# Patient Record
Sex: MALE | Race: BLACK OR AFRICAN AMERICAN | NOT HISPANIC OR LATINO | URBAN - METROPOLITAN AREA
[De-identification: names, ages, dates, MRNs, and addresses within clinical notes are randomized per-mention and may not be internally consistent; named-entity substitution may affect disease eponyms.]

---

## 2017-04-25 ENCOUNTER — EMERGENCY (EMERGENCY)
Facility: HOSPITAL | Age: 27
LOS: 0 days | Discharge: HOME | End: 2017-04-25

## 2017-06-28 DIAGNOSIS — R56.9 UNSPECIFIED CONVULSIONS: ICD-10-CM
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
## 2017-06-28 DIAGNOSIS — M25.511 PAIN IN RIGHT SHOULDER: ICD-10-CM

## 2017-06-28 DIAGNOSIS — S01.552A OPEN BITE OF ORAL CAVITY, INITIAL ENCOUNTER: ICD-10-CM

## 2017-06-28 DIAGNOSIS — Z79.899 OTHER LONG TERM (CURRENT) DRUG THERAPY: ICD-10-CM

## 2017-06-28 DIAGNOSIS — G40.909 EPILEPSY, UNSPECIFIED, NOT INTRACTABLE, WITHOUT STATUS EPILEPTICUS: ICD-10-CM
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
## 2017-08-09 ENCOUNTER — EMERGENCY (EMERGENCY)
Facility: HOSPITAL | Age: 27
LOS: 0 days | Discharge: HOME | End: 2017-08-09

## 2017-08-09 DIAGNOSIS — R56.9 UNSPECIFIED CONVULSIONS: ICD-10-CM

## 2017-08-09 DIAGNOSIS — G40.909 EPILEPSY, UNSPECIFIED, NOT INTRACTABLE, WITHOUT STATUS EPILEPTICUS: ICD-10-CM

## 2017-08-09 DIAGNOSIS — Z79.899 OTHER LONG TERM (CURRENT) DRUG THERAPY: ICD-10-CM
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
## 2017-08-16 ENCOUNTER — INPATIENT (INPATIENT)
Facility: HOSPITAL | Age: 27
LOS: 0 days | Discharge: LEFT BEFORE DISCHARGE | End: 2017-08-16
Attending: SURGERY
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
## 2017-08-16 DIAGNOSIS — R56.9 UNSPECIFIED CONVULSIONS: ICD-10-CM

## 2017-08-18 DIAGNOSIS — F12.10 CANNABIS ABUSE, UNCOMPLICATED: ICD-10-CM

## 2017-08-18 DIAGNOSIS — G83.84 TODD'S PARALYSIS (POSTEPILEPTIC): ICD-10-CM
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
## 2017-08-18 DIAGNOSIS — G40.419 OTHER GENERALIZED EPILEPSY AND EPILEPTIC SYNDROMES, INTRACTABLE, WITHOUT STATUS EPILEPTICUS: ICD-10-CM

## 2017-08-18 DIAGNOSIS — R56.9 UNSPECIFIED CONVULSIONS: ICD-10-CM

## 2017-08-18 DIAGNOSIS — Z82.49 FAMILY HISTORY OF ISCHEMIC HEART DISEASE AND OTHER DISEASES OF THE CIRCULATORY SYSTEM: ICD-10-CM

## 2017-08-18 DIAGNOSIS — Z83.3 FAMILY HISTORY OF DIABETES MELLITUS: ICD-10-CM
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
## 2017-08-18 DIAGNOSIS — S12.400A UNSPECIFIED DISPLACED FRACTURE OF FIFTH CERVICAL VERTEBRA, INITIAL ENCOUNTER FOR CLOSED FRACTURE: ICD-10-CM
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
## 2017-09-15 PROBLEM — Z00.00 ENCOUNTER FOR PREVENTIVE HEALTH EXAMINATION: Status: ACTIVE | Noted: 2017-09-15

## 2018-05-12 ENCOUNTER — INPATIENT (INPATIENT)
Facility: HOSPITAL | Age: 28
LOS: 0 days | Discharge: LEFT BEFORE DISCHARGE | End: 2018-05-13
Attending: INTERNAL MEDICINE | Admitting: INTERNAL MEDICINE

## 2018-05-12 ENCOUNTER — EMERGENCY (EMERGENCY)
Facility: HOSPITAL | Age: 28
LOS: 0 days | Discharge: LEFT BEFORE DISCHARGE | End: 2018-05-12
Attending: EMERGENCY MEDICINE

## 2018-05-12 VITALS
HEART RATE: 72 BPM | HEIGHT: 70 IN | SYSTOLIC BLOOD PRESSURE: 132 MMHG | WEIGHT: 188.05 LBS | TEMPERATURE: 98 F | RESPIRATION RATE: 17 BRPM | DIASTOLIC BLOOD PRESSURE: 81 MMHG | OXYGEN SATURATION: 98 %

## 2018-05-12 VITALS
SYSTOLIC BLOOD PRESSURE: 115 MMHG | TEMPERATURE: 97 F | RESPIRATION RATE: 18 BRPM | WEIGHT: 199.96 LBS | DIASTOLIC BLOOD PRESSURE: 56 MMHG | HEART RATE: 81 BPM | OXYGEN SATURATION: 99 % | HEIGHT: 69 IN

## 2018-05-12 VITALS
RESPIRATION RATE: 18 BRPM | SYSTOLIC BLOOD PRESSURE: 112 MMHG | HEART RATE: 79 BPM | DIASTOLIC BLOOD PRESSURE: 60 MMHG | OXYGEN SATURATION: 99 %

## 2018-05-12 DIAGNOSIS — R56.9 UNSPECIFIED CONVULSIONS: ICD-10-CM

## 2018-05-12 LAB
ALBUMIN SERPL ELPH-MCNC: 3.8 G/DL — SIGNIFICANT CHANGE UP (ref 3.5–5.2)
ALP SERPL-CCNC: 79 U/L — SIGNIFICANT CHANGE UP (ref 30–115)
ALT FLD-CCNC: 15 U/L — SIGNIFICANT CHANGE UP (ref 0–41)
ANION GAP SERPL CALC-SCNC: 11 MMOL/L — SIGNIFICANT CHANGE UP (ref 7–14)
AST SERPL-CCNC: 9 U/L — SIGNIFICANT CHANGE UP (ref 0–41)
BILIRUB SERPL-MCNC: 0.4 MG/DL — SIGNIFICANT CHANGE UP (ref 0.2–1.2)
BUN SERPL-MCNC: 13 MG/DL — SIGNIFICANT CHANGE UP (ref 10–20)
CALCIUM SERPL-MCNC: 8.6 MG/DL — SIGNIFICANT CHANGE UP (ref 8.5–10.1)
CHLORIDE SERPL-SCNC: 99 MMOL/L — SIGNIFICANT CHANGE UP (ref 98–110)
CO2 SERPL-SCNC: 25 MMOL/L — SIGNIFICANT CHANGE UP (ref 17–32)
CREAT SERPL-MCNC: 0.8 MG/DL — SIGNIFICANT CHANGE UP (ref 0.7–1.5)
GAS PNL BLDV: SIGNIFICANT CHANGE UP
GLUCOSE SERPL-MCNC: 90 MG/DL — SIGNIFICANT CHANGE UP (ref 70–99)
HCT VFR BLD CALC: 37.4 % — LOW (ref 42–52)
HGB BLD-MCNC: 12.6 G/DL — LOW (ref 14–18)
MCHC RBC-ENTMCNC: 29.1 PG — SIGNIFICANT CHANGE UP (ref 27–31)
MCHC RBC-ENTMCNC: 33.7 G/DL — SIGNIFICANT CHANGE UP (ref 32–37)
MCV RBC AUTO: 86.4 FL — SIGNIFICANT CHANGE UP (ref 80–94)
NRBC # BLD: 0 /100 WBCS — SIGNIFICANT CHANGE UP (ref 0–0)
PLATELET # BLD AUTO: 182 K/UL — SIGNIFICANT CHANGE UP (ref 130–400)
POTASSIUM SERPL-MCNC: 4.2 MMOL/L — SIGNIFICANT CHANGE UP (ref 3.5–5)
POTASSIUM SERPL-SCNC: 4.2 MMOL/L — SIGNIFICANT CHANGE UP (ref 3.5–5)
PROT SERPL-MCNC: 6.2 G/DL — SIGNIFICANT CHANGE UP (ref 6–8)
RBC # BLD: 4.33 M/UL — LOW (ref 4.7–6.1)
RBC # FLD: 14.2 % — SIGNIFICANT CHANGE UP (ref 11.5–14.5)
SODIUM SERPL-SCNC: 135 MMOL/L — SIGNIFICANT CHANGE UP (ref 135–146)
WBC # BLD: 9.63 K/UL — SIGNIFICANT CHANGE UP (ref 4.8–10.8)
WBC # FLD AUTO: 9.63 K/UL — SIGNIFICANT CHANGE UP (ref 4.8–10.8)

## 2018-05-12 RX ORDER — LEVETIRACETAM 250 MG/1
500 TABLET, FILM COATED ORAL
Qty: 0 | Refills: 0 | Status: DISCONTINUED | OUTPATIENT
Start: 2018-05-12 | End: 2018-05-12

## 2018-05-12 RX ORDER — HEPARIN SODIUM 5000 [USP'U]/ML
5000 INJECTION INTRAVENOUS; SUBCUTANEOUS EVERY 12 HOURS
Qty: 0 | Refills: 0 | Status: DISCONTINUED | OUTPATIENT
Start: 2018-05-12 | End: 2018-05-12

## 2018-05-12 RX ORDER — HEPARIN SODIUM 5000 [USP'U]/ML
5000 INJECTION INTRAVENOUS; SUBCUTANEOUS EVERY 12 HOURS
Qty: 0 | Refills: 0 | Status: DISCONTINUED | OUTPATIENT
Start: 2018-05-12 | End: 2018-05-13

## 2018-05-12 RX ORDER — LEVETIRACETAM 250 MG/1
2000 TABLET, FILM COATED ORAL ONCE
Qty: 0 | Refills: 0 | Status: COMPLETED | OUTPATIENT
Start: 2018-05-12 | End: 2018-05-12

## 2018-05-12 RX ORDER — LEVETIRACETAM 250 MG/1
500 TABLET, FILM COATED ORAL ONCE
Qty: 0 | Refills: 0 | Status: COMPLETED | OUTPATIENT
Start: 2018-05-12 | End: 2018-05-12

## 2018-05-12 RX ORDER — PANTOPRAZOLE SODIUM 20 MG/1
40 TABLET, DELAYED RELEASE ORAL
Qty: 0 | Refills: 0 | Status: DISCONTINUED | OUTPATIENT
Start: 2018-05-12 | End: 2018-05-13

## 2018-05-12 RX ORDER — LEVETIRACETAM 250 MG/1
1500 TABLET, FILM COATED ORAL
Qty: 0 | Refills: 0 | Status: DISCONTINUED | OUTPATIENT
Start: 2018-05-13 | End: 2018-05-13

## 2018-05-12 RX ADMIN — Medication 2 MILLIGRAM(S): at 05:22

## 2018-05-12 RX ADMIN — LEVETIRACETAM 400 MILLIGRAM(S): 250 TABLET, FILM COATED ORAL at 06:04

## 2018-05-12 RX ADMIN — LEVETIRACETAM 420 MILLIGRAM(S): 250 TABLET, FILM COATED ORAL at 22:11

## 2018-05-12 RX ADMIN — Medication 100 MILLIGRAM(S): at 06:57

## 2018-05-12 RX ADMIN — LEVETIRACETAM 500 MILLIGRAM(S): 250 TABLET, FILM COATED ORAL at 17:41

## 2018-05-12 NOTE — H&P ADULT - NSHPLABSRESULTS_GEN_ALL_CORE
12.6   9.63  )-----------( 182      ( 12 May 2018 04:29 )             37.4     05-12    135  |  99  |  13  ----------------------------<  90  4.2   |  25  |  0.8    Ca    8.6      12 May 2018 04:29    TPro  6.2  /  Alb  3.8  /  TBili  0.4  /  DBili  x   /  AST  9   /  ALT  15  /  AlkPhos  79  05-12              Lactate Trend        CAPILLARY BLOOD GLUCOSE

## 2018-05-12 NOTE — CONSULT NOTE ADULT - SUBJECTIVE AND OBJECTIVE BOX
Neurology Consult    REYES CHARLINE 27y Male    Patient is a 27y old  Male who presents with a chief complaint of pt had   un witnessed sseizures at home as p[er er.  pt c/o only general weakness (12 May 2018 06:36)  He reports starting to have seizures from 2014. the frequency of the events are about 1/month. He says occasionally has a warning in the form of an intese feeling like anxiety before the seizure. No Tod's after the seizure.  He reports being compliant. Not aware of blood levels done ouside.  He denies any risk factors for seizure denies taking recreational drugs      HPI:  no h/o  fever, head trauma prior to aarrival (12 May 2018 06:36)      PAST MEDICAL & SURGICAL HISTORY:  Seizures  No significant past surgical history      FAMILY HISTORY:  No pertinent family history in first degree relatives      Social History: (-) x 3    Allergies    No Known Allergies    Intolerances        Home Medications:  Keppra 500 mg oral tablet:  orally once a day (12 May 2018 01:06)      MEDICATIONS  (STANDING):  heparin  Injectable 5000 Unit(s) SubCutaneous every 12 hours  levETIRAcetam 500 milliGRAM(s) Oral two times a day  pantoprazole    Tablet 40 milliGRAM(s) Oral before breakfast    MEDICATIONS  (PRN):  LORazepam   Injectable 2 milliGRAM(s) IV Push every 4 hours PRN seizures      Review of systems:    Constitutional: No fever, weight loss or fatigue    Eyes: No eye pain or discharge  ENMT:  No difficulty hearing; No sinus or throat pain  Neck: No pain or stiffness  Respiratory: No cough, wheezing, chills or hemoptysis  Cardiovascular: No chest pain, palpitations, shortness of breath, dyspnea on exertion  Gastrointestinal: No abdominal pain, nausea, vomiting or hematemesis; No diarrhea or constipation.   Genitourinary: No dysuria, frequency, hematuria or incontinence  Neurological: As per HPI  Skin: No rashes or lesions   Endocrine: No heat or cold intolerance; No hair loss  Musculoskeletal: No joint pain or swelling  Psychiatric: No depression, anxiety, mood swings  Heme/Lymph: No easy bruising or bleeding gums    Vital Signs Last 24 Hrs  T(C): 37.1 (12 May 2018 07:48), Max: 37.1 (12 May 2018 07:48)  T(F): 98.7 (12 May 2018 07:48), Max: 98.7 (12 May 2018 07:48)  HR: 114 (12 May 2018 04:45) (72 - 114)  BP: 115/58 (12 May 2018 07:48) (107/51 - 132/81)  BP(mean): --  RR: 88 (12 May 2018 07:48) (17 - 88)  SpO2: 98% (12 May 2018 03:24) (98% - 99%)    Neurologic Examination:  General:  Appearance is consistent with chronologic age.  No abnormal facies.   General: The patient is oriented to person, place, time and date.  Recent and remote memory intact.  Fund of knowledge is intact and normal.  Language with normal repetition, comprehension and naming.  Nondysarthric.    Cranial nerves: intact VA, VFF.  EOMI w/o nystagmus, skew or reported double vision.  PERRL.  No ptosis/weakness of eyelid closure.  Facial sensation is normal with normal bite.  No facial asymmetry.  Hearing grossly intact b/l.  Palate elevates midline.  Tongue midline.  Motor examination:   Normal tone, bulk and range of motion.  No tenderness, twitching, tremors or involuntary movements.  Formal Muscle Strength Testing: (MRC grade R/L) 5/5 UE; 5/5 LE.  No observable drift.  Reflexes:   2+ b/l pectoralis, biceps, triceps, brachioradialis, patella and Achilles.  Plantar response downgoing b/l.  Jaw jerk, Basim, clonus absent.  Sensory examination:   Intact to light touch and pinprick, pain, temperature and proprioception and vibration in all extremities.  Cerebellum:   FTN/HKS intact with normal ELDON in all limbs.  No dysmetria or dysdiadokinesia.  Gait narrow based and normal.    Labs:                         12.6   9.63  )-----------( 182      ( 12 May 2018 04:29 )             37.4     05-12    135  |  99  |  13  ----------------------------<  90  4.2   |  25  |  0.8    Ca    8.6      12 May 2018 04:29    TPro  6.2  /  Alb  3.8  /  TBili  0.4  /  DBili  x   /  AST  9   /  ALT  15  /  AlkPhos  79  05-12    LIVER FUNCTIONS - ( 12 May 2018 04:29 )  Alb: 3.8 g/dL / Pro: 6.2 g/dL / ALK PHOS: 79 U/L / ALT: 15 U/L / AST: 9 U/L / GGT: x                   Neuroimaging:  NCHCT: CT Head No Cont:   EXAM:  CT BRAIN            PROCEDURE DATE:  05/12/2018            INTERPRETATION:  Clinical History / Reason for exam: Status epilepticus.    Technique:  Multiple contiguous axial CT images of the brain were   obtained without the administration ofintravenous contrast.  Coronal and   sagittal reformatted images were also obtained.     Comparison:   CT head 8/16/2017     Findings:  The cortical sulci and ventricular system are normal in size and   appearance.      Evaluation of the brain parenchyma demonstrates normal gray-white   differentiation throughout.  There is no evidence for mass effect,   midline shift or intracranial hemorrhage.      There is no evidence of depressed skull fracture.      Mucosal thickening is present in the left maxillary sinus. The mastoid   air cells are well-aerated.       Impression:        No acute intracranial hemorrhage, mass effect or midline shift.                    DARA PINEDA M.D., RESIDENT RADIOLOGIST  This document has been electronically signed.  TIARA RIZZO M.D., ATTENDING RADIOLOGIST  This document has been electronically signed. May 12 2018  6:22AM             (05-12-18 @ 05:55)    CT Angiography/Perfusion:  MRI Brain NC:  MRA Head/Neck:  EEG:    Assessment:  This is a 27y Male with h/o     Plan:   -   05-12-18 @ 11:19

## 2018-05-12 NOTE — ED PROVIDER NOTE - NS ED ROS FT
Constitutional: No fevers/chills.    Head: No injury, headache.    Eyes:  No visual changes, eye pain or discharge. No injury.    ENMT:  No hearing changes, pain, discharge or infections. No neck pain or stiffness. No loss ROM.    Cardiac:  No chest pain, SOB or edema. No chest pain with exertion.    Respiratory:  No cough or respiratory distress.     GI:  No nausea, vomiting, diarrhea or abdominal pain. No anorexia. No change in PO intake.    :  No dysuria, frequency, urgency or burning. No change in urine output.    MS:  No myalgia, muscle weakness, joint pain or back pain. No loss ROM.    Neuro:  (+) fatigue. No dizziness or weakness.  No LOC.    Skin:  No skin rash.     Endocrine: No history of thyroid disease or diabetes.

## 2018-05-12 NOTE — H&P ADULT - NSHPREVIEWOFSYSTEMS_GEN_ALL_CORE
REVIEW OF SYSTEMS:      CONSTITUTIONAL:      h/o seizures ---  on keppra  EYES/ENT:       NECK:       RESPIRATORY:       CARDIOVASCULAR:       GASTROINTESTINAL:       GENITOURINARY:        NEUROLOGICAL:        h/o seizures ++  SKIN:

## 2018-05-12 NOTE — ED PROVIDER NOTE - PROGRESS NOTE DETAILS
Patient had grand mal seizure in ED. Ativan 2mg given will load with keppra. spoke with Dr. Smith Patient approved for epilepsy unit dr. ni aware of admission

## 2018-05-12 NOTE — ED ADULT TRIAGE NOTE - CHIEF COMPLAINT QUOTE
Pt BIBA. As per EMT "Pt's mother will not allow him back into the house until he has blood work done for his seizures"   Pt has no complaint at this time.

## 2018-05-12 NOTE — ED PROVIDER NOTE - ATTENDING CONTRIBUTION TO CARE
26 y/o M PMH Epilepsy on Keppra who presents s/p 2 seizures today.  Patient was BIBEMS for a seizure and eloped form the ED.  EMS later brought him back again for a seizure. Patient denies any falls or recent trauma. No fever or recent illness.  Patient had another seizure here in the ED and meets criteria for status epilepticus.  Patient given Ativan.  Will load with Keppra.  Will get appropriate work up done and consult Neuro for admission for Status Epilepticus.

## 2018-05-12 NOTE — H&P ADULT - REASON FOR ADMISSION
pt had witnessed seizures pt had   un witnessed sseizures at home as p[er er.  pt c/o only general weakness

## 2018-05-12 NOTE — ED PROVIDER NOTE - OBJECTIVE STATEMENT
26 yo male with PMHx seizures on Keppra presents for seizure at home. Patient states he had an unwitnessed seizure at home and he knows he had a seizure because he has a headache after seizures. Patient was in ED earlier but eloped before he got bloodwork. Patient went home and mother told  him he cant come back in the house until he goes to hospital for further work up. Patient here for evaluation. Patient only complaint is fatigue. Patient/family denies fevers, chest pain, SOB, abdominal pain, nausea, vomiting, diarrhea, constipation, dizziness, recent travel, changes in PO intake, changes in urine output, changes in mental status.

## 2018-05-12 NOTE — CHART NOTE - NSCHARTNOTEFT_GEN_A_CORE
nocturnist event note    Called for patient with seizure like spell., described as shaking of both upper ext. patient stated h had an aura before hand. entire episdoe lasted about 1 minute and there was no postictal state.       Came to the floor right away, patient is awake and alert and has a large dinne rtray in front of him that he is eating. tells me he has been having seizures since 2014     VSS, awake and alert, eating  AAOx3, S1 S2 mildy tachy 10-4 regular rythm. CTA BL lungs  abd soft  cn 2-12 intact  motor 5/5 all ext  sensory symmetric      Chart review reveals a 26 yo male with seizure d/o, brought in for eval of such, and s.p grand mal seizure in the ER last night. He was loaded with 2g keppra in the ER and is currently maintained on keppra 500mg po q12. OF note is he tells me he takes 1500mg po 12, though seemed unsure about his dose    impression:  possible seizure based on reprot  no postictal state  self resolved, no need for ativan  call placed out to neuro to discuss keppra dosing  will f/u neuro recs and endorse to dayteam for followup as well

## 2018-05-12 NOTE — ED PROVIDER NOTE - PHYSICAL EXAMINATION
GEN: Well appearing, in no apparent distress.    HEAD:  Normocephalic, atraumatic.    EYES:  Clear conjunctivae without injection, drainage or discharge.    ENMT:  Nasal mucosa moist; mouth moist without ulcerations or lesions; throat moist without erythema, exudate, ulcerations or lesions.    NECK:  Supple, no masses. Normal ROM.    CARDIAC:  RRR, normal S1 and S2, no murmurs, rubs or gallops.    RESP:  Respiratory rate and effort appear normal; lungs are clear to auscultation bilaterally; no rhonchi, rales or wheezes.    ABDOMEN:  Soft, non-tender, non-distended, no masses. Normal BS throughout.    MUSCULOSKELETAL/NEURO:  AAO x 3. Motor 5/5. Sensory intact. CN II – XII intact. Patient able to move all 4 extremities without difficulty.    SKIN:  Normal skin color for age and race, well-perfused; warm and dry.

## 2018-05-12 NOTE — H&P ADULT - ASSESSMENT
Patient is a 27y old  Male who presents with a chief complaint of pt had witnessed seizures (12 May 2018 06:36)                                                                                                                                                                                                                                                                                       HEALTH ISSUES - PROBLEM Dx:pt with h\o seizures prese  nts with c/0 unitnessed seizures

## 2018-05-12 NOTE — CONSULT NOTE ADULT - ATTENDING COMMENTS
28 Y/O right handed   is here for having two seizures ,He carries the Dx of epilepsy perhaps partial/the description.  needs better characterization of the syndrome  discussed the V-EEG   He is not agreeable to that. However will think about it.    If decided not to stay suggest to give extra 250 of Keppra  before DC  F/U with his neurolgist

## 2018-05-13 VITALS
TEMPERATURE: 97 F | HEART RATE: 86 BPM | RESPIRATION RATE: 16 BRPM | SYSTOLIC BLOOD PRESSURE: 132 MMHG | DIASTOLIC BLOOD PRESSURE: 63 MMHG

## 2018-05-13 DIAGNOSIS — G47.30 SLEEP APNEA, UNSPECIFIED: ICD-10-CM

## 2018-05-13 LAB
ALBUMIN SERPL ELPH-MCNC: 3.8 G/DL — SIGNIFICANT CHANGE UP (ref 3.5–5.2)
ALP SERPL-CCNC: 86 U/L — SIGNIFICANT CHANGE UP (ref 30–115)
ALT FLD-CCNC: 21 U/L — SIGNIFICANT CHANGE UP (ref 0–41)
ANION GAP SERPL CALC-SCNC: 12 MMOL/L — SIGNIFICANT CHANGE UP (ref 7–14)
AST SERPL-CCNC: 25 U/L — SIGNIFICANT CHANGE UP (ref 0–41)
BASOPHILS # BLD AUTO: 0.05 K/UL — SIGNIFICANT CHANGE UP (ref 0–0.2)
BASOPHILS NFR BLD AUTO: 0.6 % — SIGNIFICANT CHANGE UP (ref 0–1)
BILIRUB DIRECT SERPL-MCNC: <0.2 MG/DL — SIGNIFICANT CHANGE UP (ref 0–0.2)
BILIRUB INDIRECT FLD-MCNC: >0.7 MG/DL — SIGNIFICANT CHANGE UP (ref 0.2–1.2)
BILIRUB SERPL-MCNC: 0.9 MG/DL — SIGNIFICANT CHANGE UP (ref 0.2–1.2)
BUN SERPL-MCNC: 12 MG/DL — SIGNIFICANT CHANGE UP (ref 10–20)
CALCIUM SERPL-MCNC: 8.2 MG/DL — LOW (ref 8.5–10.1)
CHLORIDE SERPL-SCNC: 103 MMOL/L — SIGNIFICANT CHANGE UP (ref 98–110)
CO2 SERPL-SCNC: 26 MMOL/L — SIGNIFICANT CHANGE UP (ref 17–32)
CREAT SERPL-MCNC: 0.9 MG/DL — SIGNIFICANT CHANGE UP (ref 0.7–1.5)
EOSINOPHIL # BLD AUTO: 0.14 K/UL — SIGNIFICANT CHANGE UP (ref 0–0.7)
EOSINOPHIL NFR BLD AUTO: 1.8 % — SIGNIFICANT CHANGE UP (ref 0–8)
GLUCOSE SERPL-MCNC: 91 MG/DL — SIGNIFICANT CHANGE UP (ref 70–99)
HCT VFR BLD CALC: 39.2 % — LOW (ref 42–52)
HGB BLD-MCNC: 12.9 G/DL — LOW (ref 14–18)
IMM GRANULOCYTES NFR BLD AUTO: 0.1 % — SIGNIFICANT CHANGE UP (ref 0.1–0.3)
LYMPHOCYTES # BLD AUTO: 1.9 K/UL — SIGNIFICANT CHANGE UP (ref 1.2–3.4)
LYMPHOCYTES # BLD AUTO: 24 % — SIGNIFICANT CHANGE UP (ref 20.5–51.1)
MAGNESIUM SERPL-MCNC: 2.2 MG/DL — SIGNIFICANT CHANGE UP (ref 1.8–2.4)
MCHC RBC-ENTMCNC: 28.5 PG — SIGNIFICANT CHANGE UP (ref 27–31)
MCHC RBC-ENTMCNC: 32.9 G/DL — SIGNIFICANT CHANGE UP (ref 32–37)
MCV RBC AUTO: 86.7 FL — SIGNIFICANT CHANGE UP (ref 80–94)
MONOCYTES # BLD AUTO: 1.18 K/UL — HIGH (ref 0.1–0.6)
MONOCYTES NFR BLD AUTO: 14.9 % — HIGH (ref 1.7–9.3)
NEUTROPHILS # BLD AUTO: 4.63 K/UL — SIGNIFICANT CHANGE UP (ref 1.4–6.5)
NEUTROPHILS NFR BLD AUTO: 58.6 % — SIGNIFICANT CHANGE UP (ref 42.2–75.2)
NRBC # BLD: 0 /100 WBCS — SIGNIFICANT CHANGE UP (ref 0–0)
PHOSPHATE SERPL-MCNC: 3.3 MG/DL — SIGNIFICANT CHANGE UP (ref 2.1–4.9)
PLATELET # BLD AUTO: 180 K/UL — SIGNIFICANT CHANGE UP (ref 130–400)
POTASSIUM SERPL-MCNC: 4.3 MMOL/L — SIGNIFICANT CHANGE UP (ref 3.5–5)
POTASSIUM SERPL-SCNC: 4.3 MMOL/L — SIGNIFICANT CHANGE UP (ref 3.5–5)
PROT SERPL-MCNC: 6 G/DL — SIGNIFICANT CHANGE UP (ref 6–8)
RBC # BLD: 4.52 M/UL — LOW (ref 4.7–6.1)
RBC # FLD: 13.9 % — SIGNIFICANT CHANGE UP (ref 11.5–14.5)
SODIUM SERPL-SCNC: 141 MMOL/L — SIGNIFICANT CHANGE UP (ref 135–146)
WBC # BLD: 7.91 K/UL — SIGNIFICANT CHANGE UP (ref 4.8–10.8)
WBC # FLD AUTO: 7.91 K/UL — SIGNIFICANT CHANGE UP (ref 4.8–10.8)

## 2018-05-13 RX ADMIN — LEVETIRACETAM 1500 MILLIGRAM(S): 250 TABLET, FILM COATED ORAL at 05:58

## 2018-05-13 RX ADMIN — PANTOPRAZOLE SODIUM 40 MILLIGRAM(S): 20 TABLET, DELAYED RELEASE ORAL at 05:59

## 2018-05-13 NOTE — PROGRESS NOTE ADULT - SUBJECTIVE AND OBJECTIVE BOX
CHARLINE CHAMBERS  27y  Male    Patient is a 27y old  Male who presents with a chief complaint of unwitnessed seizures at home and generalised weakness (12 May 2018 06:36)      INTERVAL HPI/OVERNIGHT EVENTS:  Several minutes of generalized tonic clonic seizures which went unrecognized by PCAs. Thus no IV ativan given. Keppra dose was however increased overnight. Given several seizures episodes    REVIEW OF SYSTEMS:  CONSTITUTIONAL: No fever, weight loss, or fatigue  EYES: No eye pain, visual disturbances, or discharge  ENMT:  No difficulty hearing, tinnitus, vertigo; No sinus or throat pain  NECK: No pain or stiffness  BREASTS: No pain, masses, or nipple discharge  RESPIRATORY: No cough, wheezing, chills or hemoptysis; No shortness of breath  CARDIOVASCULAR: No chest pain, palpitations, dizziness, or leg swelling  GASTROINTESTINAL: No abdominal or epigastric pain. No nausea, vomiting, or hematemesis; No diarrhea or constipation. No melena or hematochezia.  GENITOURINARY: No dysuria, frequency, hematuria, or incontinence  NEUROLOGICAL: No headaches, memory loss, loss of strength, numbness, or tremors  SKIN: No itching, burning, rashes, or lesions   LYMPH NODES: No enlarged glands  ENDOCRINE: No heat or cold intolerance; No hair loss  MUSCULOSKELETAL: No joint pain or swelling; No muscle, back, or extremity pain  PSYCHIATRIC: No depression, anxiety, mood swings, or difficulty sleeping  HEME/LYMPH: No easy bruising, or bleeding gums  ALLERY AND IMMUNOLOGIC: No hives or eczema    VITALS:  T(F): 97.1 (05-13-18 @ 05:30), Max: 99.2 (05-12-18 @ 14:14)  HR: 78 (05-13-18 @ 05:30) (73 - 109)  BP: 108/54 (05-13-18 @ 05:30) (107/57 - 138/72)  RR: 16 (05-13-18 @ 05:30) (16 - 16)  SpO2: 95% (05-12-18 @ 21:12) (95% - 95%)    PHYSICAL EXAM:  GENERAL: NAD, well-groomed, well-developed  HEAD:  Atraumatic, Normocephalic  EYES: EOMI, PERRLA, conjunctiva and sclera clear  ENMT: No tonsillar erythema, exudates, or enlargement; Moist mucous membranes, Good dentition, No lesions  NECK: Supple, No JVD, Normal thyroid  NERVOUS SYSTEM:  Alert & Oriented X3, Good concentration; Motor Strength 5/5 B/L upper and lower extremities; DTRs 2+ intact and symmetric  CHEST/LUNG: Clear to percussion bilaterally; No rales, rhonchi, wheezing, or rubs  HEART: Regular rate and rhythm; No murmurs, rubs, or gallops  ABDOMEN: Soft, Nontender, Nondistended; Bowel sounds present  EXTREMITIES:  2+ Peripheral Pulses, No clubbing, cyanosis, or edema  LYMPH: No lymphadenopathy noted  SKIN: No rashes or lesions    Consultant(s) Notes Reviewed:  [x ] YES  [ ] NO  Care Discussed with Consultants/Other Providers [ x] YES  [ ] NO    LABS:  MICROBIOLOGY:      RADIOLOGY & ADDITIONAL TESTS:    Imaging Personally Reviewed:  [ ] YES  [ ] NO  MEDICATION:  heparin  Injectable 5000 Unit(s) SubCutaneous every 12 hours  levETIRAcetam 1500 milliGRAM(s) Oral two times a day  LORazepam   Injectable 2 milliGRAM(s) IV Push every 4 hours PRN  pantoprazole    Tablet 40 milliGRAM(s) Oral before breakfast      HEALTH ISSUES:  HEALTH ISSUES - PROBLEM Dx:  Seizures: Seizures CHARLINE CHAMBERS  27y  Male    Patient is a 27y old  Male who presents with a chief complaint of unwitnessed seizures at home and generalised weakness (12 May 2018 06:36)      INTERVAL HPI/OVERNIGHT EVENTS:  Several minutes of generalized tonic clonic seizures which went unrecognized by PCAs. Thus no IV ativan given. Keppra dose was however increased overnight. Given several seizures episodes.    REVIEW OF SYSTEMS:  CONSTITUTIONAL: No fever, weight loss, or fatigue  EYES: No eye pain, visual disturbances, or discharge  ENMT:  No difficulty hearing, tinnitus, vertigo; No sinus or throat pain  NECK: No pain or stiffness  BREASTS: No pain, masses, or nipple discharge  RESPIRATORY: No cough, wheezing, chills or hemoptysis; No shortness of breath  CARDIOVASCULAR: No chest pain, palpitations, dizziness, or leg swelling  GASTROINTESTINAL: No abdominal or epigastric pain. No nausea, vomiting, or hematemesis; No diarrhea or constipation. No melena or hematochezia.  GENITOURINARY: No dysuria, frequency, hematuria, or incontinence  NEUROLOGICAL: No headaches, memory loss, loss of strength, numbness, or tremors  SKIN: No itching, burning, rashes, or lesions   LYMPH NODES: No enlarged glands  ENDOCRINE: No heat or cold intolerance; No hair loss  MUSCULOSKELETAL: No joint pain or swelling; No muscle, back, or extremity pain  PSYCHIATRIC: No depression, anxiety, mood swings, or difficulty sleeping  HEME/LYMPH: No easy bruising, or bleeding gums  ALLERY AND IMMUNOLOGIC: No hives or eczema    VITALS:  T(F): 97.1 (05-13-18 @ 05:30), Max: 99.2 (05-12-18 @ 14:14)  HR: 78 (05-13-18 @ 05:30) (73 - 109)  BP: 108/54 (05-13-18 @ 05:30) (107/57 - 138/72)  RR: 16 (05-13-18 @ 05:30) (16 - 16)  SpO2: 95% (05-12-18 @ 21:12) (95% - 95%)    PHYSICAL EXAM:  GENERAL: NAD, well-groomed, well-developed, lethargic  HEAD:  Atraumatic, Normocephalic  EYES: EOMI, PERRLA, conjunctiva and sclera clear  ENMT: Moist mucous membranes  NECK: Supple, Normal thyroid  NERVOUS SYSTEM:  Alert & Oriented X3, Motor Strength 5/5 B/L upper and lower extremities, Flat affect  CHEST/LUNG: Clear to percussion bilaterally; No rales, rhonchi, wheezing, or rubs  HEART: Regular rate and rhythm; No murmurs, rubs, or gallops  ABDOMEN: Soft, Nontender, Nondistended; Bowel sounds present  EXTREMITIES:  2+ Peripheral Pulses, No clubbing, cyanosis, or edema  LYMPH: No lymphadenopathy noted  SKIN: No rashes or lesions    Consultant(s) Notes Reviewed:  [x ] YES  [ ] NO  Care Discussed with Consultants/Other Providers [ x] YES  [ ] NO    LABS:                        12.9   7.91  )-----------( 180      ( 13 May 2018 08:00 )             39.2     05-13    141  |  103  |  12  ----------------------------<  91  4.3   |  26  |  0.9    Ca    8.2<L>      13 May 2018 08:00  Phos  3.3     05-13  Mg     2.2     05-13    TPro  6.0  /  Alb  3.8  /  TBili  0.9  /  DBili  <0.2  /  AST  25  /  ALT  21  /  AlkPhos  86  05-13    MICROBIOLOGY: None      RADIOLOGY & ADDITIONAL TESTS:  CT Head No Cont (05.12.18 @ 05:55)     No acute intracranial hemorrhage, mass effect or midline shift.    Imaging Personally Reviewed:  [x] YES  [ ] NO    VEEG in the last 24 hours:    Background--------8-9 hz    Focal and generalized slowing----mild right hemispheric focal slowing    Interictal activity------large number of right hemispheric (temporal )spikes    Events----see below    Seizures----two partial seizures from the right mid-posterior temporal regions clinically characterized by oral and hand automatism and behavioral arrest at the onset      MEDICATIONS  (STANDING):  heparin  Injectable 5000 Unit(s) SubCutaneous every 12 hours  levETIRAcetam 1500 milliGRAM(s) Oral two times a day  pantoprazole    Tablet 40 milliGRAM(s) Oral before breakfast    MEDICATIONS  (PRN):  LORazepam   Injectable 2 milliGRAM(s) IV Push every 4 hours PRN seizures      HEALTH ISSUES - PROBLEM Dx:  Seizures: Seizures

## 2018-05-13 NOTE — PROGRESS NOTE ADULT - PROBLEM SELECTOR PLAN 1
Neurology following: Impression: right hemispheric partial epilepsy.   Plan discussed with patient extensively  including the findings and videos.  He was encouraged to stay.   MRI brain recommended by neurology.   Continue Keppra at 1500 mg twice a day.   Prn Ativan for seizures. Seizure precautions.   Get Keppra Trough level.

## 2018-05-13 NOTE — PROGRESS NOTE ADULT - SUBJECTIVE AND OBJECTIVE BOX
Epilepsy Attending Note:     REYES CHARLINE    27y Male  MRN [id number]    Vital Signs Last 24 Hrs  T(C): 36.2 (13 May 2018 05:30), Max: 37.3 (12 May 2018 14:14)  T(F): 97.1 (13 May 2018 05:30), Max: 99.2 (12 May 2018 14:14)  HR: 78 (13 May 2018 05:30) (73 - 109)  BP: 108/54 (13 May 2018 05:30) (107/57 - 138/72)  BP(mean): --  RR: 16 (13 May 2018 05:30) (16 - 16)  SpO2: 95% (12 May 2018 21:12) (95% - 95%)                          12.9   7.91  )-----------( 180      ( 13 May 2018 08:00 )             39.2       05-13    141  |  103  |  12  ----------------------------<  91  4.3   |  26  |  0.9    Ca    8.2<L>      13 May 2018 08:00  Phos  3.3     05-13  Mg     2.2     05-13    TPro  6.0  /  Alb  3.8  /  TBili  0.9  /  DBili  <0.2  /  AST  25  /  ALT  21  /  AlkPhos  86  05-13      MEDICATIONS  (STANDING):  heparin  Injectable 5000 Unit(s) SubCutaneous every 12 hours  levETIRAcetam 1500 milliGRAM(s) Oral two times a day  pantoprazole    Tablet 40 milliGRAM(s) Oral before breakfast    MEDICATIONS  (PRN):  LORazepam   Injectable 2 milliGRAM(s) IV Push every 4 hours PRN seizures            VEEG in the last 24 hours:    Background--------8-9 hz    Focal and generalized slowing----mild right hemispheric focal slowing    Interictal activity------large number of right hemispheric (temporal )spikes    Events----see below    Seizures----two partial seizures from the right mid-posterior temporal regions clinically characterized by oral and hand automatism and behavioral arrest at the onset    Impression:    right hemispheric partial epilepsy.     Plan - Discussed with him extensively the findings, showed him the video.          encouraged him to stay . he needs MRI, agree with the current dose of Keppra. he needs to have  a trough level. The history is also suggestive of co-existing sleep apnea

## 2018-05-13 NOTE — PROGRESS NOTE ADULT - SUBJECTIVE AND OBJECTIVE BOX
Epilepsy Attending Note:     CHARLINE CHAMBERS    27y Male  MRN [id number]    Vital Signs Last 24 Hrs  T(C): 36.2 (13 May 2018 05:30), Max: 37.3 (12 May 2018 14:14)  T(F): 97.1 (13 May 2018 05:30), Max: 99.2 (12 May 2018 14:14)  HR: 78 (13 May 2018 05:30) (73 - 109)  BP: 108/54 (13 May 2018 05:30) (107/57 - 138/72)  BP(mean): --  RR: 16 (13 May 2018 05:30) (16 - 16)  SpO2: 95% (12 May 2018 21:12) (95% - 95%)                          12.9   7.91  )-----------( 180      ( 13 May 2018 08:00 )             39.2       05-13    141  |  103  |  12  ----------------------------<  91  4.3   |  26  |  0.9    Ca    8.2<L>      13 May 2018 08:00  Phos  3.3     05-13  Mg     2.2     05-13    TPro  6.0  /  Alb  3.8  /  TBili  0.9  /  DBili  <0.2  /  AST  25  /  ALT  21  /  AlkPhos  86  05-13      MEDICATIONS  (STANDING):  heparin  Injectable 5000 Unit(s) SubCutaneous every 12 hours  levETIRAcetam 1500 milliGRAM(s) Oral two times a day  pantoprazole    Tablet 40 milliGRAM(s) Oral before breakfast    MEDICATIONS  (PRN):  LORazepam   Injectable 2 milliGRAM(s) IV Push every 4 hours PRN seizures            ADDENDUM:    the results were also discussed with Mom   I had also made the awre that he should not be driving until he is cleared bt neurology

## 2018-05-14 DIAGNOSIS — Z02.9 ENCOUNTER FOR ADMINISTRATIVE EXAMINATIONS, UNSPECIFIED: ICD-10-CM

## 2018-05-22 DIAGNOSIS — G40.409 OTHER GENERALIZED EPILEPSY AND EPILEPTIC SYNDROMES, NOT INTRACTABLE, WITHOUT STATUS EPILEPTICUS: ICD-10-CM

## 2018-05-22 DIAGNOSIS — G47.30 SLEEP APNEA, UNSPECIFIED: ICD-10-CM

## 2018-06-17 ENCOUNTER — EMERGENCY (EMERGENCY)
Facility: HOSPITAL | Age: 28
LOS: 0 days | Discharge: HOME | End: 2018-06-17
Attending: EMERGENCY MEDICINE | Admitting: EMERGENCY MEDICINE

## 2018-06-17 VITALS
DIASTOLIC BLOOD PRESSURE: 60 MMHG | HEIGHT: 67 IN | RESPIRATION RATE: 19 BRPM | WEIGHT: 190.04 LBS | OXYGEN SATURATION: 97 % | HEART RATE: 95 BPM | SYSTOLIC BLOOD PRESSURE: 133 MMHG | TEMPERATURE: 98 F

## 2018-06-17 DIAGNOSIS — N44.00 TORSION OF TESTIS, UNSPECIFIED: Chronic | ICD-10-CM

## 2018-06-17 DIAGNOSIS — Z79.899 OTHER LONG TERM (CURRENT) DRUG THERAPY: ICD-10-CM

## 2018-06-17 DIAGNOSIS — R56.9 UNSPECIFIED CONVULSIONS: ICD-10-CM

## 2018-06-17 LAB
ALBUMIN SERPL ELPH-MCNC: 4.3 G/DL — SIGNIFICANT CHANGE UP (ref 3.5–5.2)
ALP SERPL-CCNC: 88 U/L — SIGNIFICANT CHANGE UP (ref 30–115)
ALT FLD-CCNC: 14 U/L — SIGNIFICANT CHANGE UP (ref 0–41)
ANION GAP SERPL CALC-SCNC: 22 MMOL/L — HIGH (ref 7–14)
AST SERPL-CCNC: 11 U/L — SIGNIFICANT CHANGE UP (ref 0–41)
BASOPHILS # BLD AUTO: 0.06 K/UL — SIGNIFICANT CHANGE UP (ref 0–0.2)
BASOPHILS NFR BLD AUTO: 1 % — SIGNIFICANT CHANGE UP (ref 0–1)
BILIRUB DIRECT SERPL-MCNC: <0.2 MG/DL — SIGNIFICANT CHANGE UP (ref 0–0.2)
BILIRUB INDIRECT FLD-MCNC: >0.2 MG/DL — SIGNIFICANT CHANGE UP (ref 0.2–1.2)
BILIRUB SERPL-MCNC: 0.4 MG/DL — SIGNIFICANT CHANGE UP (ref 0.2–1.2)
BUN SERPL-MCNC: 16 MG/DL — SIGNIFICANT CHANGE UP (ref 10–20)
CALCIUM SERPL-MCNC: 9.4 MG/DL — SIGNIFICANT CHANGE UP (ref 8.5–10.1)
CHLORIDE SERPL-SCNC: 97 MMOL/L — LOW (ref 98–110)
CO2 SERPL-SCNC: 19 MMOL/L — SIGNIFICANT CHANGE UP (ref 17–32)
CREAT SERPL-MCNC: 1.1 MG/DL — SIGNIFICANT CHANGE UP (ref 0.7–1.5)
EOSINOPHIL # BLD AUTO: 0.31 K/UL — SIGNIFICANT CHANGE UP (ref 0–0.7)
EOSINOPHIL NFR BLD AUTO: 5.3 % — SIGNIFICANT CHANGE UP (ref 0–8)
GLUCOSE SERPL-MCNC: 69 MG/DL — LOW (ref 70–99)
HCT VFR BLD CALC: 44.6 % — SIGNIFICANT CHANGE UP (ref 42–52)
HGB BLD-MCNC: 14.8 G/DL — SIGNIFICANT CHANGE UP (ref 14–18)
IMM GRANULOCYTES NFR BLD AUTO: 0.5 % — HIGH (ref 0.1–0.3)
LYMPHOCYTES # BLD AUTO: 1.42 K/UL — SIGNIFICANT CHANGE UP (ref 1.2–3.4)
LYMPHOCYTES # BLD AUTO: 24.4 % — SIGNIFICANT CHANGE UP (ref 20.5–51.1)
MCHC RBC-ENTMCNC: 29.4 PG — SIGNIFICANT CHANGE UP (ref 27–31)
MCHC RBC-ENTMCNC: 33.2 G/DL — SIGNIFICANT CHANGE UP (ref 32–37)
MCV RBC AUTO: 88.5 FL — SIGNIFICANT CHANGE UP (ref 80–94)
MONOCYTES # BLD AUTO: 0.82 K/UL — HIGH (ref 0.1–0.6)
MONOCYTES NFR BLD AUTO: 14.1 % — HIGH (ref 1.7–9.3)
NEUTROPHILS # BLD AUTO: 3.18 K/UL — SIGNIFICANT CHANGE UP (ref 1.4–6.5)
NEUTROPHILS NFR BLD AUTO: 54.7 % — SIGNIFICANT CHANGE UP (ref 42.2–75.2)
NRBC # BLD: 0 /100 WBCS — SIGNIFICANT CHANGE UP (ref 0–0)
PLATELET # BLD AUTO: 194 K/UL — SIGNIFICANT CHANGE UP (ref 130–400)
POTASSIUM SERPL-MCNC: 4.5 MMOL/L — SIGNIFICANT CHANGE UP (ref 3.5–5)
POTASSIUM SERPL-SCNC: 4.5 MMOL/L — SIGNIFICANT CHANGE UP (ref 3.5–5)
PROT SERPL-MCNC: 7.3 G/DL — SIGNIFICANT CHANGE UP (ref 6–8)
RBC # BLD: 5.04 M/UL — SIGNIFICANT CHANGE UP (ref 4.7–6.1)
RBC # FLD: 13.9 % — SIGNIFICANT CHANGE UP (ref 11.5–14.5)
SODIUM SERPL-SCNC: 138 MMOL/L — SIGNIFICANT CHANGE UP (ref 135–146)
WBC # BLD: 5.82 K/UL — SIGNIFICANT CHANGE UP (ref 4.8–10.8)
WBC # FLD AUTO: 5.82 K/UL — SIGNIFICANT CHANGE UP (ref 4.8–10.8)

## 2018-06-17 RX ORDER — SODIUM CHLORIDE 9 MG/ML
3 INJECTION INTRAMUSCULAR; INTRAVENOUS; SUBCUTANEOUS ONCE
Qty: 0 | Refills: 0 | Status: COMPLETED | OUTPATIENT
Start: 2018-06-17 | End: 2018-06-17

## 2018-06-17 RX ORDER — SODIUM CHLORIDE 9 MG/ML
1000 INJECTION INTRAMUSCULAR; INTRAVENOUS; SUBCUTANEOUS
Qty: 0 | Refills: 0 | Status: DISCONTINUED | OUTPATIENT
Start: 2018-06-17 | End: 2018-06-17

## 2018-06-17 RX ADMIN — SODIUM CHLORIDE 3 MILLILITER(S): 9 INJECTION INTRAMUSCULAR; INTRAVENOUS; SUBCUTANEOUS at 13:15

## 2018-06-17 NOTE — ED PROVIDER NOTE - ATTENDING CONTRIBUTION TO CARE
Pt is a 28yo male who comes in for seizure today witnessed by mother.  No fall or head trauma.  Typical 4min tonic-clonic activity with post-ictal state.  Now asymptomatic.  Reports compliance with Keppra 1500mg BID.  No other complaints.    Exam: normal neuro exam, cap refill <2s, soft nontender abdomen  Imp:S eizure  Plan: labs, neuro f/u

## 2018-06-17 NOTE — ED PROVIDER NOTE - OBJECTIVE STATEMENT
27 year old male past medical history of seizures on Keppra and marijuana prescribed in [place]. Pt states that he is compliant with his meds his mother gives to him. patient states that today he generalized tonic clonic seizure. no head injury mother caught patient. patient states has no injury. denies headache, weakness, numbness. no urinary/fecal incontinence.

## 2018-06-17 NOTE — ED ADULT TRIAGE NOTE - CHIEF COMPLAINT QUOTE
As per patient "I had a seizure, around 1050". Witnessed seizure by mother, denies trauma, states "He was shaking and I held him, it lasted about three, four minutes".

## 2018-06-17 NOTE — ED PROVIDER NOTE - PHYSICAL EXAMINATION
Physical Exam    Vital Signs: I have reviewed the initial vital signs.  Constitutional: well-nourished, appears stated age, no acute distress  Eyes: Conjunctiva pink, Sclera clear, PERRLA, EOMI.  Mouth - tongue abrasion noted to right side  Cardiovascular: S1 and S2, regular rate, regular rhythm, well-perfused extremities, radial pulses equal and 2+  Respiratory: unlabored respiratory effort, clear to auscultation bilaterally no wheezing, rales and rhonchi  Gastrointestinal: soft, non-tender abdomen, no pulsatile mass, normal bowl sounds  Musculoskeletal: supple neck, no lower extremity edema, no midline tenderness  Integumentary: warm, dry, no rash  Neurologic: awake, alert, cranial nerves II-XII grossly intact, extremities’ motor and sensory functions grossly intact  Psychiatric: appropriate mood, appropriate affect

## 2018-09-23 ENCOUNTER — EMERGENCY (EMERGENCY)
Facility: HOSPITAL | Age: 28
LOS: 0 days | Discharge: HOME | End: 2018-09-23
Attending: EMERGENCY MEDICINE | Admitting: EMERGENCY MEDICINE

## 2018-09-23 VITALS
SYSTOLIC BLOOD PRESSURE: 118 MMHG | TEMPERATURE: 97 F | HEART RATE: 60 BPM | OXYGEN SATURATION: 99 % | DIASTOLIC BLOOD PRESSURE: 58 MMHG | RESPIRATION RATE: 18 BRPM

## 2018-09-23 VITALS
TEMPERATURE: 98 F | DIASTOLIC BLOOD PRESSURE: 58 MMHG | HEART RATE: 65 BPM | RESPIRATION RATE: 18 BRPM | SYSTOLIC BLOOD PRESSURE: 105 MMHG | OXYGEN SATURATION: 99 %

## 2018-09-23 DIAGNOSIS — Z79.899 OTHER LONG TERM (CURRENT) DRUG THERAPY: ICD-10-CM

## 2018-09-23 DIAGNOSIS — F17.200 NICOTINE DEPENDENCE, UNSPECIFIED, UNCOMPLICATED: ICD-10-CM

## 2018-09-23 DIAGNOSIS — M54.2 CERVICALGIA: ICD-10-CM

## 2018-09-23 DIAGNOSIS — M62.838 OTHER MUSCLE SPASM: ICD-10-CM

## 2018-09-23 DIAGNOSIS — N44.00 TORSION OF TESTIS, UNSPECIFIED: Chronic | ICD-10-CM

## 2018-09-23 LAB
ANION GAP SERPL CALC-SCNC: 13 MMOL/L — SIGNIFICANT CHANGE UP (ref 7–14)
BUN SERPL-MCNC: 11 MG/DL — SIGNIFICANT CHANGE UP (ref 10–20)
CALCIUM SERPL-MCNC: 9.5 MG/DL — SIGNIFICANT CHANGE UP (ref 8.5–10.1)
CHLORIDE SERPL-SCNC: 102 MMOL/L — SIGNIFICANT CHANGE UP (ref 98–110)
CO2 SERPL-SCNC: 28 MMOL/L — SIGNIFICANT CHANGE UP (ref 17–32)
CREAT SERPL-MCNC: 1.1 MG/DL — SIGNIFICANT CHANGE UP (ref 0.7–1.5)
GLUCOSE SERPL-MCNC: 83 MG/DL — SIGNIFICANT CHANGE UP (ref 70–99)
POTASSIUM SERPL-MCNC: 4.4 MMOL/L — SIGNIFICANT CHANGE UP (ref 3.5–5)
POTASSIUM SERPL-SCNC: 4.4 MMOL/L — SIGNIFICANT CHANGE UP (ref 3.5–5)
SODIUM SERPL-SCNC: 143 MMOL/L — SIGNIFICANT CHANGE UP (ref 135–146)

## 2018-09-23 RX ORDER — KETOROLAC TROMETHAMINE 30 MG/ML
15 SYRINGE (ML) INJECTION ONCE
Qty: 0 | Refills: 0 | Status: DISCONTINUED | OUTPATIENT
Start: 2018-09-23 | End: 2018-09-23

## 2018-09-23 RX ORDER — KETOROLAC TROMETHAMINE 30 MG/ML
1 SYRINGE (ML) INJECTION
Qty: 8 | Refills: 0 | OUTPATIENT
Start: 2018-09-23 | End: 2018-09-24

## 2018-09-23 RX ORDER — METHOCARBAMOL 500 MG/1
750 TABLET, FILM COATED ORAL ONCE
Qty: 0 | Refills: 0 | Status: COMPLETED | OUTPATIENT
Start: 2018-09-23 | End: 2018-09-23

## 2018-09-23 RX ORDER — SODIUM CHLORIDE 9 MG/ML
1000 INJECTION, SOLUTION INTRAVENOUS ONCE
Qty: 0 | Refills: 0 | Status: COMPLETED | OUTPATIENT
Start: 2018-09-23 | End: 2018-09-23

## 2018-09-23 RX ADMIN — METHOCARBAMOL 750 MILLIGRAM(S): 500 TABLET, FILM COATED ORAL at 20:44

## 2018-09-23 RX ADMIN — SODIUM CHLORIDE 1000 MILLILITER(S): 9 INJECTION, SOLUTION INTRAVENOUS at 20:44

## 2018-09-23 RX ADMIN — Medication 15 MILLIGRAM(S): at 20:30

## 2018-09-23 NOTE — ED PROVIDER NOTE - NS ED ROS FT
Constitutional: see HPI  Eyes:  No visual changes, eye pain or discharge.  ENMT:  No hearing changes, pain, discharge or infections. No neck pain or stiffness.  Cardiac:  No chest pain, SOB or edema. No chest pain with exertion.  Respiratory:  No cough or respiratory distress. No hemoptysis. No history of asthma or RAD.  GI:  No nausea, vomiting, diarrhea or abdominal pain.  :  No dysuria, frequency or burning.  MS:  see HPI  Neuro:  No headache or weakness.  No LOC.  Skin:  No skin rash.   Endocrine: No history of thyroid disease or diabetes.

## 2018-09-23 NOTE — ED PROVIDER NOTE - MEDICAL DECISION MAKING DETAILS
Pt reassessed. Labs reviewed with pt.  Advised close follow up with PMD  in 1-2 days for reevaluation and pt agrees.  Return precautions advised and pt verbalized understanding.

## 2018-09-23 NOTE — ED PROVIDER NOTE - PROGRESS NOTE DETAILS
Pt reports feeling much better at this time. Advised close follow up outpt and agrees. Toradol prescribed PRN.  Return precautions advised.

## 2018-09-23 NOTE — ED PROVIDER NOTE - PHYSICAL EXAMINATION
VITAL SIGNS: noted  CONSTITUTIONAL: Well-developed; well-nourished; in no acute distress  HEAD: Normocephalic; atraumatic  EYES: PERRL, EOM intact; conjunctiva and sclera clear  ENT: No nasal discharge; airway clear. MMM  NECK: Supple; + tenderness over right SCM, no erythema or swelling, worse with turning head, no midline tenderness. No cervical lymphadenopathy noted  CARD: S1, S2 normal; no murmurs, gallops, or rubs. Regular rate and rhythm  RESP: CTAB/L, no wheezes, rales or rhonchi  ABD: Normal bowel sounds; soft; non-distended; non-tender; no hepatosplenomegaly. No CVA tenderness  EXT: Normal ROM. No calf tenderness or edema. Distal pulses intact  NEURO: Alert, oriented. Grossly unremarkable. normal speech, no facial asymmetry, negative pronator drift, no ataxia, no dysdiadokinesia, no nystagmus, sensory equal and intact bilateral, MS 5/5 UE and LE.   MS: No midline spinal tenderness

## 2018-09-23 NOTE — ED ADULT NURSE NOTE - OBJECTIVE STATEMENT
pt is a 26 yo male presents s/p Sz on saturday evening, states was with mom but does not know duration of Sz or if trauma occurred. states right side of neck hurts, spine non tender, no deformities noted, denies fever, ha/n/v/d/diz/blurred viz /cp.

## 2018-09-23 NOTE — ED ADULT NURSE NOTE - NSIMPLEMENTINTERV_GEN_ALL_ED
Implemented All Universal Safety Interventions:  [place] to call system. Call bell, personal items and telephone within reach. Instruct patient to call for assistance. Room bathroom lighting operational. Non-slip footwear when patient is off stretcher. Physically safe environment: no spills, clutter or unnecessary equipment. Stretcher in lowest position, wheels locked, appropriate side rails in place.

## 2018-09-23 NOTE — ED PROVIDER NOTE - OBJECTIVE STATEMENT
28 yo male with PMH seizure d/o on Keppra presents c/o right sided neck pain that started yesterday after seizure. Pt states he rolled to wooden floor. Denies any HA, dizziness, visual complaints, N/V.  Denies any paresthesias or weakness.  States pain constant, worse with palpation and turning head. Did not take any OTC meds. States he is taking his Keppra as usual; no recent dose changes.  Sleeping well; caffeine usually trigger for him.

## 2018-09-24 LAB — HIV 1 & 2 AB SERPL IA.RAPID: SIGNIFICANT CHANGE UP

## 2019-03-21 ENCOUNTER — INPATIENT (INPATIENT)
Facility: HOSPITAL | Age: 29
LOS: 1 days | Discharge: HOME | End: 2019-03-23
Attending: INTERNAL MEDICINE | Admitting: INTERNAL MEDICINE

## 2019-03-21 VITALS — HEIGHT: 68 IN | WEIGHT: 205.03 LBS

## 2019-03-21 DIAGNOSIS — J18.9 PNEUMONIA, UNSPECIFIED ORGANISM: ICD-10-CM

## 2019-03-21 DIAGNOSIS — N44.00 TORSION OF TESTIS, UNSPECIFIED: Chronic | ICD-10-CM

## 2019-03-21 DIAGNOSIS — R56.9 UNSPECIFIED CONVULSIONS: ICD-10-CM

## 2019-03-21 LAB
ALBUMIN SERPL ELPH-MCNC: 4.6 G/DL — SIGNIFICANT CHANGE UP (ref 3.5–5.2)
ALP SERPL-CCNC: 94 U/L — SIGNIFICANT CHANGE UP (ref 30–115)
ALT FLD-CCNC: 13 U/L — SIGNIFICANT CHANGE UP (ref 0–41)
ANION GAP SERPL CALC-SCNC: 13 MMOL/L — SIGNIFICANT CHANGE UP (ref 7–14)
APPEARANCE UR: CLEAR — SIGNIFICANT CHANGE UP
AST SERPL-CCNC: 14 U/L — SIGNIFICANT CHANGE UP (ref 0–41)
BASOPHILS # BLD AUTO: 0.05 K/UL — SIGNIFICANT CHANGE UP (ref 0–0.2)
BASOPHILS NFR BLD AUTO: 0.3 % — SIGNIFICANT CHANGE UP (ref 0–1)
BILIRUB SERPL-MCNC: 0.9 MG/DL — SIGNIFICANT CHANGE UP (ref 0.2–1.2)
BILIRUB UR-MCNC: NEGATIVE — SIGNIFICANT CHANGE UP
BUN SERPL-MCNC: 14 MG/DL — SIGNIFICANT CHANGE UP (ref 10–20)
CALCIUM SERPL-MCNC: 9.2 MG/DL — SIGNIFICANT CHANGE UP (ref 8.5–10.1)
CHLORIDE SERPL-SCNC: 100 MMOL/L — SIGNIFICANT CHANGE UP (ref 98–110)
CO2 SERPL-SCNC: 26 MMOL/L — SIGNIFICANT CHANGE UP (ref 17–32)
COLOR SPEC: YELLOW — SIGNIFICANT CHANGE UP
CREAT SERPL-MCNC: 1.1 MG/DL — SIGNIFICANT CHANGE UP (ref 0.7–1.5)
DIFF PNL FLD: NEGATIVE — SIGNIFICANT CHANGE UP
EOSINOPHIL # BLD AUTO: 0 K/UL — SIGNIFICANT CHANGE UP (ref 0–0.7)
EOSINOPHIL NFR BLD AUTO: 0 % — SIGNIFICANT CHANGE UP (ref 0–8)
GLUCOSE BLDC GLUCOMTR-MCNC: 108 MG/DL — HIGH (ref 70–99)
GLUCOSE SERPL-MCNC: 75 MG/DL — SIGNIFICANT CHANGE UP (ref 70–99)
GLUCOSE UR QL: 500 MG/DL
HCT VFR BLD CALC: 43.3 % — SIGNIFICANT CHANGE UP (ref 42–52)
HGB BLD-MCNC: 14.4 G/DL — SIGNIFICANT CHANGE UP (ref 14–18)
IMM GRANULOCYTES NFR BLD AUTO: 0.4 % — HIGH (ref 0.1–0.3)
KETONES UR-MCNC: NEGATIVE — SIGNIFICANT CHANGE UP
LEUKOCYTE ESTERASE UR-ACNC: NEGATIVE — SIGNIFICANT CHANGE UP
LYMPHOCYTES # BLD AUTO: 0.46 K/UL — LOW (ref 1.2–3.4)
LYMPHOCYTES # BLD AUTO: 2.5 % — LOW (ref 20.5–51.1)
MAGNESIUM SERPL-MCNC: 2 MG/DL — SIGNIFICANT CHANGE UP (ref 1.8–2.4)
MCHC RBC-ENTMCNC: 29.1 PG — SIGNIFICANT CHANGE UP (ref 27–31)
MCHC RBC-ENTMCNC: 33.3 G/DL — SIGNIFICANT CHANGE UP (ref 32–37)
MCV RBC AUTO: 87.7 FL — SIGNIFICANT CHANGE UP (ref 80–94)
MONOCYTES # BLD AUTO: 1.22 K/UL — HIGH (ref 0.1–0.6)
MONOCYTES NFR BLD AUTO: 6.6 % — SIGNIFICANT CHANGE UP (ref 1.7–9.3)
NEUTROPHILS # BLD AUTO: 16.69 K/UL — HIGH (ref 1.4–6.5)
NEUTROPHILS NFR BLD AUTO: 90.2 % — HIGH (ref 42.2–75.2)
NITRITE UR-MCNC: NEGATIVE — SIGNIFICANT CHANGE UP
NRBC # BLD: 0 /100 WBCS — SIGNIFICANT CHANGE UP (ref 0–0)
PH UR: 6 — SIGNIFICANT CHANGE UP (ref 5–8)
PHOSPHATE SERPL-MCNC: 2.2 MG/DL — SIGNIFICANT CHANGE UP (ref 2.1–4.9)
PLATELET # BLD AUTO: 196 K/UL — SIGNIFICANT CHANGE UP (ref 130–400)
POTASSIUM SERPL-MCNC: 4.1 MMOL/L — SIGNIFICANT CHANGE UP (ref 3.5–5)
POTASSIUM SERPL-SCNC: 4.1 MMOL/L — SIGNIFICANT CHANGE UP (ref 3.5–5)
PROT SERPL-MCNC: 7.7 G/DL — SIGNIFICANT CHANGE UP (ref 6–8)
PROT UR-MCNC: NEGATIVE MG/DL — SIGNIFICANT CHANGE UP
RBC # BLD: 4.94 M/UL — SIGNIFICANT CHANGE UP (ref 4.7–6.1)
RBC # FLD: 13.7 % — SIGNIFICANT CHANGE UP (ref 11.5–14.5)
SODIUM SERPL-SCNC: 139 MMOL/L — SIGNIFICANT CHANGE UP (ref 135–146)
SP GR SPEC: 1.01 — SIGNIFICANT CHANGE UP (ref 1.01–1.03)
UROBILINOGEN FLD QL: 0.2 MG/DL — SIGNIFICANT CHANGE UP (ref 0.2–0.2)
WBC # BLD: 18.5 K/UL — HIGH (ref 4.8–10.8)
WBC # FLD AUTO: 18.5 K/UL — HIGH (ref 4.8–10.8)

## 2019-03-21 RX ORDER — LEVETIRACETAM 250 MG/1
500 TABLET, FILM COATED ORAL ONCE
Qty: 0 | Refills: 0 | Status: COMPLETED | OUTPATIENT
Start: 2019-03-21 | End: 2019-03-21

## 2019-03-21 RX ORDER — LEVETIRACETAM 250 MG/1
1000 TABLET, FILM COATED ORAL
Qty: 0 | Refills: 0 | Status: DISCONTINUED | OUTPATIENT
Start: 2019-03-21 | End: 2019-03-21

## 2019-03-21 RX ORDER — SODIUM CHLORIDE 9 MG/ML
1000 INJECTION, SOLUTION INTRAVENOUS ONCE
Qty: 0 | Refills: 0 | Status: COMPLETED | OUTPATIENT
Start: 2019-03-21 | End: 2019-03-21

## 2019-03-21 RX ORDER — LEVETIRACETAM 250 MG/1
1000 TABLET, FILM COATED ORAL
Qty: 0 | Refills: 0 | Status: DISCONTINUED | OUTPATIENT
Start: 2019-03-21 | End: 2019-03-22

## 2019-03-21 RX ADMIN — LEVETIRACETAM 500 MILLIGRAM(S): 250 TABLET, FILM COATED ORAL at 19:14

## 2019-03-21 RX ADMIN — SODIUM CHLORIDE 1000 MILLILITER(S): 9 INJECTION, SOLUTION INTRAVENOUS at 18:55

## 2019-03-21 RX ADMIN — Medication 2 MILLIGRAM(S): at 14:30

## 2019-03-21 RX ADMIN — SODIUM CHLORIDE 1000 MILLILITER(S): 9 INJECTION, SOLUTION INTRAVENOUS at 14:43

## 2019-03-21 RX ADMIN — SODIUM CHLORIDE 1000 MILLILITER(S): 9 INJECTION, SOLUTION INTRAVENOUS at 19:14

## 2019-03-21 RX ADMIN — LEVETIRACETAM 400 MILLIGRAM(S): 250 TABLET, FILM COATED ORAL at 18:13

## 2019-03-21 NOTE — ED ADULT TRIAGE NOTE - PAIN RATING/NUMBER SCALE (0-10): ACTIVITY
Rajesh is calling to report that he had his liver, pancreas and gallbladder ultrasound this morning.  This was done at [place], [place].    Dr. Tracy wanted to be informed of this.  
Report received and pt called with results.  Please see other telephone encounter.   
Requested fax results   
0

## 2019-03-21 NOTE — H&P ADULT - NSHPLABSRESULTS_GEN_ALL_CORE
14.4   18.50 )-----------( 196      ( 21 Mar 2019 11:20 )             43.3     03-21    139  |  100  |  14  ----------------------------<  75  4.1   |  26  |  1.1    Ca    9.2      21 Mar 2019 11:38  Phos  2.2     -  Mg     2.0         TPro  7.7  /  Alb  4.6  /  TBili  0.9  /  DBili  x   /  AST  14  /  ALT  13  /  AlkPhos  94  -          Urinalysis Basic - ( 21 Mar 2019 15:50 )    Color: Yellow / Appearance: Clear / S.010 / pH: x  Gluc: x / Ketone: Negative  / Bili: Negative / Urobili: 0.2 mg/dL   Blood: x / Protein: Negative mg/dL / Nitrite: Negative   Leuk Esterase: Negative / RBC: x / WBC x   Sq Epi: x / Non Sq Epi: x / Bacteria: x        Lactate Trend        CAPILLARY BLOOD GLUCOSE  102 (21 Mar 2019 18:22)      POCT Blood Glucose.: 102 mg/dL (21 Mar 2019 18:21)

## 2019-03-21 NOTE — H&P ADULT - HISTORY OF PRESENT ILLNESS
· Chief Complaint: The patient is a 28y Male complaining of seizures.	  · HPI Objective Statement: 28y M with PMH of seizures on keppra presents with 3 seizures, Pt had his dose increased of keppra from 1500 to 4000, the first seizure 3 am lasting 3 minutes long with a post ictal period of 15 minutes. Pt was post ictal for longer this time which is why they brought her in and he never had more than 2 seizures before. He denies head trauma or falling on the floor the seizures are similar to his last seizures. he is oriented to person and place.

## 2019-03-21 NOTE — ED ADULT TRIAGE NOTE - CHIEF COMPLAINT QUOTE
BIBA via [facility] from home, ems states patient had 3 seizures witnessed by family, each last 1 minutes, found post ectal by EMS.

## 2019-03-21 NOTE — H&P ADULT - NEUROLOGICAL DETAILS
alert and oriented x 3/deep reflexes intact/responds to verbal commands/no spontaneous movement/superficial reflexes intact/normal strength/sensation intact/cranial nerves intact

## 2019-03-21 NOTE — ED PROVIDER NOTE - NS ED ROS FT
General: no fever, no chills  Eyes:  No visual changes, eye pain or discharge.  ENMT:  No hearing changes, pain, no sore throat or runny nose, no difficulty swallowing  Cardiac:  No chest pain, SOB or edema. No chest pain with exertion.  Respiratory:  No cough or respiratory distress. No hemoptysis. No history of asthma or RAD.  GI:  No nausea, vomiting, diarrhea or abdominal pain.  :  No dysuria, frequency or burning.  MS:  No myalgia, muscle weakness, joint pain or back pain.  Neuro:  +Seizures, No headache or weakness.  No LOC.  Skin:  No skin rash.   Endocrine: No history of thyroid disease or diabetes.

## 2019-03-21 NOTE — ED PROVIDER NOTE - CLINICAL SUMMARY MEDICAL DECISION MAKING FREE TEXT BOX
Patient signed out to me by Dr. Yee. Patient here w several episodes of seizures and LLL PNA. Abx and anti-epileptics given. Patient agreed w admission. Patient admitted to the medicine service in stable condition. Neurology consulted

## 2019-03-21 NOTE — ED ADULT NURSE NOTE - NSIMPLEMENTINTERV_GEN_ALL_ED
Implemented All Fall with Harm Risk Interventions:  [place] to call system. Call bell, personal items and telephone within reach. Instruct patient to call for assistance. Room bathroom lighting operational. Non-slip footwear when patient is off stretcher. Physically safe environment: no spills, clutter or unnecessary equipment. Stretcher in lowest position, wheels locked, appropriate side rails in place. Provide visual cue, wrist band, yellow gown, etc. Monitor gait and stability. Monitor for mental status changes and reorient to person, place, and time. Review medications for side effects contributing to fall risk. Reinforce activity limits and safety measures with patient and family. Provide visual clues: red socks.

## 2019-03-21 NOTE — ED PROVIDER NOTE - PROGRESS NOTE DETAILS
Pt had another generalized seizure lasting 2 minutes, resolved spontaneously Pt had another episode of clonic tonicity of upper extremities and foaming at the mouth, lasting 2 minutes, resolved spontaneously Had another generalized tonic clonic seizure, pulled out line, given 2 mg of ativan. Another page for Neuro, will admit Had another generalized tonic clonic seizure, pulled out line, given 2 mg of ativan. Another page for Neuro, will admit, spoken to family Had another generalized tonic clonic seizure, pulled out line, given 2 mg of ativan. page for Neuro, will admit, spoken to family, family agrees with plan Case discussed with Neurology team, will continue to monitor, To be admitted to epilepsy unit discussed at length with patient, pt wished to go home, Pt admits to smoking marijuana for relief, will discuss with mother Pt spoken to again, Xray reviewed, still wishes to leave, will consult psychiatry to determine decision capacity, will start on Levoquin Pt spoken to again, Xray reviewed, still wishes to leave, will consult psychiatry to determine decision capacity, will start on Levaquin patient sleeping but arousable, aao x 3, CXR with opacity, concern for aspiration, patient repeatedly refusing admission but cannot provide clear reason for not wanting to stay, is however not attempting to leave at this time, will give abx, additional keppra, capacity eval endorsed to Dr colvin Pt feels better, AAOx3, after a lengthy conversation, Pt is willing to stay.

## 2019-03-21 NOTE — ED ADULT NURSE NOTE - CHIEF COMPLAINT QUOTE
BIBA via [facility] from home, ems states patient had 3 seizures witnessed by family, each last 1 minutes, found post ectal by EMS.

## 2019-03-21 NOTE — H&P ADULT - NSHPREVIEWOFSYSTEMS_GEN_ALL_CORE
· Review of Systems: General: no fever, no chills  	Eyes:  No visual changes, eye pain or discharge.  	ENMT:  No hearing changes, pain, no sore throat or runny nose, no difficulty swallowing  	Cardiac:  No chest pain, SOB or edema. No chest pain with exertion.  	Respiratory:  No cough or respiratory distress. No hemoptysis. No history of asthma or RAD.  	GI:  No nausea, vomiting, diarrhea or abdominal pain.  	:  No dysuria, frequency or burning.  	MS:  No myalgia, muscle weakness, joint pain or back pain.  	Neuro:  +Seizures, No headache or weakness.  No LOC.  	Skin:  No skin rash.   Endocrine: No history of thyroid disease or diabetes.

## 2019-03-21 NOTE — ED PROVIDER NOTE - PHYSICAL EXAMINATION
CONSTITUTIONAL: Well-developed; well-nourished; laying in bed, no distress  SKIN: warm, dry  HEAD: Normocephalic; atraumatic.  EYES: PERRL, EOMI, no conjunctival erythema, no nystagmus  NECK: Supple; non tender.  CARD: S1, S2 normal; no murmurs, gallops, or rubs. Regular rate and rhythm.   RESP: No wheezes, rales or rhonchi.  ABD: soft ntnd  EXT: Normal ROM.  No clubbing, cyanosis or edema.   LYMPH: No acute cervical adenopathy.  NEURO: Alert, oriented, grossly unremarkable, facial nerves grossly intact, no cerebellar signs.  PSYCH: Cooperative, appropriate.

## 2019-03-21 NOTE — H&P ADULT - ASSESSMENT
Patient is a 28y old  Male who presents with a chief complaint of    seizures                                                                                                                                                                                                                                                                                    HEALTH ISSUES - PROBLEM Dx:  seizure  disorder/ Pneumonia

## 2019-03-21 NOTE — ED PROVIDER NOTE - OBJECTIVE STATEMENT
28y M with PMH of seizures on keppra presents with 3 seizures, Pt had his dose increased of keppra from 1500 to 2500, the first seizure 3 am lasting 3 minutes long with a post ictal period of 15 minutes. Pt was post ictal for longer this time which is why they brought her in. He denies feeling different from his normal seizures. He denies head truama or falling on the floor 28y M with PMH of seizures on keppra presents with 3 seizures, Pt had his dose increased of keppra from 1500 to 2500, the first seizure 3 am lasting 3 minutes long with a post ictal period of 15 minutes. Pt was post ictal for longer this time which is why they brought her in and he never had more than 2 seizures before. He denies head trauma or falling on the floor the seizures are similar to his last seizures. he is oriented to person and place. 28y M with PMH of seizures on keppra presents with 3 seizures, Pt had his dose increased of keppra from 1500 to 4000, the first seizure 3 am lasting 3 minutes long with a post ictal period of 15 minutes. Pt was post ictal for longer this time which is why they brought her in and he never had more than 2 seizures before. He denies head trauma or falling on the floor the seizures are similar to his last seizures. he is oriented to person and place.

## 2019-03-21 NOTE — ED PROVIDER NOTE - ATTENDING CONTRIBUTION TO CARE
Pt with seizure d/o x 4 years on keppra 4000mg bid bib mom for 3 seizures today, pt reports compliance, denies etoh or benzo/drug use other than marijuana from usual supplier, no recent illness, no trauma, on arrival pt AAO x 3 and at baseline c/o mild h/a which usually follows his seizures, head nc/at, perrla, eomi, tongue abraded, neck supple no meningismus cor rrr lungs with scattered rhonchi on left otherwise clear abd snt neuro intact, will cehck labs, ekg, CTH given change in pattern

## 2019-03-22 LAB
GLUCOSE BLDC GLUCOMTR-MCNC: 104 MG/DL — HIGH (ref 70–99)
HCT VFR BLD CALC: 39 % — LOW (ref 42–52)
HGB BLD-MCNC: 12.9 G/DL — LOW (ref 14–18)
LEVETIRACETAM SERPL-MCNC: 51.5 MCG/ML — HIGH (ref 12–46)
MCHC RBC-ENTMCNC: 29 PG — SIGNIFICANT CHANGE UP (ref 27–31)
MCHC RBC-ENTMCNC: 33.1 G/DL — SIGNIFICANT CHANGE UP (ref 32–37)
MCV RBC AUTO: 87.6 FL — SIGNIFICANT CHANGE UP (ref 80–94)
NRBC # BLD: 0 /100 WBCS — SIGNIFICANT CHANGE UP (ref 0–0)
PLATELET # BLD AUTO: 172 K/UL — SIGNIFICANT CHANGE UP (ref 130–400)
RBC # BLD: 4.45 M/UL — LOW (ref 4.7–6.1)
RBC # FLD: 13.2 % — SIGNIFICANT CHANGE UP (ref 11.5–14.5)
WBC # BLD: 11.95 K/UL — HIGH (ref 4.8–10.8)
WBC # FLD AUTO: 11.95 K/UL — HIGH (ref 4.8–10.8)

## 2019-03-22 RX ORDER — LEVETIRACETAM 250 MG/1
2000 TABLET, FILM COATED ORAL
Qty: 0 | Refills: 0 | Status: DISCONTINUED | OUTPATIENT
Start: 2019-03-22 | End: 2019-03-23

## 2019-03-22 RX ORDER — HEPARIN SODIUM 5000 [USP'U]/ML
5000 INJECTION INTRAVENOUS; SUBCUTANEOUS EVERY 8 HOURS
Qty: 0 | Refills: 0 | Status: DISCONTINUED | OUTPATIENT
Start: 2019-03-22 | End: 2019-03-23

## 2019-03-22 RX ORDER — LEVETIRACETAM 250 MG/1
1000 TABLET, FILM COATED ORAL ONCE
Qty: 0 | Refills: 0 | Status: DISCONTINUED | OUTPATIENT
Start: 2019-03-22 | End: 2019-03-22

## 2019-03-22 RX ORDER — LEVETIRACETAM 250 MG/1
2000 TABLET, FILM COATED ORAL
Qty: 0 | Refills: 0 | Status: DISCONTINUED | OUTPATIENT
Start: 2019-03-22 | End: 2019-03-22

## 2019-03-22 RX ADMIN — LEVETIRACETAM 2000 MILLIGRAM(S): 250 TABLET, FILM COATED ORAL at 05:52

## 2019-03-22 RX ADMIN — LEVETIRACETAM 2000 MILLIGRAM(S): 250 TABLET, FILM COATED ORAL at 17:01

## 2019-03-22 NOTE — CONSULT NOTE PEDS - ASSESSMENT
Assessment: Pt is a 28y M with PMH of seizure disorder on Keppra presented to the ER yesterday for breakthrough seizure.      Plan:   - VEEG for further characterization and treatment plan  - Seizure precautions  - Continue Keppra 2000 mg po q12hrs  - Ativan 2mg IV PRN for generalized tonic-clonic seizure lasting longer than 2 minutes, or two consecutive seizures without return to baseline in-between (ordered)  - CBC, CMP, Mg, AED levels trough (ordered)  - Keep Mg above 2    Discussed with Dr. Duong  Plan discussed with patient and mother in details, all questions answered

## 2019-03-22 NOTE — CONSULT NOTE ADULT - SUBJECTIVE AND OBJECTIVE BOX
Reviewed and referred to chart notes, evaluated patient with Dr. Frey.    pt reports that he had seizures and is aware of his condition and treatment needed. he initially wanted to leave but changed his mind when explained about the need for treatment for his pneumonia and evaluation of multiple seizures through VEEG. He is consenting ot treatment.  denies any psych hx.    alert ox3 mood is stable, no evidence of psychosis no threa to self or others. i/j fair.

## 2019-03-22 NOTE — CONSULT NOTE ADULT - ASSESSMENT
seizure disorder  pt is capable of making informed decision regarding his treatment and disposition.

## 2019-03-22 NOTE — PROVIDER CONTACT NOTE (OTHER) - REASON
pt having  tonic clonic movements lasting less than two minutes alert and responsive during and after  no post ictal state noted

## 2019-03-22 NOTE — CONSULT NOTE PEDS - SUBJECTIVE AND OBJECTIVE BOX
Neurology/Epilepsy Consult:      Patient is a 28y old right-handed male who presents to the ER    HPI: History obtained from patient and EMR   Pt is a 28y M with PMH of seizure disorder on Keppra presented to the ER yesterday for breakthrough seizure. Pt had his first seizure at about 3 am which he does not recall as he was sleeping his mother is the one that found him having GTC. The seizure lasted for about 3 minuted and he was post ictal for about 15- 20 minutes.  In the ER pt had another episode of tonic clonic movement of extremities with foaming at the mouth which lasted about 2 min and resolved spontaneously About 2 hours later pt had his 3rd seizure in the ER and was given 2 mg of Ativan. Pt states he was diagnosed with seizure disorder in  and is being monitored by Dr Martell in the city. As per the pt takes Keppra 2000 mg po q12hrs. Which was increased few months ago due to breakthrough seizure in january. Pt is a poor historian. No Keppra levels available. States that sometimes he gets an aura and inhaled CBP vape that helps him with seizure control.       PAST MEDICAL & SURGICAL HISTORY:  Seizures  Left testicular torsion    Risk Factors:  Denies Head trauma  Denies febrile seizures or CNS infections  Born full term    FAMILY HISTORY:  No seizures      Social History:   denies smoking  drink socially  smokes weed      Allergy:  No Known Allergies        MEDICATIONS  (STANDING):  heparin  Injectable 5000 Unit(s) SubCutaneous every 8 hours  levETIRAcetam 2000 milliGRAM(s) Oral two times a day  levoFLOXacin IVPB        MEDICATIONS  (PRN):  LORazepam   Injectable 2 milliGRAM(s) IV Push three times a day PRN generalized tonic-clonic seizure lasting longer than 2 minutes        Vital Signs Last 24 Hrs  T(C): 36.9 (22 Mar 2019 14:30), Max: 37.1 (22 Mar 2019 02:14)  T(F): 98.5 (22 Mar 2019 14:30), Max: 98.8 (22 Mar 2019 02:14)  HR: 80 (22 Mar 2019 14:30) (80 - 107)  BP: 120/59 (22 Mar 2019 14:30) (110/56 - 133/69)  BP(mean): --  RR: 16 (22 Mar 2019 04:57) (16 - 18)  SpO2: 96% (22 Mar 2019 08:09) (94% - 96%)    Neurologic Examination:  General:  Appearance is consistent with chronologic age.    General: The patient is oriented to person, place, time and date.  Follows 3-step directions.  Language with normal repetition, comprehension and naming.  Nondysarthric.    Cranial nerves: EOMI w/o nystagmus, skew or reported double vision.  PERRL.  No ptosis/weakness of eyelid closure.  Facial sensation is normal with normal bite.  No facial asymmetry.  Hearing grossly intact b/l.  Palate elevates midline.  Tongue midline.  Motor examination:   Normal tone, bulk and range of motion.  No tenderness, twitching, tremors or involuntary movements.  Formal Muscle Strength Testin/5 UE; 5/5 LE.  Slight left drift.  Reflexes:   2+ b/l   Sensory examination:   Intact to light touch and pinprick, pain  Cerebellum:   FTN/HKS intact with normal ELDON in all limbs.  No dysmetria or dysdiadokinesia.  Gait narrow based and normal.      VEEG 2018 - abnormal. large number of right hemispheric spikes, mild right hemispheric focal slowing  right hemispheric partial epilepsy    CTscan  2019        INTERPRETATION:  Clinical History / Reason for exam: Head trauma. Seizure.    TECHNIQUE: Multiple axial CT images of the head were obtained from the   base of skull to the vertex without the administration of IV contrast.      COMPARISON: CT head 3/21/2019 at 1:02 PM.      FINDINGS:    The ventricular system, basal cisterns, and sulcal pattern are   appropriate for patients age.    There is no acute extra-axial collection.  No mass effect, midline shift,   or hemorrhage is seen.      Gray-white differentiation appears grossly preserved.    There is no depressed skull fracture. Partially empty sella is noted.    There is mild mucosal thickening of the left ethmoid air cells.      IMPRESSION:      No evidence of acute intracranial pathology. Unchanged exam.

## 2019-03-22 NOTE — PROGRESS NOTE ADULT - PROBLEM SELECTOR PLAN 2
possibly aspiration pneumonia / pneumonitis secondary to seizure  elevated wbs  Levaquin   check repeat cbc and cxr

## 2019-03-23 ENCOUNTER — TRANSCRIPTION ENCOUNTER (OUTPATIENT)
Age: 29
End: 2019-03-23

## 2019-03-23 VITALS
HEART RATE: 83 BPM | RESPIRATION RATE: 16 BRPM | SYSTOLIC BLOOD PRESSURE: 124 MMHG | TEMPERATURE: 98 F | DIASTOLIC BLOOD PRESSURE: 67 MMHG

## 2019-03-23 LAB
ALBUMIN SERPL ELPH-MCNC: 4 G/DL — SIGNIFICANT CHANGE UP (ref 3.5–5.2)
ALP SERPL-CCNC: 70 U/L — SIGNIFICANT CHANGE UP (ref 30–115)
ALT FLD-CCNC: 13 U/L — SIGNIFICANT CHANGE UP (ref 0–41)
ANION GAP SERPL CALC-SCNC: 13 MMOL/L — SIGNIFICANT CHANGE UP (ref 7–14)
AST SERPL-CCNC: 17 U/L — SIGNIFICANT CHANGE UP (ref 0–41)
BILIRUB SERPL-MCNC: 1.2 MG/DL — SIGNIFICANT CHANGE UP (ref 0.2–1.2)
BUN SERPL-MCNC: 12 MG/DL — SIGNIFICANT CHANGE UP (ref 10–20)
CALCIUM SERPL-MCNC: 8.4 MG/DL — LOW (ref 8.5–10.1)
CHLORIDE SERPL-SCNC: 100 MMOL/L — SIGNIFICANT CHANGE UP (ref 98–110)
CO2 SERPL-SCNC: 27 MMOL/L — SIGNIFICANT CHANGE UP (ref 17–32)
CREAT SERPL-MCNC: 1.1 MG/DL — SIGNIFICANT CHANGE UP (ref 0.7–1.5)
GLUCOSE SERPL-MCNC: 95 MG/DL — SIGNIFICANT CHANGE UP (ref 70–99)
HCT VFR BLD CALC: 38.8 % — LOW (ref 42–52)
HGB BLD-MCNC: 12.9 G/DL — LOW (ref 14–18)
MCHC RBC-ENTMCNC: 29.2 PG — SIGNIFICANT CHANGE UP (ref 27–31)
MCHC RBC-ENTMCNC: 33.2 G/DL — SIGNIFICANT CHANGE UP (ref 32–37)
MCV RBC AUTO: 87.8 FL — SIGNIFICANT CHANGE UP (ref 80–94)
NRBC # BLD: 0 /100 WBCS — SIGNIFICANT CHANGE UP (ref 0–0)
PLATELET # BLD AUTO: 173 K/UL — SIGNIFICANT CHANGE UP (ref 130–400)
POTASSIUM SERPL-MCNC: 3.8 MMOL/L — SIGNIFICANT CHANGE UP (ref 3.5–5)
POTASSIUM SERPL-SCNC: 3.8 MMOL/L — SIGNIFICANT CHANGE UP (ref 3.5–5)
PROT SERPL-MCNC: 6.1 G/DL — SIGNIFICANT CHANGE UP (ref 6–8)
RBC # BLD: 4.42 M/UL — LOW (ref 4.7–6.1)
RBC # FLD: 13.2 % — SIGNIFICANT CHANGE UP (ref 11.5–14.5)
SODIUM SERPL-SCNC: 140 MMOL/L — SIGNIFICANT CHANGE UP (ref 135–146)
WBC # BLD: 9.15 K/UL — SIGNIFICANT CHANGE UP (ref 4.8–10.8)
WBC # FLD AUTO: 9.15 K/UL — SIGNIFICANT CHANGE UP (ref 4.8–10.8)

## 2019-03-23 RX ORDER — LEVETIRACETAM 250 MG/1
4 TABLET, FILM COATED ORAL
Qty: 240 | Refills: 0
Start: 2019-03-23

## 2019-03-23 RX ORDER — CIPROFLOXACIN LACTATE 400MG/40ML
1 VIAL (ML) INTRAVENOUS
Qty: 5 | Refills: 0
Start: 2019-03-23

## 2019-03-23 RX ADMIN — LEVETIRACETAM 2000 MILLIGRAM(S): 250 TABLET, FILM COATED ORAL at 05:52

## 2019-03-23 NOTE — PROGRESS NOTE ADULT - SUBJECTIVE AND OBJECTIVE BOX
28 year old man with epilepsy and breakthrough seizures    No events reported overnight. Patient is eating and sleeping well. No pain reported.   All other systems reviwed and reported negative.     MEDICATIONS:  heparin  Injectable 5000 Unit(s) SubCutaneous every 8 hours  levETIRAcetam 2000 milliGRAM(s) Oral two times a day  levoFLOXacin IVPB      levoFLOXacin IVPB 750 milliGRAM(s) IV Intermittent every 24 hours  LORazepam   Injectable 2 milliGRAM(s) IV Push three times a day PRN      VITALS:  T(C): 36.6 (03-23-19 @ 04:57), Max: 37.2 (03-22-19 @ 22:06)  HR: 71 (03-23-19 @ 04:57) (71 - 90)  BP: 102/62 (03-23-19 @ 04:57) (102/62 - 125/58)  RR: 16 (03-23-19 @ 04:57) (16 - 16)  SpO2: 96% (03-22-19 @ 08:09) (96% - 96%)    PHYSICAL EXAM  Lungs: CTA b/l; CVS: s1, s2 RRR; Abdomen: soft, NT ND BS+  Neurological exam:   Awake, alert and interactive; speech is fluent and language and memory are intact  PERRL, VFF, EOMI, No facial motor or sensory asymmetry, hearing is intact, tongue and palate are midline, shoulder shrug is equal  Tone is normal, Full strength in all extremities, Sensory exam is intact  No abnormal movements, No dysmetria or ataxia  DTRs 2+, Gait normal    Video EEG overnight shows a regular and reactive background with normal sleep patterns. There are no epileptiform  abnormalities in the study. There is mild right temporal slowing noted in during the recording. There were no subclinical or clinical ictal seizures noted. None of the patient's typical events were captured precluding direct electro-clinical correlation.
Chief Complaint:  Patient is a 28y old  Male who presents with a chief complaint of seizures (23 Mar 2019 08:00)      Interval Events:     Allergies:  No Known Allergies      Home Medications:    Hospital Medications:  heparin  Injectable 5000 Unit(s) SubCutaneous every 8 hours  levETIRAcetam 2000 milliGRAM(s) Oral two times a day  levoFLOXacin IVPB      levoFLOXacin IVPB 750 milliGRAM(s) IV Intermittent every 24 hours  LORazepam   Injectable 2 milliGRAM(s) IV Push three times a day PRN      PMHX/PSHX:  Seizures  Left testicular torsion  No significant past surgical history      Family history:  No pertinent family history in first degree relatives  No pertinent family history in first degree relatives      ROS:   As mentioned below      PHYSICAL EXAM:   Vital Signs:  Vital Signs Last 24 Hrs  T(C): 36.6 (23 Mar 2019 04:57), Max: 37.2 (22 Mar 2019 22:06)  T(F): 97.9 (23 Mar 2019 04:57), Max: 99 (22 Mar 2019 22:06)  HR: 71 (23 Mar 2019 04:57) (71 - 90)  BP: 102/62 (23 Mar 2019 04:57) (102/62 - 125/58)  BP(mean): --  RR: 16 (23 Mar 2019 04:57) (16 - 16)  SpO2: --  Daily Height in cm: 175.26 (23 Mar 2019 08:00)    Daily     GENERAL:  NAD  HEENT:  NC/AT,  No Thyromegaly  CHEST:  CTA B/L  HEART:  S1, S2- No M, R, G  ABDOMEN:  Soft, NT, ND  EXTEREMITIES:  no cyanosis,clubbing or edema  SKIN:  NAD  NEURO:  Grossly Nr.    LABS:                        12.9   9.15  )-----------( 173      ( 23 Mar 2019 06:57 )             38.8     03-23    140  |  100  |  12  ----------------------------<  95  3.8   |  27  |  1.1    Ca    8.4<L>      23 Mar 2019 06:57    TPro  6.1  /  Alb  4.0  /  TBili  1.2  /  DBili  x   /  AST  17  /  ALT  13  /  AlkPhos  70  03-23    LIVER FUNCTIONS - ( 23 Mar 2019 06:57 )  Alb: 4.0 g/dL / Pro: 6.1 g/dL / ALK PHOS: 70 U/L / ALT: 13 U/L / AST: 17 U/L / GGT: x             Urinalysis Basic - ( 21 Mar 2019 15:50 )    Color: Yellow / Appearance: Clear / S.010 / pH: x  Gluc: x / Ketone: Negative  / Bili: Negative / Urobili: 0.2 mg/dL   Blood: x / Protein: Negative mg/dL / Nitrite: Negative   Leuk Esterase: Negative / RBC: x / WBC x   Sq Epi: x / Non Sq Epi: x / Bacteria: x          Imaging:
Responded to rapid response call as pt  was fouhd  on the floor by the stafff. no witnessed seizure.  O/e p[t  alert  oriented  with no specific complaints. Systemic exam un remarkable;  { no frothing from the   mouth, tongue biting or incontinence.   No focl neurological deficit  No evidence of injuries 0r fractures
Patient is a 28y old  Male who presents with a chief complaint of seizure (21 Mar 2019 23:10)  pt reports waking up yesterday on the way to the hospital, he also reports some blood tinged sputum      T(F): 98 (03-22-19 @ 04:57), Max: 99.2 (03-21-19 @ 11:23)  HR: 81 (03-22-19 @ 04:57)  BP: 110/56 (03-22-19 @ 04:57)  RR: 16 (03-22-19 @ 04:57)  SpO2: 96% (03-22-19 @ 08:09) (94% - 100%)    PHYSICAL EXAM:  GENERAL: NAD  HEAD:  Atraumatic, Normocephalic  EYES: EOMI, PERRLA, conjunctiva and sclera clear  ENMT: No tonsillar erythema, exudates, or enlargement; Moist mucous membranes, Good dentition, No lesions  NECK: Supple, No JVD, Normal thyroid  NERVOUS SYSTEM:  Alert & Oriented X3, no focal deficit  CHEST/LUNG:  rales at left base  HEART: Regular rate and rhythm; No murmurs, rubs, or gallops  ABDOMEN: Soft, Nontender, Nondistended; Bowel sounds present  EXTREMITIES:  2+ Peripheral Pulses, No clubbing, cyanosis, or edema  LYMPH: No lymphadenopathy noted  SKIN: No rashes or lesions    LABS  03-21    139  |  100  |  14  ----------------------------<  75  4.1   |  26  |  1.1    Ca    9.2      21 Mar 2019 11:38  Phos  2.2     03-21  Mg     2.0     03-21    TPro  7.7  /  Alb  4.6  /  TBili  0.9  /  DBili  x   /  AST  14  /  ALT  13  /  AlkPhos  94  03-21                          14.4   18.50 )-----------( 196      ( 21 Mar 2019 11:20 )             43.3       RADIOLOGY  < from: CT Head No Cont (03.21.19 @ 23:36) >  IMPRESSION:      No evidence of acute intracranial pathology. Unchanged exam.      < from: Xray Chest 1 View-PORTABLE IMMEDIATE (03.21.19 @ 15:56) >  Impression:      Left lower lobe opacity.    < end of copied text >      MEDICATIONS  (STANDING):  heparin  Injectable 5000 Unit(s) SubCutaneous every 8 hours  levETIRAcetam 2000 milliGRAM(s) Oral two times a day  levoFLOXacin IVPB        MEDICATIONS  (PRN):

## 2019-03-23 NOTE — PROGRESS NOTE ADULT - PROBLEM SELECTOR PLAN 1
1. Complete VEEG monitoring and discharge patient home today  2. Continue Keppra 2000mg bid  3. Follow up with primary neurologist as scheduled  4. Seizure precautions    I discussed all of the above with the patient
continue keppra 2gm q12h, check level  check eeg  neuro consult  DVT prophylaxis

## 2019-03-23 NOTE — PROGRESS NOTE ADULT - ASSESSMENT
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
28y old  Male who presents with a chief complaint of seizure    Problem/Plan - 1:  ·  Problem: Seizures.  Plan: continue keppra 2gm q12h, check level  check eeg  neuro consult       Problem/Plan - 2:  ·  Problem: Pneumonia.  Plan: possibly aspiration pneumonia / pneumonitis secondary to seizure  elevated wbs  Levaquin   check repeat cbc and cxr.     DVT, GI prophylaxis  Disposition Planning : Awaiting VEEG - Pneumonia treatment    Pager No.  [phone]
?  seizure episode                                 Plan     will get ct studies  and follow up

## 2019-03-23 NOTE — DISCHARGE NOTE PROVIDER - HOSPITAL COURSE
28y old  Male who presents with a chief complaint of seizure      Pneumonia.  Plan: possibly aspiration pneumonia / pneumonitis secondary to seizure    Patient wants to go home today. he refuses to stay , and would like to take the antibiotic as as PO medication as otptient basis

## 2019-03-23 NOTE — DISCHARGE NOTE NURSING/CASE MANAGEMENT/SOCIAL WORK - NSDCDPATPORTLINK_GEN_ALL_CORE
You can access the [facility], offered by [facility], by registering with the following website: [email or web address]

## 2019-03-23 NOTE — DISCHARGE NOTE PROVIDER - NSDCCPCAREPLAN_GEN_ALL_CORE_FT
PRINCIPAL DISCHARGE DIAGNOSIS  Diagnosis: Seizures  Assessment and Plan of Treatment:       SECONDARY DISCHARGE DIAGNOSES  Diagnosis: Pneumonia  Assessment and Plan of Treatment:

## 2019-03-27 DIAGNOSIS — F12.10 CANNABIS ABUSE, UNCOMPLICATED: ICD-10-CM

## 2019-03-27 DIAGNOSIS — R56.9 UNSPECIFIED CONVULSIONS: ICD-10-CM

## 2019-03-27 DIAGNOSIS — J69.0 PNEUMONITIS DUE TO INHALATION OF FOOD AND VOMIT: ICD-10-CM

## 2019-03-27 DIAGNOSIS — G40.909 EPILEPSY, UNSPECIFIED, NOT INTRACTABLE, WITHOUT STATUS EPILEPTICUS: ICD-10-CM

## 2019-05-04 ENCOUNTER — INPATIENT (INPATIENT)
Facility: HOSPITAL | Age: 29
LOS: 0 days | Discharge: LEFT BEFORE DISCHARGE | End: 2019-05-05
Attending: INTERNAL MEDICINE | Admitting: INTERNAL MEDICINE
Payer: MEDICAID

## 2019-05-04 VITALS
DIASTOLIC BLOOD PRESSURE: 53 MMHG | HEART RATE: 96 BPM | SYSTOLIC BLOOD PRESSURE: 116 MMHG | OXYGEN SATURATION: 99 % | RESPIRATION RATE: 20 BRPM | TEMPERATURE: 98 F

## 2019-05-04 DIAGNOSIS — N44.00 TORSION OF TESTIS, UNSPECIFIED: Chronic | ICD-10-CM

## 2019-05-04 LAB
ALBUMIN SERPL ELPH-MCNC: 4 G/DL — SIGNIFICANT CHANGE UP (ref 3.5–5.2)
ALP SERPL-CCNC: 76 U/L — SIGNIFICANT CHANGE UP (ref 30–115)
ALT FLD-CCNC: 13 U/L — SIGNIFICANT CHANGE UP (ref 0–41)
ANION GAP SERPL CALC-SCNC: 11 MMOL/L — SIGNIFICANT CHANGE UP (ref 7–14)
AST SERPL-CCNC: 10 U/L — SIGNIFICANT CHANGE UP (ref 0–41)
BASOPHILS # BLD AUTO: 0.06 K/UL — SIGNIFICANT CHANGE UP (ref 0–0.2)
BASOPHILS NFR BLD AUTO: 0.6 % — SIGNIFICANT CHANGE UP (ref 0–1)
BILIRUB SERPL-MCNC: 0.3 MG/DL — SIGNIFICANT CHANGE UP (ref 0.2–1.2)
BUN SERPL-MCNC: 16 MG/DL — SIGNIFICANT CHANGE UP (ref 10–20)
CALCIUM SERPL-MCNC: 9 MG/DL — SIGNIFICANT CHANGE UP (ref 8.5–10.1)
CHLORIDE SERPL-SCNC: 102 MMOL/L — SIGNIFICANT CHANGE UP (ref 98–110)
CO2 SERPL-SCNC: 25 MMOL/L — SIGNIFICANT CHANGE UP (ref 17–32)
CREAT SERPL-MCNC: 1.1 MG/DL — SIGNIFICANT CHANGE UP (ref 0.7–1.5)
EOSINOPHIL # BLD AUTO: 0.08 K/UL — SIGNIFICANT CHANGE UP (ref 0–0.7)
EOSINOPHIL NFR BLD AUTO: 0.7 % — SIGNIFICANT CHANGE UP (ref 0–8)
GLUCOSE SERPL-MCNC: 95 MG/DL — SIGNIFICANT CHANGE UP (ref 70–99)
HCT VFR BLD CALC: 40 % — LOW (ref 42–52)
HGB BLD-MCNC: 13.5 G/DL — LOW (ref 14–18)
IMM GRANULOCYTES NFR BLD AUTO: 0.3 % — SIGNIFICANT CHANGE UP (ref 0.1–0.3)
LYMPHOCYTES # BLD AUTO: 1.46 K/UL — SIGNIFICANT CHANGE UP (ref 1.2–3.4)
LYMPHOCYTES # BLD AUTO: 13.4 % — LOW (ref 20.5–51.1)
MAGNESIUM SERPL-MCNC: 2.5 MG/DL — HIGH (ref 1.8–2.4)
MCHC RBC-ENTMCNC: 29.7 PG — SIGNIFICANT CHANGE UP (ref 27–31)
MCHC RBC-ENTMCNC: 33.8 G/DL — SIGNIFICANT CHANGE UP (ref 32–37)
MCV RBC AUTO: 87.9 FL — SIGNIFICANT CHANGE UP (ref 80–94)
MONOCYTES # BLD AUTO: 0.97 K/UL — HIGH (ref 0.1–0.6)
MONOCYTES NFR BLD AUTO: 8.9 % — SIGNIFICANT CHANGE UP (ref 1.7–9.3)
NEUTROPHILS # BLD AUTO: 8.26 K/UL — HIGH (ref 1.4–6.5)
NEUTROPHILS NFR BLD AUTO: 76.1 % — HIGH (ref 42.2–75.2)
NRBC # BLD: 0 /100 WBCS — SIGNIFICANT CHANGE UP (ref 0–0)
PLATELET # BLD AUTO: 195 K/UL — SIGNIFICANT CHANGE UP (ref 130–400)
POTASSIUM SERPL-MCNC: 4.4 MMOL/L — SIGNIFICANT CHANGE UP (ref 3.5–5)
POTASSIUM SERPL-SCNC: 4.4 MMOL/L — SIGNIFICANT CHANGE UP (ref 3.5–5)
PROT SERPL-MCNC: 6.3 G/DL — SIGNIFICANT CHANGE UP (ref 6–8)
RBC # BLD: 4.55 M/UL — LOW (ref 4.7–6.1)
RBC # FLD: 13.2 % — SIGNIFICANT CHANGE UP (ref 11.5–14.5)
SODIUM SERPL-SCNC: 138 MMOL/L — SIGNIFICANT CHANGE UP (ref 135–146)
WBC # BLD: 10.86 K/UL — HIGH (ref 4.8–10.8)
WBC # FLD AUTO: 10.86 K/UL — HIGH (ref 4.8–10.8)

## 2019-05-04 PROCEDURE — 95819 EEG AWAKE AND ASLEEP: CPT | Mod: 26

## 2019-05-04 PROCEDURE — 71045 X-RAY EXAM CHEST 1 VIEW: CPT | Mod: 26

## 2019-05-04 PROCEDURE — 99285 EMERGENCY DEPT VISIT HI MDM: CPT | Mod: 25

## 2019-05-04 RX ORDER — LEVETIRACETAM 250 MG/1
2000 TABLET, FILM COATED ORAL
Qty: 0 | Refills: 0 | Status: DISCONTINUED | OUTPATIENT
Start: 2019-05-04 | End: 2019-05-05

## 2019-05-04 RX ORDER — ACETAMINOPHEN 500 MG
650 TABLET ORAL EVERY 6 HOURS
Qty: 0 | Refills: 0 | Status: DISCONTINUED | OUTPATIENT
Start: 2019-05-04 | End: 2019-05-05

## 2019-05-04 RX ORDER — ENOXAPARIN SODIUM 100 MG/ML
40 INJECTION SUBCUTANEOUS DAILY
Qty: 0 | Refills: 0 | Status: DISCONTINUED | OUTPATIENT
Start: 2019-05-05 | End: 2019-05-05

## 2019-05-04 RX ORDER — SODIUM CHLORIDE 9 MG/ML
1000 INJECTION INTRAMUSCULAR; INTRAVENOUS; SUBCUTANEOUS ONCE
Qty: 0 | Refills: 0 | Status: COMPLETED | OUTPATIENT
Start: 2019-05-04 | End: 2019-05-04

## 2019-05-04 RX ADMIN — LEVETIRACETAM 2000 MILLIGRAM(S): 250 TABLET, FILM COATED ORAL at 22:05

## 2019-05-04 RX ADMIN — Medication 2 MILLIGRAM(S): at 07:25

## 2019-05-04 RX ADMIN — Medication 270 MILLIGRAM(S): at 09:56

## 2019-05-04 RX ADMIN — SODIUM CHLORIDE 1000 MILLILITER(S): 9 INJECTION INTRAMUSCULAR; INTRAVENOUS; SUBCUTANEOUS at 06:11

## 2019-05-04 RX ADMIN — LEVETIRACETAM 2000 MILLIGRAM(S): 250 TABLET, FILM COATED ORAL at 14:11

## 2019-05-04 RX ADMIN — SODIUM CHLORIDE 1000 MILLILITER(S): 9 INJECTION INTRAMUSCULAR; INTRAVENOUS; SUBCUTANEOUS at 07:44

## 2019-05-04 NOTE — ED ADULT NURSE REASSESSMENT NOTE - NS ED NURSE REASSESS COMMENT FT1
0138 pt had a seizure MD at bedside. Ativan 2 mg IVP administered as ordered. Mother at bedside. Safety and comfort maintained. Airway patent.

## 2019-05-04 NOTE — ED PROVIDER NOTE - CLINICAL SUMMARY MEDICAL DECISION MAKING FREE TEXT BOX
Pt has a history of seizures. Presents with seizure 3 am. He is with MOM. At about 7:20 am I witnessed pt having seizure. Ativan 2 mg IV given. Pt woke confused. Pt has a history of seizures. Presents with seizure 3 am. He is with MOM. At about 7:20 am I witnessed pt having seizure. Ativan 2 mg IV given. Pt woke confused.  Pt reluctantly agreed to stay . Dilantin 1 gm load ordered.

## 2019-05-04 NOTE — H&P ADULT - NSHPLABSRESULTS_GEN_ALL_CORE
13.5   10.86 )-----------( 195      ( 04 May 2019 06:33 )             40.0   05-04    138  |  102  |  16  ----------------------------<  95  4.4   |  25  |  1.1    Ca    9.0      04 May 2019 06:33  Mg     2.5     05-04    TPro  6.3  /  Alb  4.0  /  TBili  0.3  /  DBili  x   /  AST  10  /  ALT  13  /  AlkPhos  76  05-04    Xray Chest 1 View- PORTABLE-Routine (05.04.19 @ 06:18)     Impression:      No radiographic evidence of acute cardiopulmonary disease. 13.5   10.86 )-----------( 195      ( 04 May 2019 06:33 )             40.0   05-04    138  |  102  |  16  ----------------------------<  95  4.4   |  25  |  1.1    Ca    9.0      04 May 2019 06:33  Mg     2.5     05-04    TPro  6.3  /  Alb  4.0  /  TBili  0.3  /  DBili  x   /  AST  10  /  ALT  13  /  AlkPhos  76  05-04    Xray Chest 1 View- PORTABLE-Routine (05.04.19 @ 06:18)     Impression:      No radiographic evidence of acute cardiopulmonary disease.    *** OLD RECORDS REVIEWED

## 2019-05-04 NOTE — H&P ADULT - HISTORY OF PRESENT ILLNESS
· HPI Objective Statement: 27yo M history of seizure d/o on Keppra 2000mg BID followed by Dr. Martell in [place] recently admitted for breakthrough seizures and PNA, had EEG that showed slowing, dc-d, presenting with seizure- since discharge, did well, followed with his neurologist, no changes to medications. This evening, mom heard loud noise in bedroom, found pt with his eyes rolled back, lasted a few minutes, self resolved, pt post ictal for 10min then back to baseline. Pt currently with no complaints- denies recent head injury, headache vision changes f/c/n/v/d, chest pain, shortness of breath, dysuria/hematuria, back pain, numbness/focal weakness. Thinks he bit the inside of his cheeks but denies urinary/fecal incontinence · HPI Objective Statement: 29yo M history of seizure d/o on Keppra 2000mg BID followed by Dr. Martell in [place] recently admitted for breakthrough seizures and PNA, had EEG that showed slowing, dc-d, presenting with seizure- since discharge, did well, followed with his neurologist, no changes to medications. This evening, mom heard loud noise in bedroom, found pt with his eyes rolled back, lasted a few minutes, self resolved, pt post ictal for 10min then back to baseline. Pt currently with no complaints- denies recent head injury, headache vision changes f/c/n/v/d, chest pain, shortness of breath, dysuria/hematuria, back pain, numbness/focal weakness. Thinks he bit the inside of his cheeks but denies urinary/fecal incontinence    At the time of admission, pt in no acute distress and is speaking in full sentences, requesting lunch, aware of the inpatient monitoring.  Will admit for monitoring with Neuro consult for VEEG if needed

## 2019-05-04 NOTE — H&P ADULT - NSHPPHYSICALEXAM_GEN_ALL_CORE
Vital Signs Last 24 Hrs  T(C): 36.8 (04 May 2019 10:41), Max: 36.8 (04 May 2019 10:41)  T(F): 98.2 (04 May 2019 10:41), Max: 98.2 (04 May 2019 10:41)  HR: 69 (04 May 2019 10:41) (69 - 114)  BP: 116/62 (04 May 2019 10:41) (116/53 - 117/54)  BP(mean): --  RR: 17 (04 May 2019 10:41) (17 - 20)  SpO2: 99% (04 May 2019 10:41) (97% - 99%)    PHYSICAL EXAM:  GENERAL: NAD  HEAD:  Atraumatic, Normocephalic  EYES: EOMI, PERRLA, conjunctiva and sclera clear  NERVOUS SYSTEM:  Alert & Oriented X 4, Good concentration; Motor Strength 5/5 B/L upper and lower extremities; DTRs 2+ intact and symmetric  CHEST/LUNG: Clear to percussion bilaterally; No rales, rhonchi, wheezing, or rubs  CV/HEART: Regular rate and rhythm; No murmurs, rubs, or gallops  GI/ABDOMEN: Soft, Nontender, Nondistended; Bowel sounds present  EXTREMITIES:  2+ Peripheral Pulses, No clubbing, cyanosis, or edema  SKIN: No rashes or lesions Vital Signs Last 24 Hrs  T(C): 36.8 (04 May 2019 10:41), Max: 36.8 (04 May 2019 10:41)  T(F): 98.2 (04 May 2019 10:41), Max: 98.2 (04 May 2019 10:41)  HR: 69 (04 May 2019 10:41) (69 - 114)  BP: 116/62 (04 May 2019 10:41) (116/53 - 117/54)  BP(mean): --  RR: 17 (04 May 2019 10:41) (17 - 20)  SpO2: 99% (04 May 2019 10:41) (97% - 99%)    PHYSICAL EXAM:  GENERAL: NAD, mild lethargy   HEAD:  Atraumatic, Normocephalic  EYES: EOMI, PERRLA, conjunctiva and sclera clear  NERVOUS SYSTEM:  Alert & Oriented X 3  CHEST/LUNG: Clear to percussion bilaterally; No rales, rhonchi, wheezing, or rubs  CV/HEART: Regular rate and rhythm; No murmurs, rubs, or gallops  GI/ABDOMEN: Soft, Nontender, Nondistended; Bowel sounds present  EXTREMITIES:  2+ Peripheral Pulses, No clubbing, cyanosis, or edema  SKIN: No rashes or lesions Vital Signs Last 24 Hrs  T(C): 36.8 (04 May 2019 10:41), Max: 36.8 (04 May 2019 10:41)  T(F): 98.2 (04 May 2019 10:41), Max: 98.2 (04 May 2019 10:41)  HR: 69 (04 May 2019 10:41) (69 - 114)  BP: 116/62 (04 May 2019 10:41) (116/53 - 117/54)  RR: 17 (04 May 2019 10:41) (17 - 20)  SpO2: 99% (04 May 2019 10:41) (97% - 99%)    PHYSICAL EXAM:  GENERAL: NAD, mild lethargy   HEAD:  Atraumatic, Normocephalic  EYES: EOMI, PERRLA, conjunctiva and sclera clear  NERVOUS SYSTEM:  Alert & Oriented X 3  CHEST/LUNG: Clear to percussion bilaterally; No rales, rhonchi, wheezing, or rubs  CV/HEART: Regular rate and rhythm; No murmurs, rubs, or gallops  GI/ABDOMEN: Soft, Nontender, Nondistended; Bowel sounds present  EXTREMITIES:  2+ Peripheral Pulses, No clubbing, cyanosis, or edema  SKIN: No rashes or lesions

## 2019-05-04 NOTE — H&P ADULT - NSICDXFAMILYHX_GEN_ALL_CORE_FT
FAMILY HISTORY:  No pertinent family history in first degree relatives FAMILY HISTORY:  Family history of seizures, Maternal Aunt

## 2019-05-04 NOTE — ED PROVIDER NOTE - PROGRESS NOTE DETAILS
sourav Rosa- pt maxed on Keppra, if ED workup within normal limits, can likely discharge with close follow-up with his neurologist to potentially add on a second AED pt signed out to Dr. Hubbard pending labs, reevaluation, dispo. Seizure at 7:20 am. Treated with Ativan IV Pt with recurrent seizure, ativan given.  sleeping now.

## 2019-05-04 NOTE — H&P ADULT - NSHPREVIEWOFSYSTEMS_GEN_ALL_CORE
REVIEW OF SYSTEMS:  CONSTITUTIONAL: fatigue present   EYES: No eye pain, visual disturbances, or discharge  NECK: No pain or stiffness  RESPIRATORY: No cough, wheezing, chills or hemoptysis; No shortness of breath  CARDIOVASCULAR: No chest pain, palpitations, dizziness, or leg swelling  GASTROINTESTINAL: No abdominal or epigastric pain. No nausea, vomiting, or hematemesis; No diarrhea or constipation. No melena or hematochezia.  GENITOURINARY: No dysuria, frequency, hematuria, or incontinence  NEUROLOGICAL: No headaches, memory loss, loss of strength, numbness, or tremors  MUSCULOSKELETAL: No joint pain or swelling; No muscle, back, or extremity pain

## 2019-05-04 NOTE — ED PROVIDER NOTE - PHYSICAL EXAMINATION
Constitutional: Well appearing. No acute distress. Non toxic.   Eyes: PERRLA. Extraocular movements intact, no entrapment. Conjunctiva normal.   ENT: No nasal discharge. Moist mucus membranes. +buccal contusions/abrasions, no active bleeding  Neck: Supple, non tender, full range of motion.  CV: RRR no murmurs, rubs, or gallops. +S1S2.   Pulm: Clear to auscultation bilaterally. Normal work of breathing.  Abd: soft NT ND +BS.   Ext: Warm and well perfused x4, moving all extremities, no edema.   Psy: Cooperative, appropriate.   Skin: Warm, dry, no rash  Neuro: CN2-12 grossly intact no sensory or motor deficits throughout, no drift, no ataxia, rapid alternating within normal limits, neg romberg.

## 2019-05-04 NOTE — ED PROVIDER NOTE - ATTENDING CONTRIBUTION TO CARE
28 y.o. male, PMH of seizures, on Keppra, BIBA after mother found him in the bedroom having a seizure. Pt is at baseline now. No HA. No CP/SOB, abdominal pain. No weakness. On exam, pt in NAD, AAOx3, head NC/AT, CN II-XII intact, lungs CTA B/L, CV S1S2 regular, abdomen soft/NT/ND/(+)BS, ext (-) edema, motor 5/5x4, sensation intact. Will do labs and reevaluate.

## 2019-05-04 NOTE — ED ADULT NURSE NOTE - NSIMPLEMENTINTERV_GEN_ALL_ED
Implemented All Universal Safety Interventions:  [place] to call system. Call bell, personal items and telephone within reach. Instruct patient to call for assistance. Room bathroom lighting operational. Non-slip footwear when patient is off stretcher. Physically safe environment: no spills, clutter or unnecessary equipment. Stretcher in lowest position, wheels locked, appropriate side rails in place.

## 2019-05-04 NOTE — H&P ADULT - ASSESSMENT
patient with breakthrough seizures    1) Seizure disorder  -Epilepsy consult   -seizure precautions  -may need inpatient VEEG monitoring    2) Recent PNA  -on levaquin    oob to chair as tolerated      # Progress Note Handoff  PENDING as follows  consults: Epilepsy consult   Test: VEEG if recommended by Epilepsy  Other:  Family discussion:  Disposition:  Home __once cleared by Neurology __ or SNF ____ Other _____ Unknown at this time ____ patient with breakthrough seizures    1) Seizure disorder  -Epilepsy consult   -seizure precautions  -may need inpatient VEEG monitoring    2) Recent PNA  -finished course of levaquin  -CXR on admission is clear     oob to chair as tolerated      # Progress Note Handoff  PENDING as follows  consults: Epilepsy consult   Test: VEEG if recommended by Epilepsy  Other:  Family discussion:  Disposition:  Home __once cleared by Neurology __ or SNF ____ Other _____ Unknown at this time ____ patient with breakthrough seizures    1) Seizure disorder  -Epilepsy consult   -seizure precautions  -may need inpatient VEEG monitoring    2) Recent PNA  -finished course of levaquin  -CXR on admission is clear     3) Obesity  -BMI > 30  -weight loss and diet modification     oob to chair as tolerated      # Progress Note Handoff  PENDING as follows  consults: Epilepsy consult   Test: VEEG if recommended by Epilepsy  Other:  Family discussion:  Disposition:  Home __once cleared by Neurology __ or SNF ____ Other _____ Unknown at this time ____

## 2019-05-04 NOTE — ED PROVIDER NOTE - OBJECTIVE STATEMENT
29yo M history of seizure d/o on Keppra 2000mg [facility] seizures and [facility], had EEG that showed slowing, dc-d, presenting with seizure- since discharge, did well, followed with his neurologist, no changes to medications. This evening, mom heard loud noise in bedroom, found pt with his eyes rolled back, lasted a few minutes, self resolved, pt post ictal for 10min then back to baseline. Pt currently with no complaints- denies recent head injury, headache vision changes f/c/n/v/d, chest pain, shortness of breath, dysuria/hematuria, back pain, numbness/focal weakness. Thinks he bit the inside of his cheeks but denies urinary/fecal incontinence

## 2019-05-04 NOTE — ED PROVIDER NOTE - NS ED ROS FT
Constitutional:  See HPI.   Eyes:  No visual changes, eye pain or discharge.  ENMT:  No hearing changes, pain, discharge or infections. No neck pain or stiffness.  Cardiac:  No chest pain, SOB or edema. No chest pain with exertion.  Respiratory:  No cough or respiratory distress. No hemoptysis.  GI:  No nausea, vomiting, diarrhea, abdominal pain.  :  No dysuria, frequency, hematuria  MS:  No joint pain or back pain.  Neuro:  No headache or weakness.    Skin:  No skin rash.  Except as in HPI, all other review of systems is negative

## 2019-05-05 VITALS
RESPIRATION RATE: 16 BRPM | TEMPERATURE: 97 F | HEART RATE: 71 BPM | DIASTOLIC BLOOD PRESSURE: 55 MMHG | SYSTOLIC BLOOD PRESSURE: 102 MMHG

## 2019-05-05 LAB
ALBUMIN SERPL ELPH-MCNC: 4 G/DL — SIGNIFICANT CHANGE UP (ref 3.5–5.2)
ALP SERPL-CCNC: 74 U/L — SIGNIFICANT CHANGE UP (ref 30–115)
ALT FLD-CCNC: 12 U/L — SIGNIFICANT CHANGE UP (ref 0–41)
ANION GAP SERPL CALC-SCNC: 11 MMOL/L — SIGNIFICANT CHANGE UP (ref 7–14)
AST SERPL-CCNC: 8 U/L — SIGNIFICANT CHANGE UP (ref 0–41)
BILIRUB SERPL-MCNC: 0.9 MG/DL — SIGNIFICANT CHANGE UP (ref 0.2–1.2)
BUN SERPL-MCNC: 13 MG/DL — SIGNIFICANT CHANGE UP (ref 10–20)
CALCIUM SERPL-MCNC: 8.7 MG/DL — SIGNIFICANT CHANGE UP (ref 8.5–10.1)
CHLORIDE SERPL-SCNC: 102 MMOL/L — SIGNIFICANT CHANGE UP (ref 98–110)
CO2 SERPL-SCNC: 27 MMOL/L — SIGNIFICANT CHANGE UP (ref 17–32)
CREAT SERPL-MCNC: 1.2 MG/DL — SIGNIFICANT CHANGE UP (ref 0.7–1.5)
GLUCOSE SERPL-MCNC: 83 MG/DL — SIGNIFICANT CHANGE UP (ref 70–99)
HCT VFR BLD CALC: 41 % — LOW (ref 42–52)
HGB BLD-MCNC: 13.5 G/DL — LOW (ref 14–18)
LACTATE SERPL-SCNC: 0.9 MMOL/L — SIGNIFICANT CHANGE UP (ref 0.5–2.2)
MCHC RBC-ENTMCNC: 29.5 PG — SIGNIFICANT CHANGE UP (ref 27–31)
MCHC RBC-ENTMCNC: 32.9 G/DL — SIGNIFICANT CHANGE UP (ref 32–37)
MCV RBC AUTO: 89.5 FL — SIGNIFICANT CHANGE UP (ref 80–94)
NRBC # BLD: 0 /100 WBCS — SIGNIFICANT CHANGE UP (ref 0–0)
PLATELET # BLD AUTO: 184 K/UL — SIGNIFICANT CHANGE UP (ref 130–400)
POTASSIUM SERPL-MCNC: 4.1 MMOL/L — SIGNIFICANT CHANGE UP (ref 3.5–5)
POTASSIUM SERPL-SCNC: 4.1 MMOL/L — SIGNIFICANT CHANGE UP (ref 3.5–5)
PROT SERPL-MCNC: 6.1 G/DL — SIGNIFICANT CHANGE UP (ref 6–8)
RBC # BLD: 4.58 M/UL — LOW (ref 4.7–6.1)
RBC # FLD: 13.2 % — SIGNIFICANT CHANGE UP (ref 11.5–14.5)
SODIUM SERPL-SCNC: 140 MMOL/L — SIGNIFICANT CHANGE UP (ref 135–146)
WBC # BLD: 7.75 K/UL — SIGNIFICANT CHANGE UP (ref 4.8–10.8)
WBC # FLD AUTO: 7.75 K/UL — SIGNIFICANT CHANGE UP (ref 4.8–10.8)

## 2019-05-05 PROCEDURE — 95819 EEG AWAKE AND ASLEEP: CPT | Mod: 26

## 2019-05-05 RX ORDER — LEVETIRACETAM 250 MG/1
1 TABLET, FILM COATED ORAL
Qty: 60 | Refills: 0
Start: 2019-05-05 | End: 2019-06-03

## 2019-05-05 RX ADMIN — LEVETIRACETAM 2000 MILLIGRAM(S): 250 TABLET, FILM COATED ORAL at 05:21

## 2019-05-05 NOTE — CHART NOTE - NSCHARTNOTEFT_GEN_A_CORE
patient left against medical advice (pt is AA O4; capable of making his own decisions)  -He did not want to stay for Neurology recommendations and wanted to leave the hospital; understood the consequences of breakthrough seizures and or readmissions  -I did send in escript of Keppra to his pharmacy so he has enough supply at home and he promised to see his neurologist asap

## 2019-05-05 NOTE — PROGRESS NOTE ADULT - SUBJECTIVE AND OBJECTIVE BOX
CHARLINE CHAMBERS  28y  Male      Patient is a 28y old  Male who presents with a chief complaint of breakthrough seizures (04 May 2019 11:47)      INTERVAL HPI/OVERNIGHT EVENTS:    pt seen and examined at bedside this morning; wants to go AMA in couple hours; explained that patient will be discharged once seen and cleared by the neurologist.   -he may AMA in the afternoon depending on his mood he says  -otherwise no significant events overnight       Vital Signs Last 24 Hrs  T(C): 35.9 (05 May 2019 06:12), Max: 36.9 (04 May 2019 21:02)  T(F): 96.6 (05 May 2019 06:12), Max: 98.5 (04 May 2019 21:02)  HR: 71 (05 May 2019 06:12) (63 - 73)  BP: 102/55 (05 May 2019 06:12) (102/55 - 120/68)  RR: 16 (05 May 2019 06:12) (16 - 17)  SpO2: 99% (04 May 2019 10:41) (99% - 99%)    PHYSICAL EXAM:  GENERAL: NAD, well-groomed, well-developed  HEAD:  Atraumatic, Normocephalic  EYES: EOMI, PERRLA, conjunctiva and sclera clear  NERVOUS SYSTEM:  Alert & Oriented X 3  CHEST/LUNG: Clear to percussion bilaterally; No rales, rhonchi, wheezing, or rubs  CV/HEART: Regular rate and rhythm; No murmurs, rubs, or gallops  GI/ABDOMEN: Soft, Nontender, Nondistended; Bowel sounds present  EXTREMITIES:  2+ Peripheral Pulses, No clubbing, cyanosis, or edema  SKIN: No rashes or lesions    LAB:                        13.5   7.75  )-----------( 184      ( 05 May 2019 06:12 )             41.0     05-05    140  |  102  |  13  ----------------------------<  83  4.1   |  27  |  1.2    Ca    8.7      05 May 2019 06:12  Mg     2.5     05-04    TPro  6.1  /  Alb  4.0  /  TBili  0.9  /  DBili  x   /  AST  8   /  ALT  12  /  AlkPhos  74  05-05    Daily Height in cm: 175.26 (04 May 2019 10:41)    Daily   CAPILLARY BLOOD GLUCOSE      LIVER FUNCTIONS - ( 05 May 2019 06:12 )  Alb: 4.0 g/dL / Pro: 6.1 g/dL / ALK PHOS: 74 U/L / ALT: 12 U/L / AST: 8 U/L / GGT: x           RADIOLOGY:    Imaging Personally Reviewed:  [y ] YES  [ ] NO    HEALTH ISSUES - PROBLEM Dx:    MEDS:  acetaminophen   Tablet .. 650 milliGRAM(s) Oral every 6 hours PRN  enoxaparin Injectable 40 milliGRAM(s) SubCutaneous daily  levETIRAcetam 2000 milliGRAM(s) Oral two times a day  LORazepam   Injectable 2 milliGRAM(s) IV Push once PRN

## 2019-05-05 NOTE — PROGRESS NOTE ADULT - ASSESSMENT
27yo M history of seizure d/o on Keppra 2000mg BID followed by Dr. Martell in [place] recently admitted for breakthrough seizures and PNA, had EEG that showed slowing, dc-d, presenting with seizure- since discharge, did well, followed with his neurologist, no changes to medications. This evening, mom heard loud noise in bedroom, found pt with his eyes rolled back, lasted a few minutes, self resolved, pt post ictal for 10min then back to baseline. Pt currently with no complaints- denies recent head injury, headache vision changes f/c/n/v/d, chest pain, shortness of breath, dysuria/hematuria, back pain, numbness/focal weakness. Thinks he bit the inside of his cheeks but denies urinary/fecal incontinence    At the time of admission, pt in no acute distress and is speaking in full sentences, requesting lunch, aware of the inpatient monitoring.  Will admit for monitoring with Neuro consult for VEEG if needed     patient with breakthrough seizures    1) Seizure disorder  -Epilepsy consult   -seizure precautions  -may need inpatient VEEG monitoring    2) Recent PNA  -finished course of levaquin  -CXR on admission is clear     3) Obesity  -BMI > 30  -weight loss and diet modification     oob to chair as tolerated      # Progress Note Handoff  PENDING as follows  consults: Epilepsy consult Pending   Test: f/u REEG results   Other:  Family discussion: discussed with patient; understands the plan of care   Disposition:  Home __once cleared by Neurology today __ or SNF ____ Other _____ Unknown at this time ____

## 2019-05-07 LAB — LEVETIRACETAM SERPL-MCNC: 30.6 MCG/ML — SIGNIFICANT CHANGE UP (ref 12–46)

## 2019-05-09 DIAGNOSIS — G40.909 EPILEPSY, UNSPECIFIED, NOT INTRACTABLE, WITHOUT STATUS EPILEPTICUS: ICD-10-CM

## 2019-05-09 DIAGNOSIS — E66.9 OBESITY, UNSPECIFIED: ICD-10-CM

## 2019-05-22 ENCOUNTER — EMERGENCY (EMERGENCY)
Facility: HOSPITAL | Age: 29
LOS: 0 days | Discharge: HOME | End: 2019-05-22
Attending: EMERGENCY MEDICINE | Admitting: EMERGENCY MEDICINE
Payer: MEDICAID

## 2019-05-22 VITALS
DIASTOLIC BLOOD PRESSURE: 62 MMHG | OXYGEN SATURATION: 96 % | HEIGHT: 68 IN | HEART RATE: 82 BPM | WEIGHT: 205.03 LBS | RESPIRATION RATE: 16 BRPM | TEMPERATURE: 98 F | SYSTOLIC BLOOD PRESSURE: 115 MMHG

## 2019-05-22 VITALS
OXYGEN SATURATION: 99 % | DIASTOLIC BLOOD PRESSURE: 51 MMHG | SYSTOLIC BLOOD PRESSURE: 104 MMHG | TEMPERATURE: 98 F | HEART RATE: 70 BPM | RESPIRATION RATE: 16 BRPM

## 2019-05-22 DIAGNOSIS — N44.00 TORSION OF TESTIS, UNSPECIFIED: Chronic | ICD-10-CM

## 2019-05-22 DIAGNOSIS — R56.9 UNSPECIFIED CONVULSIONS: ICD-10-CM

## 2019-05-22 DIAGNOSIS — G40.909 EPILEPSY, UNSPECIFIED, NOT INTRACTABLE, WITHOUT STATUS EPILEPTICUS: ICD-10-CM

## 2019-05-22 DIAGNOSIS — R05 COUGH: ICD-10-CM

## 2019-05-22 DIAGNOSIS — Z79.899 OTHER LONG TERM (CURRENT) DRUG THERAPY: ICD-10-CM

## 2019-05-22 LAB
ALBUMIN SERPL ELPH-MCNC: 4 G/DL — SIGNIFICANT CHANGE UP (ref 3.5–5.2)
ALP SERPL-CCNC: 73 U/L — SIGNIFICANT CHANGE UP (ref 30–115)
ALT FLD-CCNC: 20 U/L — SIGNIFICANT CHANGE UP (ref 0–41)
ANION GAP SERPL CALC-SCNC: 9 MMOL/L — SIGNIFICANT CHANGE UP (ref 7–14)
AST SERPL-CCNC: 12 U/L — SIGNIFICANT CHANGE UP (ref 0–41)
BILIRUB SERPL-MCNC: <0.2 MG/DL — SIGNIFICANT CHANGE UP (ref 0.2–1.2)
BUN SERPL-MCNC: 19 MG/DL — SIGNIFICANT CHANGE UP (ref 10–20)
CALCIUM SERPL-MCNC: 8.9 MG/DL — SIGNIFICANT CHANGE UP (ref 8.5–10.1)
CHLORIDE SERPL-SCNC: 103 MMOL/L — SIGNIFICANT CHANGE UP (ref 98–110)
CO2 SERPL-SCNC: 30 MMOL/L — SIGNIFICANT CHANGE UP (ref 17–32)
CREAT SERPL-MCNC: 1 MG/DL — SIGNIFICANT CHANGE UP (ref 0.7–1.5)
GLUCOSE SERPL-MCNC: 101 MG/DL — HIGH (ref 70–99)
HCT VFR BLD CALC: 40.6 % — LOW (ref 42–52)
HGB BLD-MCNC: 13.5 G/DL — LOW (ref 14–18)
MCHC RBC-ENTMCNC: 29.7 PG — SIGNIFICANT CHANGE UP (ref 27–31)
MCHC RBC-ENTMCNC: 33.3 G/DL — SIGNIFICANT CHANGE UP (ref 32–37)
MCV RBC AUTO: 89.2 FL — SIGNIFICANT CHANGE UP (ref 80–94)
NRBC # BLD: 0 /100 WBCS — SIGNIFICANT CHANGE UP (ref 0–0)
PLATELET # BLD AUTO: 170 K/UL — SIGNIFICANT CHANGE UP (ref 130–400)
POTASSIUM SERPL-MCNC: 4.6 MMOL/L — SIGNIFICANT CHANGE UP (ref 3.5–5)
POTASSIUM SERPL-SCNC: 4.6 MMOL/L — SIGNIFICANT CHANGE UP (ref 3.5–5)
PROT SERPL-MCNC: 6.9 G/DL — SIGNIFICANT CHANGE UP (ref 6–8)
RBC # BLD: 4.55 M/UL — LOW (ref 4.7–6.1)
RBC # FLD: 13.1 % — SIGNIFICANT CHANGE UP (ref 11.5–14.5)
SODIUM SERPL-SCNC: 142 MMOL/L — SIGNIFICANT CHANGE UP (ref 135–146)
WBC # BLD: 6.28 K/UL — SIGNIFICANT CHANGE UP (ref 4.8–10.8)
WBC # FLD AUTO: 6.28 K/UL — SIGNIFICANT CHANGE UP (ref 4.8–10.8)

## 2019-05-22 PROCEDURE — 71045 X-RAY EXAM CHEST 1 VIEW: CPT | Mod: 26

## 2019-05-22 PROCEDURE — 99284 EMERGENCY DEPT VISIT MOD MDM: CPT | Mod: 25

## 2019-05-22 RX ORDER — LEVETIRACETAM 250 MG/1
1000 TABLET, FILM COATED ORAL EVERY 12 HOURS
Refills: 0 | Status: DISCONTINUED | OUTPATIENT
Start: 2019-05-22 | End: 2019-05-22

## 2019-05-22 RX ADMIN — LEVETIRACETAM 400 MILLIGRAM(S): 250 TABLET, FILM COATED ORAL at 08:57

## 2019-05-22 RX ADMIN — LEVETIRACETAM 1000 MILLIGRAM(S): 250 TABLET, FILM COATED ORAL at 09:19

## 2019-05-22 NOTE — ED PROVIDER NOTE - NSFOLLOWUPINSTRUCTIONS_ED_ALL_ED_FT
Seizure in Adults    WHAT YOU NEED TO KNOW:    A seizure is a burst of electrical activity in your brain. A seizure may start in one part of your brain, or both sides may be affected. The seizure may last a few seconds or up to 5 minutes. A new-onset seizure is a seizure that happens for the first time. Some common triggers are alcohol, drugs, lack of sleep, fever, or an infection. High or low blood sugar levels, pregnancy, a head injury, or a stroke could also trigger a seizure. The cause of your seizure may not be known. You have a higher risk for another seizure within the next 2 years.    DISCHARGE INSTRUCTIONS:    Call your local emergency number (911 in the ) or have someone else call for any of the following:     Your seizure lasts longer than 5 minutes.      You have a second seizure that happens within 24 hours of your first.      You have trouble breathing after a seizure.      You cannot be woken after your seizure.      You have more than 1 seizure before you are fully awake or aware.    Call your doctor if:     You are injured during a seizure.      You have a fever.      You are planning to get pregnant or are currently pregnant.      You have questions or concerns about your condition or care.    Medicines: You may be given the following:     Antiepileptic medicine may control or prevent another seizure. Do not stop taking this medicine without direction from a healthcare provider.      Antibiotics treat an infection caused by bacteria.      Take your medicine as directed. Contact your healthcare provider if you think your medicine is not helping or if you have side effects. Tell him or her if you are allergic to any medicine. Keep a list of the medicines, vitamins, and herbs you take. Include the amounts, and when and why you take them. Bring the list or the pill bottles to follow-up visits. Carry your medicine list with you in case of an emergency.    What you can do to manage or prevent a seizure:     Manage stress. Stress can trigger a seizure. Exercise can help you reduce stress. Talk to your healthcare provider about exercise that is safe for you. Other ways to manage stress include yoga, meditation, and biofeedback. Illness can be a form of stress. Eat a variety of healthy foods and drink plenty of liquids during an illness.      Set a regular sleep schedule. A lack of sleep can trigger a seizure. Try to go to sleep and wake up at the same times every day. Keep your bedroom quiet and dark. Talk to your healthcare provider if you are having trouble sleeping.      Manage other medical conditions. Manage other health conditions that may increase your risk for a seizure. Keep your blood sugar levels and blood pressure under control.      Limit or do not drink alcohol as directed. Alcohol can trigger a seizure, especially if you drink a large amount at one time. A drink of alcohol is 12 ounces of beer, 1½ ounces of liquor, or 5 ounces of wine. Talk to your healthcare provider about a safe amount of alcohol for you. Your provider may recommend that you do not drink any alcohol. Tell him or her if you need help to quit drinking.      Ask what safety precautions you should take. Talk with your healthcare provider about driving. You may not be able to drive until you are seizure-free for a period of time. You will need to check the law where you live. Also talk to your healthcare provider about swimming and bathing. You may drown or develop life-threatening heart or lung damage if you have a seizure in water.      Tell your friends, family members, and coworkers that you had a seizure. Give them written instructions to follow if you have another seizure.    Follow up with your healthcare provider or neurologist as directed: You may need more tests to find the cause of your seizure. Write down your questions so you remember to ask them during your visits.        © Copyright [employer] 2019 All illustrations and images included in CareNotes are the copyrighted property of [employer] or [employer]

## 2019-05-22 NOTE — ED ADULT TRIAGE NOTE - CHIEF COMPLAINT QUOTE
Pt brought in by ambulance for seizure at home. Pt brought in by ambulance for seizure at home. Seizure occurred "while I was sleeping."

## 2019-05-22 NOTE — ED PROVIDER NOTE - CLINICAL SUMMARY MEDICAL DECISION MAKING FREE TEXT BOX
Lab Facility: 1 Electrodesiccation Text: The wound bed was treated with electrodesiccation after the biopsy was performed. Size Of Lesion In Cm: 0.7 Silver Nitrate Text: The wound bed was treated with silver nitrate after the biopsy was performed. Render Post-Care Instructions In Note?: no Anesthesia Volume In Cc (Will Not Render If 0): 0.5 X Size Of Lesion In Cm: 0 Electrodesiccation And Curettage Text: The wound bed was treated with electrodesiccation Was A Bandage Applied: Yes Notification Instructions: Patient will be notified of biopsy results. However, patient instructed to call the office if not contacted within 2 weeks. Consent: Written consent was obtained and risks were reviewed including but not limited to scarring, infection, bleeding, scabbing, incomplete removal, nerve damage and allergy to anesthesia. Biopsy Type: H and E Billing Type: Third-Party Bill Anesthesia Type: 1% lidocaine with epinephrine Detail Level: Detailed Type Of Destruction Used: Electrodesiccation and Curettage Lab: 3 Wound Care: Polysporin ointment Hemostasis: Malia's Post-Care Instructions: I reviewed with the patient in detail post-care instructions. Patient is to keep the biopsy site dry overnight, and then apply bacitracin twice daily until healed. Patient may apply hydrogen peroxide soaks to remove any crusting. Curettage Text: The wound bed was treated with curettage after the biopsy was performed. Cryotherapy Text: The wound bed was treated with cryotherapy after the biopsy was performed. Biopsy Method: Personna blade Dressing: bandage Patient presents with seizure no fevers he is at baseline, we obtained labs cxr which are negative.  I will discharge with follow up to neuro

## 2019-05-22 NOTE — ED PROVIDER NOTE - ATTENDING CONTRIBUTION TO CARE
I was present for and supervised the key and critical aspects of the procedures performed during the care of the patient. patient has a history, seizure d/o, he has no fevers or chills he has a basline neuro exam asymptomatic at this time.    on physical exam the patient is nc/at perrla eomi oropharynx clear cta b/l, rrr s1s2 noted abd-soft nt nd bs+ ext from he has no neuro deficits   A/P- we obtained labs, cxr, iv fluids, iv keppra I will continue to monitor

## 2019-05-22 NOTE — ED PROVIDER NOTE - OBJECTIVE STATEMENT
27 yo M hx of seizures on Keppra here for seizure this morning  while he was sleeping. Patient states his mom called EMS. Patient states he has been taking his meds as prescribed. Last seizure 1 month ago. No headache, dizzy, CP, SOB, or abdominal pains.

## 2019-05-22 NOTE — ED ADULT NURSE REASSESSMENT NOTE - NS ED NURSE REASSESS COMMENT FT1
Pt has been closely monitored since arrival. No further seizure activity noted. Pt medicated with Keppra 1000 mg IV as ordered. Pt to be discharged home and will follow up as instructed.

## 2019-05-23 ENCOUNTER — INPATIENT (INPATIENT)
Facility: HOSPITAL | Age: 29
LOS: 0 days | Discharge: LEFT BEFORE DISCHARGE | End: 2019-05-23
Attending: INTERNAL MEDICINE | Admitting: INTERNAL MEDICINE
Payer: MEDICAID

## 2019-05-23 VITALS
OXYGEN SATURATION: 98 % | HEART RATE: 59 BPM | RESPIRATION RATE: 18 BRPM | DIASTOLIC BLOOD PRESSURE: 70 MMHG | SYSTOLIC BLOOD PRESSURE: 124 MMHG | WEIGHT: 179.9 LBS

## 2019-05-23 VITALS
DIASTOLIC BLOOD PRESSURE: 72 MMHG | RESPIRATION RATE: 18 BRPM | HEART RATE: 82 BPM | TEMPERATURE: 98 F | SYSTOLIC BLOOD PRESSURE: 130 MMHG

## 2019-05-23 DIAGNOSIS — R56.9 UNSPECIFIED CONVULSIONS: ICD-10-CM

## 2019-05-23 DIAGNOSIS — N44.00 TORSION OF TESTIS, UNSPECIFIED: Chronic | ICD-10-CM

## 2019-05-23 LAB
ALBUMIN SERPL ELPH-MCNC: 4 G/DL — SIGNIFICANT CHANGE UP (ref 3.5–5.2)
ALP SERPL-CCNC: 68 U/L — SIGNIFICANT CHANGE UP (ref 30–115)
ALT FLD-CCNC: 26 U/L — SIGNIFICANT CHANGE UP (ref 0–41)
AMPHET UR-MCNC: NEGATIVE — SIGNIFICANT CHANGE UP
ANION GAP SERPL CALC-SCNC: 11 MMOL/L — SIGNIFICANT CHANGE UP (ref 7–14)
AST SERPL-CCNC: 21 U/L — SIGNIFICANT CHANGE UP (ref 0–41)
BARBITURATES UR SCN-MCNC: NEGATIVE — SIGNIFICANT CHANGE UP
BASOPHILS # BLD AUTO: 0.06 K/UL — SIGNIFICANT CHANGE UP (ref 0–0.2)
BASOPHILS NFR BLD AUTO: 0.5 % — SIGNIFICANT CHANGE UP (ref 0–1)
BENZODIAZ UR-MCNC: NEGATIVE — SIGNIFICANT CHANGE UP
BILIRUB DIRECT SERPL-MCNC: <0.2 MG/DL — SIGNIFICANT CHANGE UP (ref 0–0.2)
BILIRUB INDIRECT FLD-MCNC: >0.1 MG/DL — LOW (ref 0.2–1.2)
BILIRUB SERPL-MCNC: 0.3 MG/DL — SIGNIFICANT CHANGE UP (ref 0.2–1.2)
BUN SERPL-MCNC: 18 MG/DL — SIGNIFICANT CHANGE UP (ref 10–20)
CALCIUM SERPL-MCNC: 8.7 MG/DL — SIGNIFICANT CHANGE UP (ref 8.5–10.1)
CHLORIDE SERPL-SCNC: 100 MMOL/L — SIGNIFICANT CHANGE UP (ref 98–110)
CO2 SERPL-SCNC: 28 MMOL/L — SIGNIFICANT CHANGE UP (ref 17–32)
COCAINE METAB.OTHER UR-MCNC: NEGATIVE — SIGNIFICANT CHANGE UP
CREAT SERPL-MCNC: 1 MG/DL — SIGNIFICANT CHANGE UP (ref 0.7–1.5)
DRUG SCREEN 1, URINE RESULT: SIGNIFICANT CHANGE UP
EOSINOPHIL # BLD AUTO: 0.08 K/UL — SIGNIFICANT CHANGE UP (ref 0–0.7)
EOSINOPHIL NFR BLD AUTO: 0.6 % — SIGNIFICANT CHANGE UP (ref 0–8)
GLUCOSE BLDC GLUCOMTR-MCNC: 92 MG/DL — SIGNIFICANT CHANGE UP (ref 70–99)
GLUCOSE SERPL-MCNC: 132 MG/DL — HIGH (ref 70–99)
HCT VFR BLD CALC: 38.4 % — LOW (ref 42–52)
HGB BLD-MCNC: 12.8 G/DL — LOW (ref 14–18)
IMM GRANULOCYTES NFR BLD AUTO: 0.3 % — SIGNIFICANT CHANGE UP (ref 0.1–0.3)
LYMPHOCYTES # BLD AUTO: 1.93 K/UL — SIGNIFICANT CHANGE UP (ref 1.2–3.4)
LYMPHOCYTES # BLD AUTO: 15.4 % — LOW (ref 20.5–51.1)
MAGNESIUM SERPL-MCNC: 2.2 MG/DL — SIGNIFICANT CHANGE UP (ref 1.8–2.4)
MCHC RBC-ENTMCNC: 29.2 PG — SIGNIFICANT CHANGE UP (ref 27–31)
MCHC RBC-ENTMCNC: 33.3 G/DL — SIGNIFICANT CHANGE UP (ref 32–37)
MCV RBC AUTO: 87.7 FL — SIGNIFICANT CHANGE UP (ref 80–94)
METHADONE UR-MCNC: NEGATIVE — SIGNIFICANT CHANGE UP
MONOCYTES # BLD AUTO: 1.09 K/UL — HIGH (ref 0.1–0.6)
MONOCYTES NFR BLD AUTO: 8.7 % — SIGNIFICANT CHANGE UP (ref 1.7–9.3)
NEUTROPHILS # BLD AUTO: 9.34 K/UL — HIGH (ref 1.4–6.5)
NEUTROPHILS NFR BLD AUTO: 74.5 % — SIGNIFICANT CHANGE UP (ref 42.2–75.2)
NRBC # BLD: 0 /100 WBCS — SIGNIFICANT CHANGE UP (ref 0–0)
OPIATES UR-MCNC: NEGATIVE — SIGNIFICANT CHANGE UP
PCP UR-MCNC: NEGATIVE — SIGNIFICANT CHANGE UP
PLATELET # BLD AUTO: 168 K/UL — SIGNIFICANT CHANGE UP (ref 130–400)
POTASSIUM SERPL-MCNC: 3.9 MMOL/L — SIGNIFICANT CHANGE UP (ref 3.5–5)
POTASSIUM SERPL-SCNC: 3.9 MMOL/L — SIGNIFICANT CHANGE UP (ref 3.5–5)
PROPOXYPHENE QUALITATIVE URINE RESULT: NEGATIVE — SIGNIFICANT CHANGE UP
PROT SERPL-MCNC: 6.7 G/DL — SIGNIFICANT CHANGE UP (ref 6–8)
RBC # BLD: 4.38 M/UL — LOW (ref 4.7–6.1)
RBC # FLD: 13.1 % — SIGNIFICANT CHANGE UP (ref 11.5–14.5)
SODIUM SERPL-SCNC: 139 MMOL/L — SIGNIFICANT CHANGE UP (ref 135–146)
THC UR QL: POSITIVE
WBC # BLD: 12.54 K/UL — HIGH (ref 4.8–10.8)
WBC # FLD AUTO: 12.54 K/UL — HIGH (ref 4.8–10.8)

## 2019-05-23 PROCEDURE — 99285 EMERGENCY DEPT VISIT HI MDM: CPT | Mod: 25

## 2019-05-23 RX ORDER — HEPARIN SODIUM 5000 [USP'U]/ML
5000 INJECTION INTRAVENOUS; SUBCUTANEOUS EVERY 12 HOURS
Refills: 0 | Status: DISCONTINUED | OUTPATIENT
Start: 2019-05-23 | End: 2019-05-23

## 2019-05-23 RX ORDER — LEVETIRACETAM 250 MG/1
2000 TABLET, FILM COATED ORAL ONCE
Refills: 0 | Status: COMPLETED | OUTPATIENT
Start: 2019-05-23 | End: 2019-05-23

## 2019-05-23 RX ORDER — LEVETIRACETAM 250 MG/1
2000 TABLET, FILM COATED ORAL
Refills: 0 | Status: DISCONTINUED | OUTPATIENT
Start: 2019-05-23 | End: 2019-05-23

## 2019-05-23 RX ORDER — LEVETIRACETAM 250 MG/1
1000 TABLET, FILM COATED ORAL
Refills: 0 | Status: DISCONTINUED | OUTPATIENT
Start: 2019-05-23 | End: 2019-05-23

## 2019-05-23 RX ORDER — SODIUM CHLORIDE 9 MG/ML
1000 INJECTION, SOLUTION INTRAVENOUS ONCE
Refills: 0 | Status: COMPLETED | OUTPATIENT
Start: 2019-05-23 | End: 2019-05-23

## 2019-05-23 RX ORDER — THIAMINE MONONITRATE (VIT B1) 100 MG
100 TABLET ORAL DAILY
Refills: 0 | Status: DISCONTINUED | OUTPATIENT
Start: 2019-05-23 | End: 2019-05-23

## 2019-05-23 RX ORDER — LEVETIRACETAM 250 MG/1
500 TABLET, FILM COATED ORAL ONCE
Refills: 0 | Status: DISCONTINUED | OUTPATIENT
Start: 2019-05-23 | End: 2019-05-23

## 2019-05-23 RX ADMIN — SODIUM CHLORIDE 1000 MILLILITER(S): 9 INJECTION, SOLUTION INTRAVENOUS at 01:16

## 2019-05-23 RX ADMIN — HEPARIN SODIUM 5000 UNIT(S): 5000 INJECTION INTRAVENOUS; SUBCUTANEOUS at 10:33

## 2019-05-23 RX ADMIN — Medication 2 MILLIGRAM(S): at 10:31

## 2019-05-23 RX ADMIN — LEVETIRACETAM 500 MILLIGRAM(S): 250 TABLET, FILM COATED ORAL at 10:32

## 2019-05-23 RX ADMIN — Medication 2 MILLIGRAM(S): at 02:00

## 2019-05-23 NOTE — CHART NOTE - NSCHARTNOTEFT_GEN_A_CORE
Neurology/Epilepsy NP:    Patient was re-evaluated by me at this time.   Patient is alert and awake, oriented to self, place, month, year, follows directions.  Patient continues refusing VEEG. I asked if patient would agree to REEG instead that will take about 1 hour to complete, still refused.  Patient does not want me to get in touch with his mother, but states she is on her way to the hospital to pick him up.    Plan:  - If patient leaves AMA, f/up with primary neurologist Dr. Martell tomorrow (spoke with the office earlier today)  - Continue maintenance dose of Keppra 2000mg q12hrs  - Give additional dose of Keppra 500mg x 1 now (ordered)  - Please, call epilepsy service for any change in patient condition or questions    Discussed with nursing staff and Dr. Clancy.

## 2019-05-23 NOTE — ED PROVIDER NOTE - OBJECTIVE STATEMENT
28 year old male past medical history of seizures is on keppra 200mg twice a day. patient states he is also prescribed medical marijuana for it. patient states over the last few days has had several seizures and was seen here in ER for them and was admitted and left AMA before Video EEG. patient returns today after having seizure on bus. patient denies chest pain, shortness of breath, abd pain, headache, dizziness, weakness, tongue bitting, and urinary/fecal incontinence. patient denies missing dose of meds and denies caffine or alcohol use.

## 2019-05-23 NOTE — H&P ADULT - NSHPLABSRESULTS_GEN_ALL_CORE
12.8   12.54 )-----------( 168      ( 23 May 2019 01:10 )             38.4     05-23    139  |  100  |  18  ----------------------------<  132<H>  3.9   |  28  |  1.0    Ca    8.7      23 May 2019 01:10  Mg     2.2     05-23    TPro  6.7  /  Alb  4.0  /  TBili  0.3  /  DBili  <0.2  /  AST  21  /  ALT  26  /  AlkPhos  68  05-23              Lactate Trend        CAPILLARY BLOOD GLUCOSE      POCT Blood Glucose.: 92 mg/dL (22 May 2019 07:23)

## 2019-05-23 NOTE — CHART NOTE - NSCHARTNOTEFT_GEN_A_CORE
Patient wanted to  leave against medical advice . Discussed on bedside in detail his need fro VEEG or REEG but he refused . He was orientedx3, not sedated at time of AMA. His mother was on bedside but he did not listen to her though tried to convince him to stay. Pt left  against medical advice . He was thoroughly explained the follow up need as outpatient and need for compliance with Keppra to which he agreed.

## 2019-05-23 NOTE — ED ADULT NURSE REASSESSMENT NOTE - NS ED NURSE REASSESS COMMENT FT1
pt noted by pca having a seizure in bed. md modi at bedside. as per md, pt given 2 mg ativan IV by NEHEMIAH sellers. pt placed on cardiac monitor. vital signs stable. pt remains In bed, side rails up. will continue to monitor.

## 2019-05-23 NOTE — H&P ADULT - ASSESSMENT
Patient is a 28y old  Male who presents with a chief complaint of    seizures                                                                                                                                                                                                                                                                                    HEALTH ISSUES - PROBLEM Dx:  Seizures

## 2019-05-23 NOTE — H&P ADULT - HISTORY OF PRESENT ILLNESS
Pt   admitted after having had seizures [ witnessed]  Fever, head trauma----not associated. Pt had witnessed seizures in the  er and  was given  sedatives

## 2019-05-23 NOTE — ED ADULT TRIAGE NOTE - CHIEF COMPLAINT QUOTE
biba, states patient had seizure this morning and another one 30 minutes prior to arrival.  hx of seizures, on keppra and as per mom, compliant with medication.  As per ems seizure was witnessed by mom.  Mother denied trauma to ems.

## 2019-05-23 NOTE — CHART NOTE - NSCHARTNOTEFT_GEN_A_CORE
Rapid response was called for observed epileptic seizure. on arrival seizure had subsided.. Vitals: heart rate 84 , respiratory rate 16, blood pressure 122.70, saturation 93%. Patient was given stat dose of ativan 2 milligrams IVP and Keppra IV 2gm stat. Neurology consulted . Plan for transfer to epilepsy center for VEEG.  Critical care time spent: 32 mins

## 2019-05-23 NOTE — CONSULT NOTE ADULT - SUBJECTIVE AND OBJECTIVE BOX
Neurology/Epilepsy Consult:    REYES CHARLINE 28y Male  MRN-[id number]    Patient is a 28y old  Male who presents with a chief complaint of seizures (23 May 2019 04:55)      HPI:  Pt   admitted after having had seizures [ witnessed]  Fever, head trauma----not associated. Pt had witnessed seizures in the  er and  was given  sedatives (23 May 2019 04:55)        PAST MEDICAL & SURGICAL HISTORY:  Seizures  Left testicular torsion        FAMILY HISTORY:  Family history of seizures (Child): Maternal Aunt        Social History: (-) x 3      Risk factors:  Born full-term, , no complications  Normal growth and development  No febrile seizures/CNS infections  No head trauma with loss of consciousness      Allergy:  No Known Allergies        Home Medications:  Keppra 1000 mg oral tablet: two 1,000 mg tabs twice a day (22 May 2019 07:18)        MEDICATIONS  (STANDING):  heparin  Injectable 5000 Unit(s) SubCutaneous every 12 hours  levETIRAcetam 2000 milliGRAM(s) Oral two times a day  levETIRAcetam  IVPB 2000 milliGRAM(s) IV Intermittent once  thiamine 100 milliGRAM(s) Oral daily    MEDICATIONS  (PRN):  LORazepam   Injectable 2 milliGRAM(s) IV Push every 4 hours PRN seizures            T(F): 97.6 (19 @ 06:17), Max: 99.3 (19 @ 01:22)  HR: 81 (19 @ 06:17) (59 - 101)  BP: 102/57 (19 @ 06:17) (99/57 - 136/59)  RR: 18 (19 @ 06:17) (18 - 18)  SpO2: 98% (19 @ 03:09) (98% - 98%)      Neurologic Examination:  General:  Appearance is consistent with chronologic age.  No abnormal facies.   General: The patient is oriented to person, place, time and date.  Recent and remote memory intact.  Follows 4-step directions. Fund of knowledge is intact and normal.  Language with normal repetition, comprehension and naming.  Nondysarthric.    Cranial nerves: EOMI w/o nystagmus, skew or reported double vision.  PERRL.  No ptosis/weakness of eyelid closure.  Facial sensation is normal with normal bite.  No facial asymmetry.  Hearing grossly intact b/l.  Palate elevates midline.  Tongue midline.  Motor examination:   Normal tone, bulk and range of motion.  No tenderness, twitching, tremors or involuntary movements.  Formal Muscle Strength Testin/5 UE; 5/5 LE.  No observable drift.  Reflexes:   2+ b/l pectoralis, biceps, triceps, brachioradialis, patella and Achilles.  Plantar response downgoing b/l.  Jaw jerk, Basim, clonus absent.  Sensory examination:   Intact to light touch and pinprick, pain, temperature and proprioception and vibration in all extremities.  Cerebellum:   FTN/HKS intact with normal ELDON in all limbs.  No dysmetria or dysdiadokinesia.  Gait narrow based and normal.      Labs:                         12.8   12.54 )-----------( 168      ( 23 May 2019 01:10 )             38.4         139  |  100  |  18  ----------------------------<  132<H>  3.9   |  28  |  1.0    Ca    8.7      23 May 2019 01:10  Mg     2.2         TPro  6.7  /  Alb  4.0  /  TBili  0.3  /  DBili  <0.2  /  AST  21  /  ALT  26  /  AlkPhos  68      LIVER FUNCTIONS - ( 23 May 2019 01:10 )  Alb: 4.0 g/dL / Pro: 6.7 g/dL / ALK PHOS: 68 U/L / ALT: 26 U/L / AST: 21 U/L / GGT: x               Levetiracetam Level, Serum: 30.6: -------------------ADDITIONAL INFORMATION-------------------  [address] [place] [address] mcg/mL (19 @ 06:33)    Levetiracetam Level, Serum: 51.5: -------------------ADDITIONAL INFORMATION-------------------  [address] [place] [address] mcg/mL (19 @ 11:20)          Neuroimaging:  NCHCT:   CT Angiography/Perfusion:   MRI Brain NC:   MRA Head/Neck:       REEG  < from: EEG (19 @ 14:00) >  FOCAL SLOWING  mild to moderate right frontotemporal focal slowing      AREA OF FOCAL SLOWING  As above      BREACH ARTIFACT  No breach artifact      Hyper Ventilation  Was not performed        Photic Stimulation  Was not performed      EPILEPTIFORM ACTIVITY  Large number of low amplitude right frontotemporal sharps      TYPE  As above  As above      EVENTS  No events reported or recorded      IMPRESSIONS  Abnormal routine EEG due to the presence of  -Right hemispheric focal slowing as described above  -Right frontotemporal sharps as described above    < end of copied text >        Assessment:  This is a 28y Male with h/o       Discussed with Dr. Duong      Plan:   - VEEG for better characterization and assessment  - Seizure precautions  - Ativan 2mg IV PRN for generalized tonic-clonic seizure lasting longer than 2 minutes, or two consecutive seizures without return to baseline in-between (ordered)  - CBC, CMP, Mg, AED levels trough (ordered)  - Keep Mg above 2    Plan discussed with patient in details, all questions answered Neurology/Epilepsy Consult:    CHARLINE CHAMBERS 28y Male  MRN-[id number]    Patient is a 28y old  Male who presents with a chief complaint of seizures (23 May 2019 04:55)      HPI: History obtained from patient, EMR reviewed. Contacted primary neuro Dr. Edwards [phone] and [facility] pharmacy.  It must be noted that patient is not the greatest historian.  Patient came to ED yesterday morning for breakthrough seizure, given Keppra 1gm IV and was discharged in a few hours. Patient returned to the ED around midnight last night after another seizure. He had a witnessed seizure while in the ED lasting 30 seconds (no description available), medicated with Ativan 2mg and approved for epilepsy monitoring unit.    This morning while in [facility] rapid response was called around 7:30 am for an episode of confusion lasting about 1 minutes, no additional medications given. As per RN, RR was cancelled since patient returned to baseline almost immediately.    I evaluated patient at 8:45 am and he was at baseline, refused VEEG monitoring and stated he was leaving the hospital AMA. He was planning to call his neurologist and schedule follow up. Pt states he was diagnosed with seizure disorder in  and is being followed by neurologist Dr. Martell in [place].. As per the pt takes Keppra 2000 mg po q12hrs that was increased in 2019.  He states that sometimes he gets an aura and inhales CBP vape that helps him with seizure control.  Patient reported to be compliant with medications, denied any missed doses.    At 10:30 am the second rapid response was called when patient became confused and agitated, followed by 30 seconds of unresponsiveness and rigidity. Patient was given Ativan 2mg IV, as well as Keppra 2000mg stat dose for this morning.    Patient had multiple prior visits to [facility] for breakthrough seizures, within the last 12 months had VEEG mnitoring twice as well as REEG. Most recent admission was 19 when patient left AMA (it must be noted that patient left AMA several times prior to that as well).    I called Dr. Martell office for additional information. Patient was last seen by Dr. Martell 19, but was a no show for follow up appointment 19. He does not have a scheduled follow up at this time, but they can accomodate patient if needed to be seen tomorrow.          PAST MEDICAL & SURGICAL HISTORY:  Right temporal lobe epilepsy  Left testicular torsion        FAMILY HISTORY:  Family history of seizures (Child): Maternal Aunt        Social History:   + marijuana        Allergy:  No Known Allergies        Home Medications (confirmed with [facility]):  Keppra 2000 mg q 12hrs        MEDICATIONS  (STANDING):  heparin  Injectable 5000 Unit(s) SubCutaneous every 12 hours  levETIRAcetam 2000 milliGRAM(s) Oral two times a day  levETIRAcetam  IVPB 2000 milliGRAM(s) IV Intermittent once  thiamine 100 milliGRAM(s) Oral daily    MEDICATIONS  (PRN):  LORazepam   Injectable 2 milliGRAM(s) IV Push every 4 hours PRN seizures            T(F): 97.6 (19 @ 06:17), Max: 99.3 (19 @ 01:22)  HR: 81 (19 @ 06:17) (59 - 101)  BP: 102/57 (19 @ 06:17) (99/57 - 136/59)  RR: 18 (19 @ 06:17) (18 - 18)  SpO2: 98% (19 @ 03:09) (98% - 98%)      Neurologic Examination at 8:45 am:  General:  Appearance is consistent with chronologic age.  No abnormal facies.   General: The patient is oriented to person, place, time and date.  Recent and remote memory intact.  Follows 4-step directions. Fund of knowledge is intact and normal.  Language with normal repetition, comprehension and naming.  Nondysarthric.    Cranial nerves: EOMI w/o nystagmus, skew or reported double vision.  PERRL.  No ptosis/weakness of eyelid closure.  Facial sensation is normal with normal bite.  No facial asymmetry.  Hearing grossly intact b/l.  Palate elevates midline.  Tongue midline.  Motor examination:   Normal tone, bulk and range of motion.  No tenderness, twitching, tremors or involuntary movements.  Formal Muscle Strength Testin/5 UE; 5/5 LE.  No observable drift.  Reflexes:   2+ b/l   Sensory examination:   Intact to light touch and pinprick, pain  Cerebellum:   FTN/HKS intact with normal ELDON in all limbs.  No dysmetria or dysdiadokinesia.  Gait narrow based and normal.    Neurologic Examination at 11:00 am:  Patient is asleep. VSS, O2 sat 99%      Labs:                         12.8   12.54 )-----------( 168      ( 23 May 2019 01:10 )             38.4         139  |  100  |  18  ----------------------------<  132<H>  3.9   |  28  |  1.0    Ca    8.7      23 May 2019 01:10  Mg     2.2         TPro  6.7  /  Alb  4.0  /  TBili  0.3  /  DBili  <0.2  /  AST  21  /  ALT  26  /  AlkPhos  68      LIVER FUNCTIONS - ( 23 May 2019 01:10 )  Alb: 4.0 g/dL / Pro: 6.7 g/dL / ALK PHOS: 68 U/L / ALT: 26 U/L / AST: 21 U/L / GGT: x               Levetiracetam Level, Serum: 30.6: -------------------ADDITIONAL INFORMATION-------------------  [address] [place] [address] mcg/mL (19 @ 06:33)    Levetiracetam Level, Serum: 51.5: -------------------ADDITIONAL INFORMATION-------------------  [address] [place] [address] mcg/mL (19 @ 11:20)          Neuroimaging:  NCHCT:   < from: CT Head No Cont (19 @ 23:36) >  IMPRESSION:      No evidence of acute intracranial pathology. Unchanged exam.      < end of copied text >        REEG  < from: EEG (19 @ 14:00) >  FOCAL SLOWING  mild to moderate right frontotemporal focal slowing      AREA OF FOCAL SLOWING  As above      BREACH ARTIFACT  No breach artifact      Hyper Ventilation  Was not performed        Photic Stimulation  Was not performed      EPILEPTIFORM ACTIVITY  Large number of low amplitude right frontotemporal sharps      TYPE  As above  As above      EVENTS  No events reported or recorded      IMPRESSIONS  Abnormal routine EEG due to the presence of  -Right hemispheric focal slowing as described above  -Right frontotemporal sharps as described above    < end of copied text >        VEEG:  < from: EEG Monitoring Each 24 hours (19 @ 08:20) >  Impression  This is an  abnormal Video EEG study with right temporal focal slowing   and no epileptiform discharges. None of the patient's typical events were   captured precluding direct electro-clinical correlation.    < end of copied text >          VEEG:  < from: EEG Monitoring Each 24 hours (18 @ 14:22) >  FOCAL SLOWING  Borderline to mild right hemispheric focal slowing mainly over mid to   posterior temporal region      BREACH ARTIFACT  No breach artifact    HV/PS  Not performed     Sleep Deprivation and medication taper  Was not performed     EPILEPTIFORM ACTIVITY  Large number  right anterior temporal sharps and is spikes with phase   reversal at T2 and T4 contact points    EVENTS  During this session of the monitoring patient had 2 partial seizures from   the right hemisphere-right frontotemporal region each episode lasted for   3 to 4 minutes clinically both very characterized by behavioral arrest   followed by Jaskaran and oral automatism electrographically the seizures   presented as rhythmic low theta and data activity from the right   frontotemporal region    IMPRESSIONS  Abnormal video EEG due to the presence of  1-focal slowing as described above   2-abnormal epileptiform activity as described above  3-two Partial seizures from the right hemisphere as described above    CLINICAL CORRELATION  Consistent feet right hemispheric focal electrophysiological dysfunction   and right hemispheric partial epilepsy clinical correlation is   recommended     < end of copied text >          Assessment:  This is a 28y Male with h/o       Discussed with Dr. Duong      Plan:   - Patient refused REEG or VEEG  - Seizure precautions  - Continue Keppra 2000mg q12hrs (home dose)  - Ativan 2mg IV PRN for generalized tonic-clonic seizure lasting longer than 2 minutes, or two consecutive seizures without return to baseline in-between (ordered)  - CBC, CMP, Mg, AED level trough   - Keep Mg above 2    Plan discussed with hospitalist Dr. Clancy. Neurology/Epilepsy Consult:    CHARLINE CHAMBERS 28y Male  MRN-[id number]    Patient is a 28y old  Male who presents with a chief complaint of seizures (23 May 2019 04:55)      HPI: History obtained from patient, EMR reviewed. Contacted primary neuro Dr. Edwards [phone] and [facility] pharmacy.  It must be noted that patient is not the greatest historian.  Patient came to ED yesterday morning for breakthrough seizure, given Keppra 1gm IV and was discharged in a few hours. Patient returned to the ED around midnight last night after another seizure. He had a witnessed seizure while in the ED lasting 30 seconds (no description available), medicated with Ativan 2mg and approved for epilepsy monitoring unit.    This morning while in [facility] rapid response was called around 7:30 am for an episode of confusion lasting about 1 minutes, no additional medications given. As per RN, RR was cancelled since patient returned to baseline almost immediately.    I evaluated patient at 8:45 am and he was at baseline, refused VEEG monitoring and stated he was leaving the hospital AMA. He was planning to call his neurologist and schedule follow up. Pt states he was diagnosed with seizure disorder in  and is being followed by neurologist Dr. Martell in [place].. As per the pt takes Keppra 2000 mg po q12hrs that was increased in 2019.  He states that sometimes he gets an aura and inhales CBP vape that helps him with seizure control.  Patient reported to be compliant with medications, denied any missed doses.    At 10:30 am the second rapid response was called when patient became confused and agitated, followed by 30 seconds of unresponsiveness and rigidity. Patient was given Ativan 2mg IV, as well as Keppra 2000mg stat dose for this morning.    Patient had multiple prior visits to [facility] for breakthrough seizures, within the last 12 months had VEEG mnitoring twice as well as REEG. Most recent admission was 19 when patient left AMA (it must be noted that patient left AMA several times prior to that as well).    I called Dr. Martell office for additional information. Patient was last seen by Dr. Martell 19, but was a no show for follow up appointment 19. He does not have a scheduled follow up at this time, but they can accomodate patient if needed to be seen tomorrow.          PAST MEDICAL & SURGICAL HISTORY:  Right temporal lobe epilepsy  Left testicular torsion        FAMILY HISTORY:  Family history of seizures (Child): Maternal Aunt        Social History:   + marijuana        Allergy:  No Known Allergies        Home Medications (confirmed with [facility]):  Keppra 2000 mg q 12hrs        MEDICATIONS  (STANDING):  heparin  Injectable 5000 Unit(s) SubCutaneous every 12 hours  levETIRAcetam 2000 milliGRAM(s) Oral two times a day  levETIRAcetam  IVPB 2000 milliGRAM(s) IV Intermittent once  thiamine 100 milliGRAM(s) Oral daily    MEDICATIONS  (PRN):  LORazepam   Injectable 2 milliGRAM(s) IV Push every 4 hours PRN seizures            T(F): 97.6 (19 @ 06:17), Max: 99.3 (19 @ 01:22)  HR: 81 (19 @ 06:17) (59 - 101)  BP: 102/57 (19 @ 06:17) (99/57 - 136/59)  RR: 18 (19 @ 06:17) (18 - 18)  SpO2: 98% (19 @ 03:09) (98% - 98%)      Neurologic Examination at 8:45 am:  General:  Appearance is consistent with chronologic age.  No abnormal facies.   General: The patient is oriented to person, place, time and date.  Recent and remote memory intact.  Follows 4-step directions. Fund of knowledge is intact and normal.  Language with normal repetition, comprehension and naming.  Nondysarthric.    Cranial nerves: EOMI w/o nystagmus, skew or reported double vision.  PERRL.  No ptosis/weakness of eyelid closure.  Facial sensation is normal with normal bite.  No facial asymmetry.  Hearing grossly intact b/l.  Palate elevates midline.  Tongue midline.  Motor examination:   Normal tone, bulk and range of motion.  No tenderness, twitching, tremors or involuntary movements.  Formal Muscle Strength Testin/5 UE; 5/5 LE.  No observable drift.  Reflexes:   2+ b/l   Sensory examination:   Intact to light touch and pinprick, pain  Cerebellum:   FTN/HKS intact with normal ELDON in all limbs.  No dysmetria or dysdiadokinesia.  Gait narrow based and normal.    Neurologic Examination at 11:00 am:  Patient is asleep. VSS, O2 sat 99%      Labs:                         12.8   12.54 )-----------( 168      ( 23 May 2019 01:10 )             38.4         139  |  100  |  18  ----------------------------<  132<H>  3.9   |  28  |  1.0    Ca    8.7      23 May 2019 01:10  Mg     2.2         TPro  6.7  /  Alb  4.0  /  TBili  0.3  /  DBili  <0.2  /  AST  21  /  ALT  26  /  AlkPhos  68      LIVER FUNCTIONS - ( 23 May 2019 01:10 )  Alb: 4.0 g/dL / Pro: 6.7 g/dL / ALK PHOS: 68 U/L / ALT: 26 U/L / AST: 21 U/L / GGT: x               Levetiracetam Level, Serum: 30.6: -------------------ADDITIONAL INFORMATION-------------------  [address] [place] [address] mcg/mL (19 @ 06:33)    Levetiracetam Level, Serum: 51.5: -------------------ADDITIONAL INFORMATION-------------------  [address] [place] [address] mcg/mL (19 @ 11:20)          Neuroimaging:  NCHCT:   < from: CT Head No Cont (19 @ 23:36) >  IMPRESSION:      No evidence of acute intracranial pathology. Unchanged exam.      < end of copied text >        REEG  < from: EEG (19 @ 14:00) >  FOCAL SLOWING  mild to moderate right frontotemporal focal slowing      AREA OF FOCAL SLOWING  As above      BREACH ARTIFACT  No breach artifact      Hyper Ventilation  Was not performed        Photic Stimulation  Was not performed      EPILEPTIFORM ACTIVITY  Large number of low amplitude right frontotemporal sharps      TYPE  As above  As above      EVENTS  No events reported or recorded      IMPRESSIONS  Abnormal routine EEG due to the presence of  -Right hemispheric focal slowing as described above  -Right frontotemporal sharps as described above    < end of copied text >        VEEG:  < from: EEG Monitoring Each 24 hours (19 @ 08:20) >  Impression  This is an  abnormal Video EEG study with right temporal focal slowing   and no epileptiform discharges. None of the patient's typical events were   captured precluding direct electro-clinical correlation.    < end of copied text >          VEEG:  < from: EEG Monitoring Each 24 hours (18 @ 14:22) >  FOCAL SLOWING  Borderline to mild right hemispheric focal slowing mainly over mid to   posterior temporal region      BREACH ARTIFACT  No breach artifact    HV/PS  Not performed     Sleep Deprivation and medication taper  Was not performed     EPILEPTIFORM ACTIVITY  Large number  right anterior temporal sharps and is spikes with phase   reversal at T2 and T4 contact points    EVENTS  During this session of the monitoring patient had 2 partial seizures from   the right hemisphere-right frontotemporal region each episode lasted for   3 to 4 minutes clinically both very characterized by behavioral arrest   followed by Jaskaran and oral automatism electrographically the seizures   presented as rhythmic low theta and data activity from the right   frontotemporal region    IMPRESSIONS  Abnormal video EEG due to the presence of  1-focal slowing as described above   2-abnormal epileptiform activity as described above  3-two Partial seizures from the right hemisphere as described above    CLINICAL CORRELATION  Consistent feet right hemispheric focal electrophysiological dysfunction   and right hemispheric partial epilepsy clinical correlation is   recommended     < end of copied text >          Assessment:  This is a 28y Male with h/o right temporal lobe epilepsy, s/p multiple breakthrough seizures.       Discussed with Dr. Duong      Plan:   - Patient refused REEG or VEEG  - Seizure precautions  - Continue Keppra 2000mg q12hrs (home dose)  - Ativan 2mg IV PRN for generalized tonic-clonic seizure lasting longer than 2 minutes, or two consecutive seizures without return to baseline in-between (ordered)  - CBC, CMP, Mg, AED level trough   - Keep Mg above 2    Plan discussed with hospitalist Dr. Clancy.

## 2019-05-23 NOTE — ED PROVIDER NOTE - PROGRESS NOTE DETAILS
patient and witness tonic clonic seizure in emergency room. Given ativan 2mg IVP and seizures resolved. Lasted less than 30seconds. patient did no bit tongue or injure head. Will page neuro D/w Dr. Rehman accepts to EEG unit. D/w Dr. raine wolff

## 2019-05-23 NOTE — H&P ADULT - NSHPREVIEWOFSYSTEMS_GEN_ALL_CORE
REVIEW OF SYSTEMS:      CONSTITUTIONAL:         Fever---n/a  EYES/ENT:                No chronic inf  NECK:          nil  contributary  CARDIOVASCULAR:     cad/ htn     n/.a  GASTROINTESTINAL:     dm n/a  GENITOURINARY:      Nil known  NEUROLOGICAL:      H/o aeizures ++  SKIN:

## 2019-05-23 NOTE — ED PROVIDER NOTE - CLINICAL SUMMARY MEDICAL DECISION MAKING FREE TEXT BOX
pt with hx of seizures -   compliant with his meds - pw break through seizure - pt has been having mutlitple recently  -  last seen in ED  for seiure  earlier today -   whle in ED isaias  pt had  witnessed GTC seizure <1 min -  ativan 2mg given -    dw neuro- admitted to epilepsy unit

## 2019-05-23 NOTE — H&P ADULT - NSICDXFAMILYHX_GEN_ALL_CORE_FT
FAMILY HISTORY:  Child  Still living? Unknown  Family history of seizures, Age at diagnosis: Age Unknown

## 2019-05-24 LAB — LEVETIRACETAM SERPL-MCNC: 58.2 MCG/ML — HIGH (ref 12–46)

## 2019-05-25 ENCOUNTER — EMERGENCY (EMERGENCY)
Facility: HOSPITAL | Age: 29
LOS: 0 days | Discharge: HOME | End: 2019-05-25
Attending: EMERGENCY MEDICINE | Admitting: EMERGENCY MEDICINE
Payer: MEDICAID

## 2019-05-25 VITALS
DIASTOLIC BLOOD PRESSURE: 76 MMHG | RESPIRATION RATE: 18 BRPM | SYSTOLIC BLOOD PRESSURE: 128 MMHG | HEART RATE: 72 BPM | OXYGEN SATURATION: 97 % | TEMPERATURE: 98 F

## 2019-05-25 VITALS
RESPIRATION RATE: 18 BRPM | DIASTOLIC BLOOD PRESSURE: 73 MMHG | TEMPERATURE: 98 F | WEIGHT: 207.01 LBS | HEIGHT: 68 IN | OXYGEN SATURATION: 95 % | SYSTOLIC BLOOD PRESSURE: 133 MMHG | HEART RATE: 66 BPM

## 2019-05-25 DIAGNOSIS — Z79.899 OTHER LONG TERM (CURRENT) DRUG THERAPY: ICD-10-CM

## 2019-05-25 DIAGNOSIS — T42.6X5A ADVERSE EFFECT OF OTHER ANTIEPILEPTIC AND SEDATIVE-HYPNOTIC DRUGS, INITIAL ENCOUNTER: ICD-10-CM

## 2019-05-25 DIAGNOSIS — J39.2 OTHER DISEASES OF PHARYNX: ICD-10-CM

## 2019-05-25 DIAGNOSIS — G40.909 EPILEPSY, UNSPECIFIED, NOT INTRACTABLE, WITHOUT STATUS EPILEPTICUS: ICD-10-CM

## 2019-05-25 DIAGNOSIS — N44.00 TORSION OF TESTIS, UNSPECIFIED: Chronic | ICD-10-CM

## 2019-05-25 PROCEDURE — 99283 EMERGENCY DEPT VISIT LOW MDM: CPT | Mod: 25

## 2019-05-25 RX ORDER — DIPHENHYDRAMINE HCL 50 MG
50 CAPSULE ORAL ONCE
Refills: 0 | Status: COMPLETED | OUTPATIENT
Start: 2019-05-25 | End: 2019-05-25

## 2019-05-25 RX ORDER — FAMOTIDINE 10 MG/ML
20 INJECTION INTRAVENOUS ONCE
Refills: 0 | Status: COMPLETED | OUTPATIENT
Start: 2019-05-25 | End: 2019-05-25

## 2019-05-25 RX ADMIN — FAMOTIDINE 20 MILLIGRAM(S): 10 INJECTION INTRAVENOUS at 03:40

## 2019-05-25 RX ADMIN — Medication 60 MILLIGRAM(S): at 03:46

## 2019-05-25 RX ADMIN — Medication 50 MILLIGRAM(S): at 03:40

## 2019-05-25 NOTE — ED PROVIDER NOTE - CLINICAL SUMMARY MEDICAL DECISION MAKING FREE TEXT BOX
Patient observed here in the ED with no symptoms improved after treatment, he is no resp distress I will discharge at this time rx medrol dose pack we discussed indications to return as well as follow up to neurology for optimal control of medications

## 2019-05-25 NOTE — ED PROVIDER NOTE - OBJECTIVE STATEMENT
29 yo m pmhx sig for seizure disorder pw tingling in the back of throat and perioral paresthesias that occurred several hours PTA to the ED after pt took Leukotriene, no sob, no difficulty swallowing, no abd pian, no rash, no n/v, no loc.    I have reviewed available current nursing and previous documentation of past medical, surgical, family, and/or social history.

## 2019-05-25 NOTE — ED ADULT TRIAGE NOTE - CHIEF COMPLAINT QUOTE
as per pt "I took new medication today its lomotrigine 25 mg around 2145, took 3 pills since yesterday and had a bad reaction to it, my neck started swelling and I felt pressure on my neck, my mouth and my tongue feel dry"

## 2019-05-25 NOTE — ED PROVIDER NOTE - PHYSICAL EXAMINATION
Physical Exam    Vital Signs: I have reviewed the initial vital signs.  Constitutional: well-nourished, appears stated age, no acute distress  Eyes: PERRLA,  and symmetrical lids.  ENT: neck supple with no adenopathy, moist MM, airway intact  Cardiovascular: +S1/S2, no murmurs, regular rate, regular rhythm, well-perfused extremities  Respiratory: unlabored respiratory effort, clear to auscultation bilaterally, speaks in full sentences  Gastrointestinal: soft, non-tender abdomen, no guarding  Integumentary: no rash, no urticaria

## 2019-05-25 NOTE — ED PROVIDER NOTE - ATTENDING CONTRIBUTION TO CARE
I was present for and supervised the key and critical aspects of the procedures performed during the care of the patient. Patient presents as a 29 yo m pmhx sig for seizure disorder pw tingling in the back of throat and perioral paresthesias that occurred several hours PTA to the ED after pt took Leukotriene, no sob, no difficulty swallowing, no abd pian, no rash, no n/v, no loc. He is able to tolerate po speaking in full sentences. he denies any lip or tongue swelling he denies any shortness of breath or wheezing  On physical exam the patient is nc/at perrla eomi oropharynx clear cta b/l, rrr s1s2 noted abd-soft nd bs + ext from with no focal deficits   A/_ patient given po prednisone, benadryl and pepcid we he was improved with no resp symptoms I will continue to monitor at this time.

## 2019-05-25 NOTE — ED PROVIDER NOTE - NS ED ROS FT
Review of Systems    Constitutional: (-) fever (-) weakness (-) diaphoresis   Eyes: (-) change in vision (-) eye pain  ENT: (-) sore throat (-) ear ache (-) nasal discharge  Cardiovascular: (-) chest pain  (-) palpitations  Respiratory: (-) SOB (-) cough   GI: (-) abdominal pain (-) N/V (-) diarrhea  Integumentary: (-) rash (-) redness   Neurological:  (-) focal deficit (-) altered mental status

## 2019-05-31 LAB
CARBOXYTHC UR CFM-MCNC: 1422 NG/ML — SIGNIFICANT CHANGE UP
CARBOXYTHC UR QL SCN: 204.4 MG/DL — SIGNIFICANT CHANGE UP
CARBOXYTHC UR QL SCN: 696 — SIGNIFICANT CHANGE UP
THC CREATININE URINE: 204.4 MG/DL — SIGNIFICANT CHANGE UP
THC METABOLITE/CREAT URINE: 696 — SIGNIFICANT CHANGE UP

## 2019-06-01 DIAGNOSIS — F17.210 NICOTINE DEPENDENCE, CIGARETTES, UNCOMPLICATED: ICD-10-CM

## 2019-06-01 DIAGNOSIS — G40.909 EPILEPSY, UNSPECIFIED, NOT INTRACTABLE, WITHOUT STATUS EPILEPTICUS: ICD-10-CM

## 2019-06-01 DIAGNOSIS — Z91.19 PATIENT'S NONCOMPLIANCE WITH OTHER MEDICAL TREATMENT AND REGIMEN: ICD-10-CM

## 2019-06-01 DIAGNOSIS — G40.409 OTHER GENERALIZED EPILEPSY AND EPILEPTIC SYNDROMES, NOT INTRACTABLE, WITHOUT STATUS EPILEPTICUS: ICD-10-CM

## 2019-06-01 DIAGNOSIS — F12.10 CANNABIS ABUSE, UNCOMPLICATED: ICD-10-CM

## 2019-06-15 ENCOUNTER — EMERGENCY (EMERGENCY)
Facility: HOSPITAL | Age: 29
LOS: 0 days | Discharge: HOME | End: 2019-06-15
Attending: EMERGENCY MEDICINE | Admitting: EMERGENCY MEDICINE
Payer: MEDICAID

## 2019-06-15 VITALS
HEIGHT: 68 IN | HEART RATE: 60 BPM | TEMPERATURE: 98 F | SYSTOLIC BLOOD PRESSURE: 132 MMHG | DIASTOLIC BLOOD PRESSURE: 80 MMHG | WEIGHT: 205.91 LBS | RESPIRATION RATE: 18 BRPM | OXYGEN SATURATION: 100 %

## 2019-06-15 DIAGNOSIS — R07.89 OTHER CHEST PAIN: ICD-10-CM

## 2019-06-15 DIAGNOSIS — Z79.899 OTHER LONG TERM (CURRENT) DRUG THERAPY: ICD-10-CM

## 2019-06-15 DIAGNOSIS — G40.909 EPILEPSY, UNSPECIFIED, NOT INTRACTABLE, WITHOUT STATUS EPILEPTICUS: ICD-10-CM

## 2019-06-15 DIAGNOSIS — N44.00 TORSION OF TESTIS, UNSPECIFIED: Chronic | ICD-10-CM

## 2019-06-15 PROCEDURE — 99284 EMERGENCY DEPT VISIT MOD MDM: CPT | Mod: 25

## 2019-06-15 PROCEDURE — 71046 X-RAY EXAM CHEST 2 VIEWS: CPT | Mod: 26

## 2019-06-15 NOTE — ED PROVIDER NOTE - NS ED ROS FT
Review of Systems    Constitutional: (-) fever (-) weakness (-) diaphoresis   Cardiovascular: (-) palpitations  Respiratory: (-) SOB (-) cough   GI: (-) abdominal pain (-) N/V

## 2019-06-15 NOTE — ED PROVIDER NOTE - ATTENDING CONTRIBUTION TO CARE
27 y/o M PMH Seizures who presents with atypical chest pain.  No fever or signs of infectious etiology.  Patient has had numerous episodes of chest pain in the past when he changes his medication.  No history of DVT or PE.  Patient has a normal physical exam except for mild reproducible chest tenderness.  VS reviewed, EKG non-ischemic.  Clinically this is not a cardiac issue and patient had no prior CAD history or family history of CAD.  Denies drug use.    Full DC instructions discussed and patient knows when to seek immediate medical attention.  Patient has proper follow up.  All results discussed and patient aware they may require further work up.  Proper follow up ensured. Limitations of ED work up discussed.  Medications administered and prescribed/OTC home meds discussed.  All questions and concerns from patient or family addressed. Understanding of instructions verbalized. 27 y/o M PMH Seizures who presents with atypical chest pain.  No fever or signs of infectious etiology.  Patient has had numerous episodes of chest pain in the past when he changes his medication.  No history of DVT or PE.  Patient has a normal physical exam except for mild reproducible chest tenderness.  VS reviewed, EKG non-ischemic.  Clinically this is not a cardiac issue and patient had no prior CAD history or family history of CAD.  Denies drug use.    Full DC instructions discussed and patient knows when to seek immediate medical attention.  Patient has proper follow up.  All results discussed and patient aware they may require further work up.  Proper follow up ensured. Limitations of ED work up discussed.  Medications administered and prescribed/OTC home meds discussed.  All questions and concerns from patient or family addressed. Understanding of instructions verbalized..

## 2019-06-15 NOTE — ED PROVIDER NOTE - CLINICAL SUMMARY MEDICAL DECISION MAKING FREE TEXT BOX
27 y/o M PMH Seizures who presents with atypical chest pain.  No fever or signs of infectious etiology.  Patient has had numerous episodes of chest pain in the past when he changes his medication.  No history of DVT or PE.  Patient has a normal physical exam except for mild reproducible chest tenderness.  VS reviewed, EKG non-ischemic.  Clinically this is not a cardiac issue and patient had no prior CAD history or family history of CAD.  Denies drug use.    Full DC instructions discussed and patient knows when to seek immediate medical attention.  Patient has proper follow up.  All results discussed and patient aware they may require further work up.  Proper follow up ensured. Limitations of ED work up discussed.  Medications administered and prescribed/OTC home meds discussed.  All questions and concerns from patient or family addressed. Understanding of instructions verbalized.

## 2019-06-15 NOTE — ED PROVIDER NOTE - CARE PROVIDER_API CALL
Ghanshyam Epperson)  Cardiovascular Disease; Internal Medicine  [address] [place] [id number]  Phone: [phone]  Fax: [phone]  Follow Up Time:

## 2019-06-15 NOTE — ED PROVIDER NOTE - OBJECTIVE STATEMENT
29 yo m pmhx sig for seizures pw mild chest tightness quick onset midsternal non radiating with no provoking or modifying factors, not associated with SOB, n/v, or diaphoresis. The episode was brief and self resolving. No unilateral swelling, hemoptysis, estrogen supplementation, malignancy, recent immobilization or surgery, or prior DVT/PE.    I have reviewed available current nursing and previous documentation of past medical, surgical, family, and/or social history.

## 2019-06-15 NOTE — ED ADULT NURSE NOTE - NSIMPLEMENTINTERV_GEN_ALL_ED
Implemented All Universal Safety Interventions:  [place] to call system. Call bell, personal items and telephone within reach. Instruct patient to call for assistance. Room bathroom lighting operational. Non-slip footwear when patient is off stretcher. Physically safe environment: no spills, clutter or unnecessary equipment. Stretcher in lowest position, wheels locked, appropriate side rails in place.

## 2019-11-11 ENCOUNTER — EMERGENCY (EMERGENCY)
Facility: HOSPITAL | Age: 29
LOS: 0 days | Discharge: HOME | End: 2019-11-12
Attending: EMERGENCY MEDICINE | Admitting: EMERGENCY MEDICINE
Payer: MEDICAID

## 2019-11-11 VITALS
SYSTOLIC BLOOD PRESSURE: 137 MMHG | HEART RATE: 79 BPM | RESPIRATION RATE: 18 BRPM | DIASTOLIC BLOOD PRESSURE: 89 MMHG | OXYGEN SATURATION: 99 % | WEIGHT: 199.96 LBS | TEMPERATURE: 98 F

## 2019-11-11 DIAGNOSIS — R56.9 UNSPECIFIED CONVULSIONS: ICD-10-CM

## 2019-11-11 DIAGNOSIS — N44.00 TORSION OF TESTIS, UNSPECIFIED: Chronic | ICD-10-CM

## 2019-11-11 DIAGNOSIS — G40.909 EPILEPSY, UNSPECIFIED, NOT INTRACTABLE, WITHOUT STATUS EPILEPTICUS: ICD-10-CM

## 2019-11-11 LAB
ALBUMIN SERPL ELPH-MCNC: 4.4 G/DL — SIGNIFICANT CHANGE UP (ref 3.5–5.2)
ALP SERPL-CCNC: 92 U/L — SIGNIFICANT CHANGE UP (ref 30–115)
ALT FLD-CCNC: 67 U/L — HIGH (ref 0–41)
ANION GAP SERPL CALC-SCNC: 12 MMOL/L — SIGNIFICANT CHANGE UP (ref 7–14)
APTT BLD: 28.6 SEC — SIGNIFICANT CHANGE UP (ref 27–39.2)
AST SERPL-CCNC: 24 U/L — SIGNIFICANT CHANGE UP (ref 0–41)
BASOPHILS # BLD AUTO: 0.08 K/UL — SIGNIFICANT CHANGE UP (ref 0–0.2)
BASOPHILS NFR BLD AUTO: 1 % — SIGNIFICANT CHANGE UP (ref 0–1)
BILIRUB SERPL-MCNC: 0.2 MG/DL — SIGNIFICANT CHANGE UP (ref 0.2–1.2)
BUN SERPL-MCNC: 14 MG/DL — SIGNIFICANT CHANGE UP (ref 10–20)
CALCIUM SERPL-MCNC: 8.9 MG/DL — SIGNIFICANT CHANGE UP (ref 8.5–10.1)
CHLORIDE SERPL-SCNC: 101 MMOL/L — SIGNIFICANT CHANGE UP (ref 98–110)
CO2 SERPL-SCNC: 25 MMOL/L — SIGNIFICANT CHANGE UP (ref 17–32)
CREAT SERPL-MCNC: 1.1 MG/DL — SIGNIFICANT CHANGE UP (ref 0.7–1.5)
EOSINOPHIL # BLD AUTO: 0.3 K/UL — SIGNIFICANT CHANGE UP (ref 0–0.7)
EOSINOPHIL NFR BLD AUTO: 3.9 % — SIGNIFICANT CHANGE UP (ref 0–8)
GLUCOSE SERPL-MCNC: 103 MG/DL — HIGH (ref 70–99)
HCT VFR BLD CALC: 42.8 % — SIGNIFICANT CHANGE UP (ref 42–52)
HGB BLD-MCNC: 14.1 G/DL — SIGNIFICANT CHANGE UP (ref 14–18)
IMM GRANULOCYTES NFR BLD AUTO: 0.3 % — SIGNIFICANT CHANGE UP (ref 0.1–0.3)
INR BLD: 0.96 RATIO — SIGNIFICANT CHANGE UP (ref 0.65–1.3)
LYMPHOCYTES # BLD AUTO: 1.84 K/UL — SIGNIFICANT CHANGE UP (ref 1.2–3.4)
LYMPHOCYTES # BLD AUTO: 24.1 % — SIGNIFICANT CHANGE UP (ref 20.5–51.1)
MCHC RBC-ENTMCNC: 29.2 PG — SIGNIFICANT CHANGE UP (ref 27–31)
MCHC RBC-ENTMCNC: 32.9 G/DL — SIGNIFICANT CHANGE UP (ref 32–37)
MCV RBC AUTO: 88.6 FL — SIGNIFICANT CHANGE UP (ref 80–94)
MONOCYTES # BLD AUTO: 1.08 K/UL — HIGH (ref 0.1–0.6)
MONOCYTES NFR BLD AUTO: 14.1 % — HIGH (ref 1.7–9.3)
NEUTROPHILS # BLD AUTO: 4.33 K/UL — SIGNIFICANT CHANGE UP (ref 1.4–6.5)
NEUTROPHILS NFR BLD AUTO: 56.6 % — SIGNIFICANT CHANGE UP (ref 42.2–75.2)
NRBC # BLD: 0 /100 WBCS — SIGNIFICANT CHANGE UP (ref 0–0)
PLATELET # BLD AUTO: 200 K/UL — SIGNIFICANT CHANGE UP (ref 130–400)
POTASSIUM SERPL-MCNC: 4.5 MMOL/L — SIGNIFICANT CHANGE UP (ref 3.5–5)
POTASSIUM SERPL-SCNC: 4.5 MMOL/L — SIGNIFICANT CHANGE UP (ref 3.5–5)
PROT SERPL-MCNC: 7.3 G/DL — SIGNIFICANT CHANGE UP (ref 6–8)
PROTHROM AB SERPL-ACNC: 11 SEC — SIGNIFICANT CHANGE UP (ref 9.95–12.87)
RBC # BLD: 4.83 M/UL — SIGNIFICANT CHANGE UP (ref 4.7–6.1)
RBC # FLD: 13.2 % — SIGNIFICANT CHANGE UP (ref 11.5–14.5)
SODIUM SERPL-SCNC: 138 MMOL/L — SIGNIFICANT CHANGE UP (ref 135–146)
WBC # BLD: 7.65 K/UL — SIGNIFICANT CHANGE UP (ref 4.8–10.8)
WBC # FLD AUTO: 7.65 K/UL — SIGNIFICANT CHANGE UP (ref 4.8–10.8)

## 2019-11-11 PROCEDURE — 99284 EMERGENCY DEPT VISIT MOD MDM: CPT

## 2019-11-11 PROCEDURE — 70450 CT HEAD/BRAIN W/O DYE: CPT | Mod: 26

## 2019-11-11 RX ORDER — ESLICARBAZEPINE ACETATE 800 MG/1
800 TABLET ORAL ONCE
Refills: 0 | Status: COMPLETED | OUTPATIENT
Start: 2019-11-11 | End: 2019-11-11

## 2019-11-11 NOTE — ED PROVIDER NOTE - OBJECTIVE STATEMENT
29 year old male, hx seizure disorder, who presents s/p seizure. Patient with reported seizure 1 hour PTA with tongue biting. Last seizure 1 month ago. Denies nausea, vomiting, chest pain, SOB, headache, neck pain. Followed by neurologist at [facility] 29 year old male, hx seizure disorder, who presents s/p seizure. Patient with reported seizure 1 hour PTA with tongue biting. Last seizure 1 month ago. Denies nausea, vomiting, chest pain, SOB, headache, neck pain. Followed by neurologist at [facility]. Patient on keppra and aptium at home, took keppra 1 hour ago. 29 year old male, hx seizure disorder, who presents s/p seizure. Patient with reported seizure 1 hour PTA. Patient was standing in the bathroom when he had episode of seizure, denies hitting head, reports tongue biting. Last seizure 1 month ago. Denies nausea, vomiting, chest pain, SOB, headache, neck pain. Followed by neurologist at [facility]. Patient on Keppra and Aptium at home, took keppra 1 hour ago. 29 year old male, hx seizure disorder, who presents s/p seizure. Patient with reported seizure 1 hour PTA. Patient was standing in the bathroom when he had episode of seizure, denies hitting head, reports tongue biting. Last seizure 1 month ago. Denies nausea, vomiting, chest pain, SOB, headache, neck pain. Followed by neurologist at [facility]. Patient on Keppra and Aptium at home, took Keppra 1 hour ago.

## 2019-11-11 NOTE — ED PROVIDER NOTE - NS ED ROS FT
Review of Systems:  	•	CONSTITUTIONAL - no fever, no diaphoresis, no chills  	•	SKIN - no rash  	•	HEMATOLOGIC - no bleeding, no bruising  	•	EYES - no eye pain, no blurry vision  	•	RESPIRATORY - no shortness of breath, no cough  	•	CARDIAC - no chest pain, no palpitations  	•	GI - no abd pain, no nausea, no vomiting, no diarrhea  	•	GENITO-URINARY - no urinary incontinence   	•	MUSCULOSKELETAL - no joint paint, no swelling, no redness  	•	NEUROLOGIC - +seizure. no headache, no weakness, no paresthesia Review of Systems:  	•	CONSTITUTIONAL: no fever, no diaphoresis, no chills  	•	SKIN: no rash  	•	HEMATOLOGIC: no bleeding, no bruising  	•	EYES:  no eye pain, no blurry vision  	•	RESPIRATORY: no shortness of breath, no cough  	•	CARDIAC: no chest pain, no palpitations  	•	GI: no abd pain, no nausea, no vomiting, no diarrhea  	•	GENITO-URINARY: no urinary incontinence   	•	MUSCULOSKELETAL: no joint paint, no swelling, no redness  	•	NEUROLOGIC : +seizure. no headache, no weakness, no paresthesia

## 2019-11-11 NOTE — ED PROVIDER NOTE - CLINICAL SUMMARY MEDICAL DECISION MAKING FREE TEXT BOX
I personally evaluated the patient. I reviewed the Resident’s or Physician Assistant’s note (as assigned above), and agree with the findings and plan except as documented in my note. Labs and imaging unremovable, patient has an OP neuro appointment in a few days. I have fully discussed the medical management and delivery of care with the patient. I have discussed any available labs, imaging and treatment options with the patient. Patient confirms understanding and has been given detailed return precautions. Patient instructed to return to the ED should symptoms persist or worsen. Patient has demonstrated capacity and has verbalized understanding. Patient is well appearing upon discharge. repeat neuro exam prior to discharge non focal.

## 2019-11-11 NOTE — ED PROVIDER NOTE - ATTENDING CONTRIBUTION TO CARE
29 year old male, hx seizure disorder, who presents s/p seizure. Patient with reported seizure 1 hour PTA. No cp/sob, no n/v/d, no loc, patient state he has no recent trauma or illness.     CONSTITUTIONAL: Well-developed; well-nourished; in no acute distress. Sitting up and providing appropriate history and physical examination  SKIN: skin exam is warm and dry, no acute rash.  HEAD: Normocephalic; atraumatic.  EYES: PERRL, 3 mm bilateral, no nystagmus, EOM intact; conjunctiva and sclera clear.  ENT: No nasal discharge; airway clear.  NECK: Supple; non tender. + full passive ROM in all directions. No JVD  CARD: S1, S2 normal; no murmurs, gallops, or rubs. Regular rate and rhythm. + Symmetric Strong Pulses  RESP: No wheezes, rales or rhonchi. Good air movement bilaterally  ABD: soft; non-distended; non-tender. No Rebound, No Guarding, No signs of peritonitis, No CVA tenderness. No pulsatile abdominal mass. + Strong and Symmetric Pulses  EXT: Normal ROM. No clubbing, cyanosis or edema. Dp and Pt Pulses intact. Cap refill less than 3 seconds  NEURO: CN 2-12 intact, normal finger to nose, normal romberg, stable gait, no sensory or motor deficits, Alert, oriented, grossly unremarkable. No Focal deficits. GCS 15. NIH 0  PSYCH: Cooperative, appropriate.

## 2019-11-11 NOTE — ED PROVIDER NOTE - PHYSICAL EXAMINATION
CONST: Well appearing in NAD  EYES: PERRL, EOMI, Sclera and conjunctiva clear.   NECK: FROM, no c-spine tenderness   CARD: Normal S1 S2; Normal rate and rhythm  RESP: Equal BS B/L, No wheezes, rhonchi or rales. No respiratory distress  MS: Normal ROM in all extremities. No midline spinal tenderness. pulses 2 +.  SKIN: Warm, dry, no acute rashes.   NEURO: A&Ox3, No focal deficits. CN II-XII intact. Strength 5/5 with no sensory deficits. Steady gait. CONST: Well appearing in NAD  EYES: PERRL, EOMI, Sclera and conjunctiva clear.   ENT: Oropharynx normal, no erythema. Left lateral tongue with bite ronit, no active bleeding.   NECK: FROM, no c-spine tenderness   CARD: Normal S1 S2; Normal rate and rhythm  RESP: Equal BS B/L, No wheezes, rhonchi or rales. No respiratory distress  MS: Normal ROM in all extremities. No midline spinal tenderness. pulses 2 +.  SKIN: Warm, dry, no acute rashes.   NEURO: A&Ox3, No focal deficits. CN II-XII intact. Strength 5/5 with no sensory deficits. Steady gait.

## 2019-11-11 NOTE — ED PROVIDER NOTE - NSFOLLOWUPINSTRUCTIONS_ED_ALL_ED_FT
Please follow-up with your neurologist in 1-3 days.     Seizure    A seizure is abnormal electrical activity in the brain. There are various types of seizures with various causes. Prior to the seizure you may experience a warning sensation (aura) that may include fear, nausea, dizziness, and visual changes such as flashing lights of spots. Common symptoms during the seizure may including an altered mental status, rhythm jerking movements, drooling, grunting, loss of bladder or bowel control, tongue biting. After a seizure, you may feel confused and sleepy.     Do now swim, drive, operate machinery, or engage in any risky activity during which a seizure could cause further injury to you or others. Teach friends and family what to do if you have a seizure which includes laying you on the ground which your head on a cushion and turning you to the side to keep the airway clear in case of vomiting.     SEEK IMMEDIATE MEDICAL CARE IF YOU HAVE THE FOLLOWING SYMPTOMS: seizure lasting over 5 minutes, not waking up or altered mental status after the seizure, or more frequent or worsening seizures.

## 2019-11-11 NOTE — ED PROVIDER NOTE - PROGRESS NOTE DETAILS
Results d/w patient and family and copies of results provided.  Pt instructed to return if any worsening symptoms or concerns.  Discussed with patient follow up and care plan. They verbalize understanding all discussed and all questions answered. I was directly involved in the care of this patient. PA Fellow Chip note/plan reviewed and agreed.

## 2019-11-11 NOTE — ED PROVIDER NOTE - PATIENT PORTAL LINK FT
You can access the FollowMyHealth Patient Portal offered by [facility] by registering at the following website: [email or web address]. By joining [employer]’s FollowMyHealth portal, you will also be able to view your health information using other applications (apps) compatible with our system.

## 2019-11-12 ENCOUNTER — EMERGENCY (EMERGENCY)
Facility: HOSPITAL | Age: 29
LOS: 0 days | Discharge: LEFT BEFORE DISCHARGE | End: 2019-11-12
Attending: EMERGENCY MEDICINE | Admitting: EMERGENCY MEDICINE
Payer: MEDICAID

## 2019-11-12 VITALS
RESPIRATION RATE: 19 BRPM | WEIGHT: 210.1 LBS | TEMPERATURE: 98 F | OXYGEN SATURATION: 100 % | DIASTOLIC BLOOD PRESSURE: 72 MMHG | HEART RATE: 86 BPM | SYSTOLIC BLOOD PRESSURE: 125 MMHG | HEIGHT: 68 IN

## 2019-11-12 VITALS
HEART RATE: 84 BPM | SYSTOLIC BLOOD PRESSURE: 119 MMHG | RESPIRATION RATE: 18 BRPM | OXYGEN SATURATION: 98 % | DIASTOLIC BLOOD PRESSURE: 57 MMHG

## 2019-11-12 VITALS
RESPIRATION RATE: 18 BRPM | OXYGEN SATURATION: 98 % | HEART RATE: 83 BPM | SYSTOLIC BLOOD PRESSURE: 138 MMHG | DIASTOLIC BLOOD PRESSURE: 75 MMHG

## 2019-11-12 DIAGNOSIS — N44.00 TORSION OF TESTIS, UNSPECIFIED: Chronic | ICD-10-CM

## 2019-11-12 DIAGNOSIS — S00.512A ABRASION OF ORAL CAVITY, INITIAL ENCOUNTER: ICD-10-CM

## 2019-11-12 DIAGNOSIS — R56.9 UNSPECIFIED CONVULSIONS: ICD-10-CM
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
## 2019-11-12 DIAGNOSIS — G40.909 EPILEPSY, UNSPECIFIED, NOT INTRACTABLE, WITHOUT STATUS EPILEPTICUS: ICD-10-CM
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
## 2019-11-12 LAB
ALBUMIN SERPL ELPH-MCNC: 5 G/DL — SIGNIFICANT CHANGE UP (ref 3.5–5.2)
ALP SERPL-CCNC: 110 U/L — SIGNIFICANT CHANGE UP (ref 30–115)
ALT FLD-CCNC: 131 U/L — HIGH (ref 0–41)
ANION GAP SERPL CALC-SCNC: 25 MMOL/L — HIGH (ref 7–14)
APAP SERPL-MCNC: <5 UG/ML — LOW (ref 10–30)
APPEARANCE UR: CLEAR — SIGNIFICANT CHANGE UP
AST SERPL-CCNC: 74 U/L — HIGH (ref 0–41)
BACTERIA # UR AUTO: SIGNIFICANT CHANGE UP /HPF
BASOPHILS # BLD AUTO: 0.09 K/UL — SIGNIFICANT CHANGE UP (ref 0–0.2)
BASOPHILS NFR BLD AUTO: 0.8 % — SIGNIFICANT CHANGE UP (ref 0–1)
BILIRUB DIRECT SERPL-MCNC: <0.2 MG/DL — SIGNIFICANT CHANGE UP (ref 0–0.2)
BILIRUB INDIRECT FLD-MCNC: >0.5 MG/DL — SIGNIFICANT CHANGE UP (ref 0.2–1.2)
BILIRUB SERPL-MCNC: 0.7 MG/DL — SIGNIFICANT CHANGE UP (ref 0.2–1.2)
BILIRUB UR-MCNC: NEGATIVE — SIGNIFICANT CHANGE UP
BUN SERPL-MCNC: 14 MG/DL — SIGNIFICANT CHANGE UP (ref 10–20)
CALCIUM SERPL-MCNC: 9.8 MG/DL — SIGNIFICANT CHANGE UP (ref 8.5–10.1)
CHLORIDE SERPL-SCNC: 97 MMOL/L — LOW (ref 98–110)
CO2 SERPL-SCNC: 19 MMOL/L — SIGNIFICANT CHANGE UP (ref 17–32)
COLOR SPEC: YELLOW — SIGNIFICANT CHANGE UP
CREAT SERPL-MCNC: 1.1 MG/DL — SIGNIFICANT CHANGE UP (ref 0.7–1.5)
DIFF PNL FLD: ABNORMAL
EOSINOPHIL # BLD AUTO: 0.02 K/UL — SIGNIFICANT CHANGE UP (ref 0–0.7)
EOSINOPHIL NFR BLD AUTO: 0.2 % — SIGNIFICANT CHANGE UP (ref 0–8)
EPI CELLS # UR: ABNORMAL /HPF
ETHANOL SERPL-MCNC: <10 MG/DL — SIGNIFICANT CHANGE UP
GLUCOSE SERPL-MCNC: 105 MG/DL — HIGH (ref 70–99)
GLUCOSE UR QL: NEGATIVE MG/DL — SIGNIFICANT CHANGE UP
HCT VFR BLD CALC: 49.1 % — SIGNIFICANT CHANGE UP (ref 42–52)
HGB BLD-MCNC: 15.3 G/DL — SIGNIFICANT CHANGE UP (ref 14–18)
IMM GRANULOCYTES NFR BLD AUTO: 0.3 % — SIGNIFICANT CHANGE UP (ref 0.1–0.3)
KETONES UR-MCNC: NEGATIVE — SIGNIFICANT CHANGE UP
LEUKOCYTE ESTERASE UR-ACNC: NEGATIVE — SIGNIFICANT CHANGE UP
LYMPHOCYTES # BLD AUTO: 19.6 % — LOW (ref 20.5–51.1)
LYMPHOCYTES # BLD AUTO: 2.28 K/UL — SIGNIFICANT CHANGE UP (ref 1.2–3.4)
MCHC RBC-ENTMCNC: 28.9 PG — SIGNIFICANT CHANGE UP (ref 27–31)
MCHC RBC-ENTMCNC: 31.2 G/DL — LOW (ref 32–37)
MCV RBC AUTO: 92.6 FL — SIGNIFICANT CHANGE UP (ref 80–94)
MONOCYTES # BLD AUTO: 0.97 K/UL — HIGH (ref 0.1–0.6)
MONOCYTES NFR BLD AUTO: 8.3 % — SIGNIFICANT CHANGE UP (ref 1.7–9.3)
NEUTROPHILS # BLD AUTO: 8.24 K/UL — HIGH (ref 1.4–6.5)
NEUTROPHILS NFR BLD AUTO: 70.8 % — SIGNIFICANT CHANGE UP (ref 42.2–75.2)
NITRITE UR-MCNC: NEGATIVE — SIGNIFICANT CHANGE UP
NRBC # BLD: 0 /100 WBCS — SIGNIFICANT CHANGE UP (ref 0–0)
PH UR: 7 — SIGNIFICANT CHANGE UP (ref 5–8)
PLATELET # BLD AUTO: 202 K/UL — SIGNIFICANT CHANGE UP (ref 130–400)
POTASSIUM SERPL-MCNC: 4.7 MMOL/L — SIGNIFICANT CHANGE UP (ref 3.5–5)
POTASSIUM SERPL-SCNC: 4.7 MMOL/L — SIGNIFICANT CHANGE UP (ref 3.5–5)
PROT SERPL-MCNC: 8.3 G/DL — HIGH (ref 6–8)
PROT UR-MCNC: 30 MG/DL
RBC # BLD: 5.3 M/UL — SIGNIFICANT CHANGE UP (ref 4.7–6.1)
RBC # FLD: 13.5 % — SIGNIFICANT CHANGE UP (ref 11.5–14.5)
RBC CASTS # UR COMP ASSIST: SIGNIFICANT CHANGE UP /HPF
SALICYLATES SERPL-MCNC: <0.3 MG/DL — LOW (ref 4–30)
SODIUM SERPL-SCNC: 141 MMOL/L — SIGNIFICANT CHANGE UP (ref 135–146)
SP GR SPEC: 1.02 — SIGNIFICANT CHANGE UP (ref 1.01–1.03)
UROBILINOGEN FLD QL: 0.2 MG/DL — SIGNIFICANT CHANGE UP (ref 0.2–0.2)
WBC # BLD: 11.64 K/UL — HIGH (ref 4.8–10.8)
WBC # FLD AUTO: 11.64 K/UL — HIGH (ref 4.8–10.8)
WBC UR QL: SIGNIFICANT CHANGE UP /HPF

## 2019-11-12 PROCEDURE — 99284 EMERGENCY DEPT VISIT MOD MDM: CPT

## 2019-11-12 RX ORDER — SODIUM CHLORIDE 9 MG/ML
1000 INJECTION INTRAMUSCULAR; INTRAVENOUS; SUBCUTANEOUS ONCE
Refills: 0 | Status: COMPLETED | OUTPATIENT
Start: 2019-11-12 | End: 2019-11-12

## 2019-11-12 RX ORDER — LEVETIRACETAM 250 MG/1
2000 TABLET, FILM COATED ORAL ONCE
Refills: 0 | Status: DISCONTINUED | OUTPATIENT
Start: 2019-11-12 | End: 2019-11-12

## 2019-11-12 RX ORDER — LEVETIRACETAM 250 MG/1
1000 TABLET, FILM COATED ORAL ONCE
Refills: 0 | Status: COMPLETED | OUTPATIENT
Start: 2019-11-12 | End: 2019-11-12

## 2019-11-12 RX ADMIN — Medication 2 MILLIGRAM(S): at 20:10

## 2019-11-12 RX ADMIN — SODIUM CHLORIDE 1000 MILLILITER(S): 9 INJECTION INTRAMUSCULAR; INTRAVENOUS; SUBCUTANEOUS at 20:19

## 2019-11-12 RX ADMIN — LEVETIRACETAM 400 MILLIGRAM(S): 250 TABLET, FILM COATED ORAL at 20:24

## 2019-11-12 RX ADMIN — ESLICARBAZEPINE ACETATE 800 MILLIGRAM(S): 800 TABLET ORAL at 00:05

## 2019-11-12 NOTE — ED PROVIDER NOTE - NSFOLLOWUPINSTRUCTIONS_ED_ALL_ED_FT
Follow up with your primary care doctor and your Neurologist as soon as possible       Seizure in Adults    WHAT YOU NEED TO KNOW:    A seizure is a burst of electrical activity in your brain. A seizure may start in one part of your brain, or both sides may be affected. The seizure may last a few seconds or up to 5 minutes. A new-onset seizure is a seizure that happens for the first time. Some common triggers are alcohol, drugs, lack of sleep, fever, or an infection. High or low blood sugar levels, pregnancy, a head injury, or a stroke could also trigger a seizure. The cause of your seizure may not be known. You have a higher risk for another seizure within the next 2 years.    DISCHARGE INSTRUCTIONS:    Call your local emergency number (911 in the [place]) or have someone else call for any of the following:     Your seizure lasts longer than 5 minutes.      You have a second seizure that happens within 24 hours of your first.      You have trouble breathing after a seizure.      You cannot be woken after your seizure.      You have more than 1 seizure before you are fully awake or aware.    Call your doctor if:     You are injured during a seizure.      You have a fever.      You are planning to get pregnant or are currently pregnant.      You have questions or concerns about your condition or care.    Medicines: You may be given the following:     Antiepileptic medicine may control or prevent another seizure. Do not stop taking this medicine without direction from a healthcare provider.      Antibiotics treat an infection caused by bacteria.      Take your medicine as directed. Contact your healthcare provider if you think your medicine is not helping or if you have side effects. Tell him or her if you are allergic to any medicine. Keep a list of the medicines, vitamins, and herbs you take. Include the amounts, and when and why you take them. Bring the list or the pill bottles to follow-up visits. Carry your medicine list with you in case of an emergency.    What you can do to manage or prevent a seizure:     Manage stress. Stress can trigger a seizure. Exercise can help you reduce stress. Talk to your healthcare provider about exercise that is safe for you. Other ways to manage stress include yoga, meditation, and biofeedback. Illness can be a form of stress. Eat a variety of healthy foods and drink plenty of liquids during an illness.      Set a regular sleep schedule. A lack of sleep can trigger a seizure. Try to go to sleep and wake up at the same times every day. Keep your bedroom quiet and dark. Talk to your healthcare provider if you are having trouble sleeping.      Manage other medical conditions. Manage other health conditions that may increase your risk for a seizure. Keep your blood sugar levels and blood pressure under control.      Limit or do not drink alcohol as directed. Alcohol can trigger a seizure, especially if you drink a large amount at one time. A drink of alcohol is 12 ounces of beer, 1½ ounces of liquor, or 5 ounces of wine. Talk to your healthcare provider about a safe amount of alcohol for you. Your provider may recommend that you do not drink any alcohol. Tell him or her if you need help to quit drinking.      Ask what safety precautions you should take. Talk with your healthcare provider about driving. You may not be able to drive until you are seizure-free for a period of time. You will need to check the law where you live. Also talk to your healthcare provider about swimming and bathing. You may drown or develop life-threatening heart or lung damage if you have a seizure in water.      Tell your friends, family members, and coworkers that you had a seizure. Give them written instructions to follow if you have another seizure.    Follow up with your healthcare provider or neurologist as directed: You may need more tests to find the cause of your seizure. Write down your questions so you remember to ask them during your visits.        © Copyright [employer] 2019 All illustrations and images included in CareNotes are the copyrighted property of [employer] or [employer]

## 2019-11-12 NOTE — ED PROVIDER NOTE - PROGRESS NOTE DETAILS
Pt had witness tonic clonic seizure that lasted less than 30seconds, patient reported aura and was post ictal afterwards. Pt was given Ativan after seizure and keppra switched to IV The patient wishes to leave against medical advice.  I have discussed the risks, benefits and alternatives (including the possibility of worsening of disease, pain, permanent disability, and/or death) with the patient and his/her family (if available).  The patient voices understanding of these risks, benefits, and alternatives and still wishes to sign out against medical advice.  The patient is awake, alert, oriented  x 3 and has demonstrated capacity to refuse/direct care.  I have advised the patient that they can and should return immediately should they develop any worse/different/additional symptoms, or if they change their mind and want to continue their care.

## 2019-11-12 NOTE — ED PROVIDER NOTE - CLINICAL SUMMARY MEDICAL DECISION MAKING FREE TEXT BOX
Pt with 3 seizures in last 24 hrs.  Rec admission for further observation however patient is feeling improved and wants to go home.  He saw Neurologist yesterday and will adjust medications as directed by them.  Pt is AAO x 3, has been ambulating in Ed with steady gait,  will go AMA. Pt instructed to return if any worsening symptoms or concerns.  They verbalize understanding.

## 2019-11-12 NOTE — ED PROVIDER NOTE - ATTENDING CONTRIBUTION TO CARE
stand-by assist
28 yo M PMHx noted presents after having a seizure today while on the bus.  Pt was seen in ED last night for seizure as well.  Labs and Ct done.  Pt saw his Neurologist today who adjusted medications.  While in ED patient had another seizure witnessed by staff.  Ativan was given.  On exam pt in NAD AAO x 3, no signs of trauma, Lungs cta b/l no wrr, good tone, equal strength, abd soft nt nd

## 2019-11-12 NOTE — ED ADULT NURSE NOTE - NS PRO PASSIVE SMOKE EXP
Yes...
I have reviewed and confirmed nurses' notes for patient's medications, allergies, medical history, and surgical history.

## 2019-11-12 NOTE — ED PROVIDER NOTE - PATIENT PORTAL LINK FT
You can access the FollowMyHealth Patient Portal offered by [facility] by registering at the following website: [email or web address]. By joining [employer]’s FollowMyHealth portal, you will also be able to view your health information using other applications (apps) compatible with our system.

## 2019-11-12 NOTE — ED PROVIDER NOTE - OBJECTIVE STATEMENT
29 year old male seen in emergency room yesterday for seizure was d/c home after normal labs and ct scan. patient states seen by neuro today who told him to up his aptiom medication from 800-> 1000mg tonight at his scheduled time which is 11pm . patient states that today while on bus had a small seizure described as shaking and staring. patient in emergency room now back to normal. denies chest pain and shortness of breath.

## 2019-11-12 NOTE — ED PROVIDER NOTE - PHYSICAL EXAMINATION
Physical Exam    Vital Signs: I have reviewed the initial vital signs.  Constitutional: well-nourished, appears stated age, no acute distress  Eyes: Conjunctiva pink, Sclera clear, PERRLA, EOMI.  Mouth: Left lateral tongue abrasion   Cardiovascular: S1 and S2, regular rate, regular rhythm, well-perfused extremities, radial pulses equal and 2+  Respiratory: unlabored respiratory effort, clear to auscultation bilaterally no wheezing, rales and rhonchi  Gastrointestinal: soft, non-tender abdomen, no pulsatile mass, normal bowl sounds  Musculoskeletal: supple neck, no lower extremity edema, no midline tenderness  Integumentary: warm, dry, no rash  Neurologic: awake, alert, cranial nerves II-XII grossly intact, extremities’ motor and sensory functions grossly intact  Psychiatric: appropriate mood, appropriate affect

## 2019-11-12 NOTE — ED ADULT NURSE NOTE - NSIMPLEMENTINTERV_GEN_ALL_ED
Implemented All Universal Safety Interventions:  [place] to call system. Call bell, personal items and telephone within reach. Instruct patient to call for assistance. Room bathroom lighting operational. Non-slip footwear when patient is off stretcher. Physically safe environment: no spills, clutter or unnecessary equipment. Stretcher in lowest position, wheels locked, appropriate side rails in place.

## 2019-11-16 LAB — LEVETIRACETAM SERPL-MCNC: 25 MCG/ML — SIGNIFICANT CHANGE UP (ref 12–46)

## 2019-12-13 ENCOUNTER — APPOINTMENT (OUTPATIENT)
Dept: NEUROLOGY | Facility: CLINIC | Age: 29
End: 2019-12-13

## 2020-02-01 ENCOUNTER — OUTPATIENT (OUTPATIENT)
Dept: OUTPATIENT SERVICES | Facility: HOSPITAL | Age: 30
LOS: 1 days | End: 2020-02-01
Payer: MEDICAID

## 2020-02-01 DIAGNOSIS — N44.00 TORSION OF TESTIS, UNSPECIFIED: Chronic | ICD-10-CM

## 2020-02-07 ENCOUNTER — APPOINTMENT (OUTPATIENT)
Dept: NEUROLOGY | Facility: CLINIC | Age: 30
End: 2020-02-07
Payer: MEDICAID

## 2020-02-07 VITALS
BODY MASS INDEX: 33.65 KG/M2 | HEART RATE: 67 BPM | DIASTOLIC BLOOD PRESSURE: 71 MMHG | OXYGEN SATURATION: 99 % | WEIGHT: 222 LBS | SYSTOLIC BLOOD PRESSURE: 109 MMHG | HEIGHT: 68 IN

## 2020-02-07 DIAGNOSIS — Z87.891 PERSONAL HISTORY OF NICOTINE DEPENDENCE: ICD-10-CM

## 2020-02-07 DIAGNOSIS — G40.909 EPILEPSY, UNSPECIFIED, NOT INTRACTABLE, W/OUT STATUS EPILEPTICUS: ICD-10-CM

## 2020-02-07 DIAGNOSIS — Z78.9 OTHER SPECIFIED HEALTH STATUS: ICD-10-CM

## 2020-02-07 DIAGNOSIS — F19.90 OTHER PSYCHOACTIVE SUBSTANCE USE, UNSPECIFIED, UNCOMPLICATED: ICD-10-CM

## 2020-02-07 PROCEDURE — ZZZZZ: CPT

## 2020-02-07 NOTE — DISCUSSION/SUMMARY
[FreeTextEntry1] : Impression: \par 1) Complex partial seizures - witnessed in the office. Patient staring at phone and tearing from both eyes and then started to hypersalivate from the mouth.  \par \par Plan: \par 1) Lab work\par 2) Admit for VEEG to classify seizures and adjust medications. \par 3) Take an extra 0.5 mg tab of clonazepam when experiencing an aura\par 4) Continue use of CBD oil

## 2020-02-07 NOTE — PHYSICAL EXAM
[FreeTextEntry1] : General:\par Constitutional:  Sitting comfortably in NAD.\par Psychiatric: well-groomed, appropriate affect, insight/judgment intact\par Ears, Nose, Throat: no abnormalities, mucus membranes moist\par Mallampati: \par Neck: supple, no lymphadenopathy or nodules palpable\par Neck Circumference:\par Cardiovascular: regular rate and rhythm, normal S1/S2, no murmurs \par Chest: Clear to bases. 	Abdomen: soft, non-tender\par Extremities: no edema, clubbing or cyanosis\par Skin:  no rash or neurocutaneous signs \par \par Cognitive: retention 4/5 \par Orientation, language, memory and knowledge screens intact.\par Cranial Nerves:\par II: Full to confrontation; disc margins sharp. III/IV/VI: PERRL EOMF No nystagmus\par V1V2V3: Symmetric, VII: Face appears symmetric VIII: Normal to screening, IX/X: Palate Elevates Symmetrical  XI: Trapezius Symmetric  XII: Tongue midline\par Motor:\par Power: 5/5 throughout, tone: normal x 4 limbs, no tremor \par Sensation:\par Intact to light touch. \par Coordination/Gait:\par Finger-nose-finger intact, normal rapid-alternating movements.\par Fine motor normal with normal rapid finger taps and heel-toe tapping \par Narrow based gait, tandem forward and back ok\par Hops well on both feet, heel and toe walking normal \par Reflexes:\par DTR: 1-2+ symmetric x 4 limbs, finger flexors with BR and biceps;\par Plantar response: down x 2

## 2020-02-07 NOTE — HISTORY OF PRESENT ILLNESS
[FreeTextEntry1] : Delfin is a 29 yo, right handed, male. who is here for an initial evaluation for his epilepsy.  Had his first seizure mid December 2014, the day before that seizure he had been out drinking. His first seizure was described as a grandmal. He states his last large seizure has been October. \par \par Describes sometimes not having a seizure but having a lapse in time where he is tired but nothing happens. Or being on the phone at work and out of no where coming back too and realizing that he has just missed a period of time.  \par todd Possibly has about 5-6 seizures a month, but they cluster together. Then he can go for 2-3 months without having them. \par \belgica Has sensitivity to light. \par \par Aura description:\par Deep anxiety that starts from the head and travels to the pit of the stomach and his hands become sweaty and the next he knows he blacks out.\par Feels that he can provoke an aura.  \par \par Denies headaches. \par \par Referred by Dr. Michael Moura at [facility] neurological care.  \par \belgica Was recently let go from his job. \par \belgica Was admitted to the hospital in October for 3 days for VEEG. Unable to remember which hospital. That was after a large seizure. \par \par Trialed meds: \par Lamictal (Intolerance)\par Depakote (Possibly tried depakote unable to remember)

## 2020-02-10 LAB
ALBUMIN SERPL ELPH-MCNC: 4.8 G/DL
ALP BLD-CCNC: 106 U/L
ALT SERPL-CCNC: 27 U/L
ANION GAP SERPL CALC-SCNC: 15 MMOL/L
AST SERPL-CCNC: 15 U/L
BASOPHILS # BLD AUTO: 0.08 K/UL
BASOPHILS NFR BLD AUTO: 1.1 %
BILIRUB SERPL-MCNC: 0.3 MG/DL
BUN SERPL-MCNC: 12 MG/DL
CALCIUM SERPL-MCNC: 9.3 MG/DL
CHLORIDE SERPL-SCNC: 94 MMOL/L
CO2 SERPL-SCNC: 26 MMOL/L
CREAT SERPL-MCNC: 1.05 MG/DL
EOSINOPHIL # BLD AUTO: 0.22 K/UL
EOSINOPHIL NFR BLD AUTO: 3.1 %
GLUCOSE SERPL-MCNC: 63 MG/DL
HCT VFR BLD CALC: 47.1 %
HGB BLD-MCNC: 15.4 G/DL
IMM GRANULOCYTES NFR BLD AUTO: 0.3 %
LEVETIRACETAM SERPL-MCNC: 39.8 MCG/ML
LYMPHOCYTES # BLD AUTO: 1.53 K/UL
LYMPHOCYTES NFR BLD AUTO: 21.3 %
MAN DIFF?: NORMAL
MCHC RBC-ENTMCNC: 29.2 PG
MCHC RBC-ENTMCNC: 32.7 GM/DL
MCV RBC AUTO: 89.2 FL
MONOCYTES # BLD AUTO: 0.92 K/UL
MONOCYTES NFR BLD AUTO: 12.8 %
NEUTROPHILS # BLD AUTO: 4.42 K/UL
NEUTROPHILS NFR BLD AUTO: 61.4 %
PLATELET # BLD AUTO: 244 K/UL
POTASSIUM SERPL-SCNC: 4.5 MMOL/L
PROT SERPL-MCNC: 7.4 G/DL
RBC # BLD: 5.28 M/UL
RBC # FLD: 13.5 %
SODIUM SERPL-SCNC: 135 MMOL/L
WBC # FLD AUTO: 7.19 K/UL

## 2020-02-11 LAB — OXCARBAZEPINE SERPL-MCNC: 14 UG/ML

## 2020-02-12 LAB — CLONAZEPAM SERPL-MCNC: 13 MCG/L

## 2020-02-26 ENCOUNTER — INPATIENT (INPATIENT)
Facility: HOSPITAL | Age: 30
LOS: 3 days | Discharge: HOME | DRG: 101 | End: 2020-03-01
Attending: PSYCHIATRY & NEUROLOGY | Admitting: PSYCHIATRY & NEUROLOGY
Payer: COMMERCIAL

## 2020-02-26 VITALS
WEIGHT: 222.45 LBS | HEART RATE: 76 BPM | SYSTOLIC BLOOD PRESSURE: 108 MMHG | TEMPERATURE: 98 F | DIASTOLIC BLOOD PRESSURE: 65 MMHG | RESPIRATION RATE: 18 BRPM | HEIGHT: 69 IN

## 2020-02-26 DIAGNOSIS — G40.909 EPILEPSY, UNSPECIFIED, NOT INTRACTABLE, WITHOUT STATUS EPILEPTICUS: ICD-10-CM

## 2020-02-26 DIAGNOSIS — N44.00 TORSION OF TESTIS, UNSPECIFIED: Chronic | ICD-10-CM

## 2020-02-26 DIAGNOSIS — G40.219 LOCALIZATION-RELATED (FOCAL) (PARTIAL) SYMPTOMATIC EPILEPSY AND EPILEPTIC SYNDROMES WITH COMPLEX PARTIAL SEIZURES, INTRACTABLE, WITHOUT STATUS EPILEPTICUS: ICD-10-CM

## 2020-02-26 DIAGNOSIS — Z29.9 ENCOUNTER FOR PROPHYLACTIC MEASURES, UNSPECIFIED: ICD-10-CM

## 2020-02-26 LAB
ANION GAP SERPL CALC-SCNC: 11 MMOL/L — SIGNIFICANT CHANGE UP (ref 5–17)
BUN SERPL-MCNC: 12 MG/DL — SIGNIFICANT CHANGE UP (ref 7–23)
CALCIUM SERPL-MCNC: 8.6 MG/DL — SIGNIFICANT CHANGE UP (ref 8.4–10.5)
CHLORIDE SERPL-SCNC: 100 MMOL/L — SIGNIFICANT CHANGE UP (ref 96–108)
CO2 SERPL-SCNC: 22 MMOL/L — SIGNIFICANT CHANGE UP (ref 22–31)
CREAT SERPL-MCNC: 0.9 MG/DL — SIGNIFICANT CHANGE UP (ref 0.5–1.3)
GLUCOSE SERPL-MCNC: 102 MG/DL — HIGH (ref 70–99)
HBA1C BLD-MCNC: 5.5 % — SIGNIFICANT CHANGE UP (ref 4–5.6)
HCT VFR BLD CALC: 39.5 % — SIGNIFICANT CHANGE UP (ref 39–50)
HGB BLD-MCNC: 13.5 G/DL — SIGNIFICANT CHANGE UP (ref 13–17)
MCHC RBC-ENTMCNC: 29.2 PG — SIGNIFICANT CHANGE UP (ref 27–34)
MCHC RBC-ENTMCNC: 34.2 GM/DL — SIGNIFICANT CHANGE UP (ref 32–36)
MCV RBC AUTO: 85.5 FL — SIGNIFICANT CHANGE UP (ref 80–100)
NRBC # BLD: 0 /100 WBCS — SIGNIFICANT CHANGE UP (ref 0–0)
PLATELET # BLD AUTO: 200 K/UL — SIGNIFICANT CHANGE UP (ref 150–400)
POTASSIUM SERPL-MCNC: 3.7 MMOL/L — SIGNIFICANT CHANGE UP (ref 3.5–5.3)
POTASSIUM SERPL-SCNC: 3.7 MMOL/L — SIGNIFICANT CHANGE UP (ref 3.5–5.3)
RBC # BLD: 4.62 M/UL — SIGNIFICANT CHANGE UP (ref 4.2–5.8)
RBC # FLD: 13 % — SIGNIFICANT CHANGE UP (ref 10.3–14.5)
SODIUM SERPL-SCNC: 133 MMOL/L — LOW (ref 135–145)
WBC # BLD: 8.52 K/UL — SIGNIFICANT CHANGE UP (ref 3.8–10.5)
WBC # FLD AUTO: 8.52 K/UL — SIGNIFICANT CHANGE UP (ref 3.8–10.5)

## 2020-02-26 RX ORDER — CLONAZEPAM 1 MG
0 TABLET ORAL
Qty: 0 | Refills: 0 | DISCHARGE

## 2020-02-26 RX ORDER — LAMOTRIGINE 25 MG/1
1 TABLET, ORALLY DISINTEGRATING ORAL
Qty: 0 | Refills: 0 | DISCHARGE

## 2020-02-26 RX ORDER — LEVETIRACETAM 250 MG/1
1500 TABLET, FILM COATED ORAL
Refills: 0 | Status: DISCONTINUED | OUTPATIENT
Start: 2020-02-26 | End: 2020-02-27

## 2020-02-26 RX ORDER — CLONAZEPAM 1 MG
0.5 TABLET ORAL
Refills: 0 | Status: DISCONTINUED | OUTPATIENT
Start: 2020-02-26 | End: 2020-03-01

## 2020-02-26 RX ORDER — CLONAZEPAM 1 MG
0.5 TABLET ORAL
Refills: 0 | Status: DISCONTINUED | OUTPATIENT
Start: 2020-02-26 | End: 2020-02-26

## 2020-02-26 RX ORDER — LEVETIRACETAM 250 MG/1
0 TABLET, FILM COATED ORAL
Qty: 0 | Refills: 0 | DISCHARGE

## 2020-02-26 RX ORDER — LEVETIRACETAM 250 MG/1
2000 TABLET, FILM COATED ORAL
Qty: 0 | Refills: 0 | DISCHARGE

## 2020-02-26 RX ORDER — ESLICARBAZEPINE ACETATE 800 MG/1
1000 TABLET ORAL AT BEDTIME
Refills: 0 | Status: DISCONTINUED | OUTPATIENT
Start: 2020-02-26 | End: 2020-02-27

## 2020-02-26 RX ORDER — CLONAZEPAM 1 MG
0.5 TABLET ORAL DAILY
Refills: 0 | Status: DISCONTINUED | OUTPATIENT
Start: 2020-02-26 | End: 2020-02-26

## 2020-02-26 RX ADMIN — LEVETIRACETAM 1500 MILLIGRAM(S): 250 TABLET, FILM COATED ORAL at 21:56

## 2020-02-26 RX ADMIN — ESLICARBAZEPINE ACETATE 1000 MILLIGRAM(S): 800 TABLET ORAL at 23:34

## 2020-02-26 RX ADMIN — Medication 0.5 MILLIGRAM(S): at 21:56

## 2020-02-26 NOTE — H&P ADULT - PROBLEM SELECTOR PLAN 2
F: replete as needed F: none   E: replete as needed   N: regular diet   GI ppx: none   DVT ppx: ambulatory, none   RMF epilepsy   FULL CODE

## 2020-02-26 NOTE — H&P ADULT - NSHPPHYSICALEXAM_GEN_ALL_CORE
.  VITAL SIGNS:  T(C): 36.6 (02-26-20 @ 16:46), Max: 36.6 (02-26-20 @ 16:46)  T(F): 97.9 (02-26-20 @ 16:46), Max: 97.9 (02-26-20 @ 16:46)  HR: 76 (02-26-20 @ 16:46) (76 - 76)  BP: 108/65 (02-26-20 @ 16:46) (108/65 - 108/65)  BP(mean): --  RR: 18 (02-26-20 @ 16:46) (18 - 18)  SpO2: --  Wt(kg): --    PHYSICAL EXAM:    Constitutional: WDWN resting comfortably in bed; NAD  Eyes: PERRL, EOMI, anicteric sclera  ENT: no nasal discharge; uvula midline, no oropharyngeal erythema or exudates; MMM  Neck: supple; no JVD or thyromegaly  Respiratory: CTA B/L; no W/R/R, no retractions  Cardiac: +S1/S2; RRR; no M/R/G; PMI non-displaced  Gastrointestinal: abdomen soft, NT/ND; no rebound or guarding; +BSx4  Extremities: WWP, no clubbing or cyanosis; no peripheral edema  Musculoskeletal: NROM x4; no joint swelling, tenderness or erythema  Vascular: 2+ radial, femoral, DP/PT pulses B/L  Dermatologic: skin warm, dry and intact; no rashes, wounds, or scars  Neurologic: AAOx3; CNII-XII grossly intact; no focal deficits, finger to nose testing normal, gait normal, rapid alternating hand movements normal  Psychiatric: affect and characteristics of appearance, verbalizations, behaviors are appropriate .  VITAL SIGNS:  T(C): 36.6 (02-26-20 @ 16:46), Max: 36.6 (02-26-20 @ 16:46)  T(F): 97.9 (02-26-20 @ 16:46), Max: 97.9 (02-26-20 @ 16:46)  HR: 76 (02-26-20 @ 16:46) (76 - 76)  BP: 108/65 (02-26-20 @ 16:46) (108/65 - 108/65)  BP(mean): --  RR: 18 (02-26-20 @ 16:46) (18 - 18)  SpO2: --  Wt(kg): --    PHYSICAL EXAM:    Constitutional: WDWN overweight young male, resting comfortably in bed; NAD  Eyes: PERRL, EOMI, anicteric sclera  ENT: no nasal discharge; uvula midline, no oropharyngeal erythema or exudates; MMM  Neck: supple; no JVD or thyromegaly  Respiratory: CTA B/L; no W/R/R, no retractions  Cardiac: +S1/S2; RRR; no M/R/G; PMI non-displaced  Gastrointestinal: abdomen soft, NT/ND; no rebound or guarding; +BSx4  Extremities: WWP, no clubbing or cyanosis; no peripheral edema  Musculoskeletal: NROM x4; no joint swelling, tenderness or erythema  Vascular: 2+ radial, femoral, DP/PT pulses B/L  Dermatologic: skin warm, dry and intact; no rashes, wounds, or scars  Neurologic: AAOx3; CNII-XII grossly intact; no focal deficits, finger to nose testing normal, gait normal, rapid alternating hand movements normal  Psychiatric: affect and characteristics of appearance, verbalizations, behaviors are appropriate

## 2020-02-26 NOTE — H&P ADULT - HISTORY OF PRESENT ILLNESS
29 year old, right handed M with hx of epilepsy who presents as a direct admit for vEEG monitoring and medication adjustment. Pt still having minor seizures with current medication regimen--Keppra 2000mg BID, klonopin 0.5mg daily, and Aptiom 1000mg qhs. Reports good compliance with medications. Pt has been following with Dr. Stallings at [facility] neurological care; he has been asking patient to come in to [facility] for vEEG monitoring and medication adjustment. First seizure was in 2014, which happened when he was hungover. He had 2 more in that same year and started seeing a neurologist at that time. Denies trauma or head injury. Pt cannot describe his seizures, never asked family members what his seizures look like however outpatient notes report grand mal seizures. Pt endorses biting tongue and urinary incontinence sometimes during seizures. Is postictal afterwards. Describes his auras as sweating in the hands as well as intense anxiety. Reports that he will have events at work where he will have "spaced out" and will find himself having drooled onto his desk and a long period of time had passed. Also has happened while he was waiting for the bus recently. Most recent seizure was one week ago where he had the drooling episode. One prior to that was January.  Thinks that bright lights trigger seizures. Reports that marijuana helps get rid of his auras. When he wakes up with aura, he smokes marijuana to help ease himself and they go away. Denies any other symptoms--denies chest pain, shortness of breath, vision changes, numbness/tingling, weakness, abdominal pain, or urinary symptoms.    Of note, was admitted to [facility] for vEEG monitoring in 2/7/2020 after which no changes were made and Dr. Stallings urged him to come to [facility] for evaluation.     On arrival, T 97.9F, HR 76, /65, RR 18. 29 year old, right handed M with hx of epilepsy who presents as a direct admit for vEEG monitoring and medication adjustment. Pt still having minor seizures with current medication regimen--Keppra 2000mg BID, klonopin 0.5mg daily, and Aptiom 1000mg qhs. Reports good compliance with medications. Pt has been following with Dr. Stallings at [facility] neurological care; he has been asking patient to come in to [facility] for vEEG monitoring and medication adjustment. First seizure was in 2014, which happened when he was hungover. He had 2 more in that same year and started seeing a neurologist at that time. Denies trauma or head injury. Pt cannot describe his seizures, never asked family members what his seizures look like however outpatient notes report grand mal seizures. Pt endorses biting tongue and urinary incontinence sometimes during seizures. Is postictal afterwards. Describes his auras as sweating in the hands as well as intense anxiety. Reports that he will have events at work where he will have "spaced out" and will find himself having drooled onto his desk and a long period of time had passed. Also has happened while he was waiting for the bus recently. Most recent seizure was one week ago where he had the drooling episode. One prior to that was January.  Thinks that bright lights trigger seizures. Reports that marijuana helps get rid of his auras. When he wakes up with aura, he smokes marijuana to help ease himself and they go away. Reaction to lamictal--throat swells up. Denies any other symptoms--denies chest pain, shortness of breath, vision changes, numbness/tingling, weakness, abdominal pain, or urinary symptoms.    Of note, was admitted to [facility] for vEEG monitoring in 2/7/2020 after which no changes were made. vEEG at this time showed probable R temporal focus. Dr. Stallings urged him to come to [facility] for evaluation.     On arrival, T 97.9F, HR 76, /65, RR 18. 29 year old, right handed M with hx of epilepsy who presents as a direct admit for vEEG monitoring and medication adjustment. Pt still having minor seizures with current medication regimen--Keppra 2000mg BID, klonopin 0.5mg daily, and Aptiom 1000mg qhs. Reports good compliance with medications. Pt has been following with Dr. Stallings at [facility] neurological care; he has been asking patient to come in to [facility] for vEEG monitoring and medication adjustment. First seizure was in 2014, which happened when he was hungover. He had 2 more in that same year and started seeing a neurologist at that time. Denies trauma or head injury. Pt cannot describe his seizures, never asked family members what his seizures look like however outpatient notes report grand mal seizures. Pt endorses biting tongue and urinary incontinence sometimes during seizures. Is postictal afterwards. Describes his auras as sweating in the hands as well as intense anxiety. Reports that he will have events at work where he will have "spaced out" and will find himself having drooled onto his desk and a long period of time had passed. Also has happened while he was waiting for the bus recently. Most recent seizure was one week ago where he had the drooling episode. One prior to that was January.  Thinks that bright lights trigger seizures. Reports that marijuana helps get rid of his auras. When he wakes up with aura, he smokes marijuana to help ease himself and they go away. Reaction to lamictal--throat swells up. Denies any other symptoms--denies chest pain, shortness of breath, vision changes, numbness/tingling, weakness, abdominal pain, or urinary symptoms.    Of note, was admitted to [facility] for vEEG monitoring in 2/7/2020 after which no changes were made. vEEG at this time showed probable R temporal focus. Dr. Moura urged him to come to [facility] for evaluation.     On arrival, T 97.9F, HR 76, /65, RR 18.

## 2020-02-26 NOTE — H&P ADULT - ASSESSMENT
29 year old, right handed M with hx of epilepsy (?grand mal) who presents as a direct admit for vEEG monitoring and medication adjustment. 29 year old, right handed M with hx of epilepsy (?grand mal, ?R temporal lobe focus) who presents as a direct admit for vEEG monitoring and medication adjustment in setting of having seizures on current regimen.

## 2020-02-26 NOTE — PATIENT PROFILE ADULT - LAST ORAL INTAKE
After Visit Summary   2017    Adela Kelly    MRN: [id number]           Patient Information     Date Of Birth          1966        Visit Information        Provider Department      2017 1:00 PM [facility]        Today's Diagnoses     Encounter for screening for eye and ear disorders           Follow-ups after your visit        Future tests that were ordered for you today     Open Future Orders        Priority Expected Expires Ordered     Routine  3/21/2018 2017            Who to contact     Please call your clinic at [phone] to:    Ask questions about your health    Make or cancel appointments    Discuss your medicines    Learn about your test results    Speak to your doctor   If you have compliments or concerns about an experience at your clinic, or if you wish to file a complaint, please contact [facility] Patient Relations at [phone] or email us at [email or web address]         Additional Information About Your Visit        MyChart Information     [employer] is an electronic gateway that provides easy, online access to your medical records. With [employer], you can request a clinic appointment, read your test results, renew a prescription or communicate with your care team.     To sign up for [employer] visit the website at [email or web address]   You will be asked to enter the access code listed below, as well as some personal information. Please follow the directions to create your username and password.     Your access code is: [email or web address]  Expires: 2018  5:30 AM     Your access code will  in 90 days. If you need help or a new code, please contact your [facility] Clinic or call [phone] for assistance.        Care EveryWhere ID     This is your Care EveryWhere ID. This could be used by other organizations to access your [facility]  records  [id number]         Blood Pressure from Last 3 Encounters:   No data found for BP    Weight from Last 3 Encounters:   No data found for Wt              We Performed the Following     FFERG OU (both eyes)        Primary Care Provider Office Phone # Fax #    Alise Foley [phone]       [facility] [address] [place] [address]        Equal Access to Services     [facility] CampusPRIYA : Hadii aad ku hadasho Soomaali, waaxda luqadaha, qaybta kaalmada adeegyada, waxay idiin hayaan adeeg kharash la'aan . So [facility] [phone].    ATENCIÓN: Si habla esprichard, tiene a callahan disposición servicios gratuitos de asistencia lingüística. Llame al [phone].    We comply with applicable federal civil rights laws and [place] laws. We do not discriminate on the basis of race, color, national origin, age, disability, sex, sexual orientation, or gender identity.            Thank you!     Thank you for choosing EYE CLINIC  for your care. Our goal is always to provide you with excellent care. Hearing back from our patients is one way we can continue to improve our services. Please take a few minutes to complete the written survey that you may receive in the mail after your visit with us. Thank you!             Your Updated Medication List - Protect others around you: Learn how to safely use, store and throw away your medicines at www.disposemymeds.org.          This list is accurate as of: 12/21/17 11:59 PM.  Always use your most recent med list.                   Brand Name Dispense Instructions for use Diagnosis    B-100 COMPLEX Tabs      Take 100 mg by mouth        citalopram 40 MG tablet    celeXA     Take 40 mg by mouth        clindamycin 150 MG capsule    CLEOCIN     Take 300 mg by mouth        cyanocobalamin 1000 MCG Subl sublingual tablet      Place 1,000 mcg under the tongue        EPINEPHrine 0.3 MG/0.3ML injection 2-pack    EPIPEN/ADRENACLICK/or ANY BX GENERIC EQUIV          furosemide  40 MG tablet    LASIX     Take 40 mg by mouth        lisinopril 10 MG tablet    PRINIVIL/ZESTRIL     Take 10 mg by mouth        oxyCODONE-acetaminophen 7.5-325 MG per tablet    PERCOCET          pregabalin 75 MG capsule    LYRICA     Take 75 mg by mouth           26-Feb-2020

## 2020-02-26 NOTE — H&P ADULT - NSHPSOCIALHISTORY_GEN_ALL_CORE
lives with family in home (mom and aunt)   former smoker for 3 years (3-6 cigs/day), quit 2 months ago   +alcohol (3 times a month, 2-3 drinks per time_   +marijuana (daily, 4 times a day)

## 2020-02-26 NOTE — H&P ADULT - PROBLEM SELECTOR PLAN 1
Pt with hx of epilepsy (?grand mal, per outside hospital note) since Dec 2014. No hx of head trauma or accidents. Not well controlled as patient is still having little seizures on this current medication regimen.   - vEEG monitoring   - IV ativan 2mg prn for seizures lasting >2 minutes or clustering  - continue home keppra 2000mg BID   - continue home klonopin 0.5mg daily   - continue home Aptiom 1000mg qhs Pt with hx of epilepsy (?grand mal, per outside hospital note from 2/7 focus in the R temporal lobe) since Dec 2014. No hx of head trauma or accidents. Not well controlled as patient is still having little seizures on this current medication regimen. Dr. Gordon saw him outpatient--pt had event where pt had blank stare and drooling on first meeting Dr. Gordon in the office and did not remember the event.   - vEEG monitoring for seizure activity  - IV ativan 2mg prn for seizures lasting >2 minutes or clustering  - decrease home keppra 2000mg BID to 1500mg BID  - continue home klonopin 0.5mg BID  - continue home Aptiom 1000mg qhs  - avoid lamictal as pt has bad reaction--throat swelling and difficulty breathing Pt with hx of epilepsy (?grand mal, per outside hospital note from 2/7 focus in the R temporal lobe) since Dec 2014. No hx of head trauma or accidents. Not well controlled as patient is still having focal seizures on this current medication regimen. Dr. Gordon saw him outpatient--pt had event where pt had blank stare and drooling on first meeting Dr. Gordon in the office and did not remember the event.   - vEEG monitoring for seizure activity  - IV ativan 2mg prn for seizures lasting >2 minutes or clustering  - decrease home keppra 2000mg BID to 1500mg BID  - continue home klonopin 0.5mg BID  - continue home Aptiom 1000mg qhs  - avoid lamictal as pt has bad reaction--throat swelling and difficulty breathing

## 2020-02-27 LAB
ANION GAP SERPL CALC-SCNC: 10 MMOL/L — SIGNIFICANT CHANGE UP (ref 5–17)
BUN SERPL-MCNC: 10 MG/DL — SIGNIFICANT CHANGE UP (ref 7–23)
CALCIUM SERPL-MCNC: 8.6 MG/DL — SIGNIFICANT CHANGE UP (ref 8.4–10.5)
CHLORIDE SERPL-SCNC: 102 MMOL/L — SIGNIFICANT CHANGE UP (ref 96–108)
CO2 SERPL-SCNC: 22 MMOL/L — SIGNIFICANT CHANGE UP (ref 22–31)
CREAT SERPL-MCNC: 0.9 MG/DL — SIGNIFICANT CHANGE UP (ref 0.5–1.3)
GLUCOSE SERPL-MCNC: 102 MG/DL — HIGH (ref 70–99)
HCT VFR BLD CALC: 42 % — SIGNIFICANT CHANGE UP (ref 39–50)
HGB BLD-MCNC: 14.2 G/DL — SIGNIFICANT CHANGE UP (ref 13–17)
MAGNESIUM SERPL-MCNC: 2 MG/DL — SIGNIFICANT CHANGE UP (ref 1.6–2.6)
MCHC RBC-ENTMCNC: 28.9 PG — SIGNIFICANT CHANGE UP (ref 27–34)
MCHC RBC-ENTMCNC: 33.8 GM/DL — SIGNIFICANT CHANGE UP (ref 32–36)
MCV RBC AUTO: 85.4 FL — SIGNIFICANT CHANGE UP (ref 80–100)
NRBC # BLD: 0 /100 WBCS — SIGNIFICANT CHANGE UP (ref 0–0)
PLATELET # BLD AUTO: 198 K/UL — SIGNIFICANT CHANGE UP (ref 150–400)
POTASSIUM SERPL-MCNC: 4.8 MMOL/L — SIGNIFICANT CHANGE UP (ref 3.5–5.3)
POTASSIUM SERPL-SCNC: 4.8 MMOL/L — SIGNIFICANT CHANGE UP (ref 3.5–5.3)
RBC # BLD: 4.92 M/UL — SIGNIFICANT CHANGE UP (ref 4.2–5.8)
RBC # FLD: 13.2 % — SIGNIFICANT CHANGE UP (ref 10.3–14.5)
SODIUM SERPL-SCNC: 134 MMOL/L — LOW (ref 135–145)
WBC # BLD: 4.87 K/UL — SIGNIFICANT CHANGE UP (ref 3.8–10.5)
WBC # FLD AUTO: 4.87 K/UL — SIGNIFICANT CHANGE UP (ref 3.8–10.5)

## 2020-02-27 PROCEDURE — 99223 1ST HOSP IP/OBS HIGH 75: CPT

## 2020-02-27 PROCEDURE — 95720 EEG PHY/QHP EA INCR W/VEEG: CPT

## 2020-02-27 RX ORDER — ESLICARBAZEPINE ACETATE 800 MG/1
1200 TABLET ORAL DAILY
Refills: 0 | Status: DISCONTINUED | OUTPATIENT
Start: 2020-02-27 | End: 2020-02-29

## 2020-02-27 RX ORDER — ZONISAMIDE 100 MG
50 CAPSULE ORAL
Refills: 0 | Status: DISCONTINUED | OUTPATIENT
Start: 2020-02-27 | End: 2020-02-28

## 2020-02-27 RX ORDER — LEVETIRACETAM 250 MG/1
1000 TABLET, FILM COATED ORAL EVERY 12 HOURS
Refills: 0 | Status: DISCONTINUED | OUTPATIENT
Start: 2020-02-27 | End: 2020-02-28

## 2020-02-27 RX ADMIN — LEVETIRACETAM 1000 MILLIGRAM(S): 250 TABLET, FILM COATED ORAL at 11:24

## 2020-02-27 RX ADMIN — Medication 0.5 MILLIGRAM(S): at 18:46

## 2020-02-27 RX ADMIN — ESLICARBAZEPINE ACETATE 1200 MILLIGRAM(S): 800 TABLET ORAL at 23:23

## 2020-02-27 RX ADMIN — Medication 50 MILLIGRAM(S): at 15:58

## 2020-02-27 RX ADMIN — LEVETIRACETAM 1000 MILLIGRAM(S): 250 TABLET, FILM COATED ORAL at 21:55

## 2020-02-27 RX ADMIN — Medication 0.5 MILLIGRAM(S): at 07:06

## 2020-02-27 NOTE — PROGRESS NOTE ADULT - SUBJECTIVE AND OBJECTIVE BOX
OVERNIGHT EVENTS: EEG with right posterior temporal seizures and witnessed seizure overnight.      SUBJECTIVE / INTERVAL HPI: Patient seen and examined at bedside this AM and on rounds. Patient without complaints ROS negative.   Patient with seizure while neuro exam being performed on rounds - blank stare while standing, unable to remember previous events. Oriented to time, place and date.     VITAL SIGNS:  Vital Signs Last 24 Hrs  T(C): 36.3 (27 Feb 2020 10:47), Max: 36.8 (27 Feb 2020 05:26)  T(F): 97.3 (27 Feb 2020 10:47), Max: 98.2 (27 Feb 2020 05:26)  HR: 76 (27 Feb 2020 10:47) (62 - 76)  BP: 104/58 (27 Feb 2020 10:47) (96/60 - 108/65)  BP(mean): --  RR: 16 (27 Feb 2020 10:47) (16 - 18)  SpO2: 96% (27 Feb 2020 10:47) (96% - 97%)    PHYSICAL EXAM:    General: pleasant WDWN male resting comfortably in bed; NAD   HEENT: NC/AT; PERRL, anicteric sclera; MMM  Neck: supple; no JVD; no cervical or submandibular lymphadenopathy   Cardiovascular: +S1/S2; RRR  Respiratory: CTA B/L; no W/R/R  Gastrointestinal: soft, NT/ND; +BSx4  Extremities: WWP; no edema, clubbing or cyanosis  Vascular: 2+ radial, DP/PT pulses B/L  Neurological: AAOx3; no focal deficits  - Mental Status:  AAOx3; speech is fluent   - Cranial Nerves II-XII:    II:  PERRLA; visual fields are full to confrontation  III, IV, VI:  EOMI, no nystagmus  V:  facial sensation is intact in the V1-V3 distribution bilaterally.  VII:  face is symmetric with normal eye closure and smile  VIII:  hearing is intact to finger rub  IX, X:  uvula is midline and soft palate rises symmetrically  XI:  head turning and shoulder shrug are intact bilaterally  XII:  tongue protrudes in the midline  - Motor:  strength is 5/5 throughout; normal muscle bulk and tone throughout; no pronator drift  - Reflexes:  2+ and symmetric at the biceps, brachioradialis, knees  - Coordination:  finger-nose-finger and heel-knee-shin intact without dysmetria; rapid alternating hand movements intact  - Gait:  normal steps, base, arm swing, and turning; heel and toe walking are normal; tandem gait is normal; Romberg testing is negative      MEDICATIONS:  MEDICATIONS  (STANDING):  clonazePAM  Tablet 0.5 milliGRAM(s) Oral two times a day  eslicarbazepine 1200 milliGRAM(s) Oral daily  levETIRAcetam 1000 milliGRAM(s) Oral every 12 hours    MEDICATIONS  (PRN):  LORazepam   Injectable 2 milliGRAM(s) IV Push every 4 hours PRN Seizure activity      ALLERGIES:  Allergies    Lamictal (Unknown)    Intolerances        LABS:                        14.2   4.87  )-----------( 198      ( 27 Feb 2020 06:36 )             42.0     02-27    134<L>  |  102  |  10  ----------------------------<  102<H>  4.8   |  22  |  0.90    Ca    8.6      27 Feb 2020 06:36  Mg     2.0     02-27          CAPILLARY BLOOD GLUCOSE          RADIOLOGY & ADDITIONAL TESTS: Reviewed.    ASSESSMENT:    PLAN: OVERNIGHT EVENTS: EEG with right posterior temporal seizures and witnessed seizure this morning.  Then second seizure prior to rounds, also right posterior temporal then he removed the electrodes himself.    SUBJECTIVE / INTERVAL HPI: Patient seen and examined at bedside this AM and on rounds. Patient without complaints ROS negative.   Patient with 3rd seizure while neuro exam being performed on rounds - blank stare while standing, unable to remember previous events. Oriented to time, place and date shortly following    VITAL SIGNS:  Vital Signs Last 24 Hrs  T(C): 36.3 (27 Feb 2020 10:47), Max: 36.8 (27 Feb 2020 05:26)  T(F): 97.3 (27 Feb 2020 10:47), Max: 98.2 (27 Feb 2020 05:26)  HR: 76 (27 Feb 2020 10:47) (62 - 76)  BP: 104/58 (27 Feb 2020 10:47) (96/60 - 108/65)  BP(mean): --  RR: 16 (27 Feb 2020 10:47) (16 - 18)  SpO2: 96% (27 Feb 2020 10:47) (96% - 97%)    PHYSICAL EXAM:    General: pleasant WDWN male resting comfortably in bed; NAD   HEENT: NC/AT; PERRL, anicteric sclera; MMM  Neck: supple; no JVD; no cervical or submandibular lymphadenopathy   Cardiovascular: +S1/S2; RRR  Respiratory: CTA B/L; no W/R/R  Gastrointestinal: soft, NT/ND; +BSx4  Extremities: WWP; no edema, clubbing or cyanosis  Vascular: 2+ radial, DP/PT pulses B/L  Neurological: AAOx3; no focal deficits  - Mental Status:  AAOx3; speech is fluent   - Cranial Nerves II-XII:    II:  PERRLA; visual fields are full to confrontation  III, IV, VI:  EOMI, no nystagmus  V:  facial sensation is intact in the V1-V3 distribution bilaterally.  VII:  face is symmetric with normal eye closure and smile  VIII:  hearing is intact to finger rub  IX, X:  uvula is midline and soft palate rises symmetrically  XI:  head turning and shoulder shrug are intact bilaterally  XII:  tongue protrudes in the midline  - Motor:  strength is 5/5 throughout; normal muscle bulk and tone throughout; no pronator drift  - Reflexes:  2+ and symmetric at the biceps, brachioradialis, knees  - Coordination:  finger-nose-finger and heel-knee-shin intact without dysmetria; rapid alternating hand movements intact  - Gait:  normal steps, base, arm swing, and turning; heel and toe walking are normal; tandem gait is normal; Romberg testing is negative      MEDICATIONS:  MEDICATIONS  (STANDING):  clonazePAM  Tablet 0.5 milliGRAM(s) Oral two times a day  eslicarbazepine 1200 milliGRAM(s) Oral daily  levETIRAcetam 1000 milliGRAM(s) Oral every 12 hours    MEDICATIONS  (PRN):  LORazepam   Injectable 2 milliGRAM(s) IV Push every 4 hours PRN Seizure activity      ALLERGIES:  Allergies    Lamictal (Unknown)    Intolerances        LABS:                        14.2   4.87  )-----------( 198      ( 27 Feb 2020 06:36 )             42.0     02-27    134<L>  |  102  |  10  ----------------------------<  102<H>  4.8   |  22  |  0.90    Ca    8.6      27 Feb 2020 06:36  Mg     2.0     02-27          CAPILLARY BLOOD GLUCOSE          RADIOLOGY & ADDITIONAL TESTS: Reviewed.    ASSESSMENT:    PLAN:

## 2020-02-27 NOTE — PROGRESS NOTE ADULT - PROBLEM SELECTOR PLAN 1
Pt with hx of epilepsy (?grand mal, per outside hospital note from 2/7 focus in the R temporal lobe) since Dec 2014. No hx of head trauma or accidents. Not well controlled as patient is still having little seizures on this current medication regimen. Dr. [place] saw him outpatient--pt had event where pt had blank stare and drooling on first meeting Dr. Gordon in the office and did not remember the event.   - vEEG monitoring for seizure activity  - IV ativan 1mg prn for seizures lasting >2 minutes or clustering  - keppra decreased to 1000mg BID  - continue home klonopin 0.5mg BID  - Aptiom increased to 1200mg qhs  - initiated Zonegran 50mg BID   - avoid lamictal as pt has bad reaction--throat swelling and difficulty breathing Pt with hx of epilepsy (?grand mal, per outside hospital note from 2/7 focus in the R temporal lobe) since Dec 2014. No hx of head trauma or accidents. Not well controlled as patient is still having little seizures on this current medication regimen. Dr. [place] saw him outpatient--pt had event where pt had blank stare and drooling on first meeting Dr. [place] in the office and did not remember the event.   - 3 seizures this morning, possibly occurring as per baseline, as pt amnestic to many events, but possibly related to decrease in levetiracetam.  - vEEG monitoring for seizure activity  - IV ativan 1mg prn for seizures lasting >2 minutes or clustering  - keppra decreased to 1000mg BID  - continue home klonopin 0.5mg BID  - Aptiom increased to 200/1000mg qhs  - initiated Zonegran 50mg BID   - avoid lamictal as pt has bad reaction--throat swelling and difficulty breathing

## 2020-02-27 NOTE — SBIRT NOTE ADULT - NSSBIRTDRGBRIEFINTDET_GEN_A_CORE
Patient reported smoking 4 joints of marijuana daily. Patient denies any other substance use at this time. SW offered patient resources, patient declined.

## 2020-02-27 NOTE — SBIRT NOTE ADULT - NSSBIRTBRIEFINTDET_GEN_A_CORE
Patient reported drinking on weekends anywhere from 6 to 10+ drinks. Patient reported cutting back on his alcohol intake after his first seizure in 2014. SW offered patient resources, patient declined.

## 2020-02-28 LAB
ANION GAP SERPL CALC-SCNC: 11 MMOL/L — SIGNIFICANT CHANGE UP (ref 5–17)
BUN SERPL-MCNC: 12 MG/DL — SIGNIFICANT CHANGE UP (ref 7–23)
CALCIUM SERPL-MCNC: 8.7 MG/DL — SIGNIFICANT CHANGE UP (ref 8.4–10.5)
CHLORIDE SERPL-SCNC: 100 MMOL/L — SIGNIFICANT CHANGE UP (ref 96–108)
CO2 SERPL-SCNC: 23 MMOL/L — SIGNIFICANT CHANGE UP (ref 22–31)
CREAT SERPL-MCNC: 1.01 MG/DL — SIGNIFICANT CHANGE UP (ref 0.5–1.3)
GLUCOSE SERPL-MCNC: 102 MG/DL — HIGH (ref 70–99)
LEVETIRACETAM SERPL-MCNC: 22 MCG/ML — SIGNIFICANT CHANGE UP (ref 12–46)
MAGNESIUM SERPL-MCNC: 2.1 MG/DL — SIGNIFICANT CHANGE UP (ref 1.6–2.6)
POTASSIUM SERPL-MCNC: 4.5 MMOL/L — SIGNIFICANT CHANGE UP (ref 3.5–5.3)
POTASSIUM SERPL-SCNC: 4.5 MMOL/L — SIGNIFICANT CHANGE UP (ref 3.5–5.3)
SODIUM SERPL-SCNC: 134 MMOL/L — LOW (ref 135–145)

## 2020-02-28 PROCEDURE — 99233 SBSQ HOSP IP/OBS HIGH 50: CPT

## 2020-02-28 PROCEDURE — 95720 EEG PHY/QHP EA INCR W/VEEG: CPT

## 2020-02-28 RX ORDER — ZONISAMIDE 100 MG
100 CAPSULE ORAL EVERY 24 HOURS
Refills: 0 | Status: DISCONTINUED | OUTPATIENT
Start: 2020-02-28 | End: 2020-03-01

## 2020-02-28 RX ORDER — LEVETIRACETAM 250 MG/1
750 TABLET, FILM COATED ORAL EVERY 12 HOURS
Refills: 0 | Status: DISCONTINUED | OUTPATIENT
Start: 2020-02-28 | End: 2020-02-29

## 2020-02-28 RX ORDER — ZONISAMIDE 100 MG
50 CAPSULE ORAL EVERY 24 HOURS
Refills: 0 | Status: DISCONTINUED | OUTPATIENT
Start: 2020-02-29 | End: 2020-03-01

## 2020-02-28 RX ADMIN — Medication 100 MILLIGRAM(S): at 17:44

## 2020-02-28 RX ADMIN — ESLICARBAZEPINE ACETATE 1200 MILLIGRAM(S): 800 TABLET ORAL at 21:58

## 2020-02-28 RX ADMIN — LEVETIRACETAM 750 MILLIGRAM(S): 250 TABLET, FILM COATED ORAL at 21:58

## 2020-02-28 RX ADMIN — Medication 0.5 MILLIGRAM(S): at 07:41

## 2020-02-28 RX ADMIN — LEVETIRACETAM 1000 MILLIGRAM(S): 250 TABLET, FILM COATED ORAL at 10:42

## 2020-02-28 RX ADMIN — Medication 50 MILLIGRAM(S): at 07:41

## 2020-02-28 RX ADMIN — Medication 0.5 MILLIGRAM(S): at 17:44

## 2020-02-28 NOTE — PROGRESS NOTE ADULT - PROBLEM SELECTOR PLAN 1
Pt with hx of epilepsy (?grand mal, per outside hospital note from 2/7 focus in the R temporal lobe) since Dec 2014. No hx of head trauma or accidents. Not well controlled as patient is still having little seizures on this current medication regimen. Dr. [place] saw him outpatient--pt had event where pt had blank stare and drooling on first meeting Dr. Godron in the office and did not remember the event.   - 3 seizures this morning, possibly occurring as per baseline, as pt amnestic to many events, but possibly related to decrease in levetiracetam.  - vEEG monitoring for seizure activity  - IV ativan 1mg prn for seizures lasting >2 minutes or clustering  - keppra decreased to 750mg BID (starting tonight)  - continue home klonopin 0.5mg BID  - Aptiom increased to 1200mg qhs  - c/w Zonegran 50mg BID and inc eventing dose to 100mg  - avoid lamictal as pt has bad reaction--throat swelling and difficulty breathing Pt with hx of epilepsy (?grand mal, per outside hospital note from 2/7 focus in the R temporal lobe) since Dec 2014. No hx of head trauma or accidents. Not well controlled as patient is still having little seizures on this current medication regimen. Dr. [place] saw him outpatient--pt had event where pt had blank stare and drooling on first meeting Dr. [place] in the office and did not remember the event.   - 3 seizures this morning, possibly occurring as per baseline, as pt amnestic to many events, but possibly related to decrease in levetiracetam. -> lamitcal level WNL  - vEEG monitoring for seizure activity  - IV ativan 1mg prn for seizures lasting >2 minutes or clustering  - keppra decreased to 750mg BID (starting tonight)  - continue home klonopin 0.5mg BID  - Aptiom increased to 1200mg qhs  - c/w Zonegran 50mg BID and inc eventing dose to 100mg  - avoid lamictal as pt has bad reaction--throat swelling and difficulty breathing

## 2020-02-28 NOTE — PROGRESS NOTE ADULT - SUBJECTIVE AND OBJECTIVE BOX
OVERNIGHT EVENTS: No acute events overnight. Afebrile. VSS. HD stable.   No events on video EEG. No seizures since 2/27/20 12PM.     SUBJECTIVE / INTERVAL HPI: Patient seen and examined at bedside. No complaints able to remember yesterday. ROS negative.     VITAL SIGNS:  Vital Signs Last 24 Hrs  T(C): 36.7 (28 Feb 2020 11:38), Max: 36.8 (27 Feb 2020 20:30)  T(F): 98 (28 Feb 2020 11:38), Max: 98.3 (27 Feb 2020 20:30)  HR: 75 (28 Feb 2020 11:38) (59 - 77)  BP: 118/79 (28 Feb 2020 11:38) (105/63 - 144/78)  BP(mean): --  RR: 16 (28 Feb 2020 11:38) (16 - 16)  SpO2: 99% (28 Feb 2020 11:38) (98% - 99%)    PHYSICAL EXAM:    General: WDWN  HEENT: NC/AT; PERRL, anicteric sclera; MMM  Neck: supple  Cardiovascular: +S1/S2; RRR  Respiratory: CTA B/L; no W/R/R  Gastrointestinal: soft, NT/ND; +BSx4  Extremities: WWP; no edema, clubbing or cyanosis  Vascular: 2+ radial, DP/PT pulses B/L  Neurological: AAOx3; no focal deficits    MEDICATIONS:  MEDICATIONS  (STANDING):  clonazePAM  Tablet 0.5 milliGRAM(s) Oral two times a day  eslicarbazepine 1200 milliGRAM(s) Oral daily  levETIRAcetam 750 milliGRAM(s) Oral every 12 hours  zonisamide 100 milliGRAM(s) Oral every 24 hours    MEDICATIONS  (PRN):  LORazepam   Injectable 2 milliGRAM(s) IV Push every 4 hours PRN Seizure activity      ALLERGIES:  Allergies    Lamictal (Unknown)    Intolerances        LABS:                        14.2   4.87  )-----------( 198      ( 27 Feb 2020 06:36 )             42.0     02-28    134<L>  |  100  |  12  ----------------------------<  102<H>  4.5   |  23  |  1.01    Ca    8.7      28 Feb 2020 05:27  Mg     2.1     02-28          CAPILLARY BLOOD GLUCOSE          RADIOLOGY & ADDITIONAL TESTS: Reviewed.    ASSESSMENT:    PLAN: OVERNIGHT EVENTS: No acute events overnight. Afebrile. VSS. HD stable.   No events on video EEG. No seizures since 2/27/20 12PM.     SUBJECTIVE / INTERVAL HPI: Patient seen and examined at bedside. No complaints able to remember parts of yesterday but some details lost from the time during seizures.  Most recent seizure was around noon yesterday.  ROS negative.     VITAL SIGNS:  Vital Signs Last 24 Hrs  T(C): 36.7 (28 Feb 2020 11:38), Max: 36.8 (27 Feb 2020 20:30)  T(F): 98 (28 Feb 2020 11:38), Max: 98.3 (27 Feb 2020 20:30)  HR: 75 (28 Feb 2020 11:38) (59 - 77)  BP: 118/79 (28 Feb 2020 11:38) (105/63 - 144/78)  BP(mean): --  RR: 16 (28 Feb 2020 11:38) (16 - 16)  SpO2: 99% (28 Feb 2020 11:38) (98% - 99%)    PHYSICAL EXAM:    General: WDWN  HEENT: NC/AT; PERRL, anicteric sclera; MMM  Neck: supple  Cardiovascular: +S1/S2; RRR  Respiratory: CTA B/L; no W/R/R  Gastrointestinal: soft, NT/ND; +BSx4  Extremities: WWP; no edema, clubbing or cyanosis  Vascular: 2+ radial, DP/PT pulses B/L  Neurological: AAOx3; no focal deficits    MEDICATIONS:  MEDICATIONS  (STANDING):  clonazePAM  Tablet 0.5 milliGRAM(s) Oral two times a day  eslicarbazepine 1200 milliGRAM(s) Oral daily  levETIRAcetam 750 milliGRAM(s) Oral every 12 hours  zonisamide 100 milliGRAM(s) Oral every 24 hours    MEDICATIONS  (PRN):  LORazepam   Injectable 2 milliGRAM(s) IV Push every 4 hours PRN Seizure activity      ALLERGIES:  Allergies    Lamictal (Unknown)    Intolerances        LABS:                        14.2   4.87  )-----------( 198      ( 27 Feb 2020 06:36 )             42.0     02-28    134<L>  |  100  |  12  ----------------------------<  102<H>  4.5   |  23  |  1.01    Ca    8.7      28 Feb 2020 05:27  Mg     2.1     02-28          CAPILLARY BLOOD GLUCOSE          RADIOLOGY & ADDITIONAL TESTS: Reviewed.    ASSESSMENT:    PLAN:

## 2020-02-29 ENCOUNTER — TRANSCRIPTION ENCOUNTER (OUTPATIENT)
Age: 30
End: 2020-02-29

## 2020-02-29 LAB
ANION GAP SERPL CALC-SCNC: 13 MMOL/L — SIGNIFICANT CHANGE UP (ref 5–17)
BUN SERPL-MCNC: 14 MG/DL — SIGNIFICANT CHANGE UP (ref 7–23)
CALCIUM SERPL-MCNC: 8.8 MG/DL — SIGNIFICANT CHANGE UP (ref 8.4–10.5)
CHLORIDE SERPL-SCNC: 99 MMOL/L — SIGNIFICANT CHANGE UP (ref 96–108)
CO2 SERPL-SCNC: 22 MMOL/L — SIGNIFICANT CHANGE UP (ref 22–31)
CREAT SERPL-MCNC: 1 MG/DL — SIGNIFICANT CHANGE UP (ref 0.5–1.3)
GLUCOSE SERPL-MCNC: 99 MG/DL — SIGNIFICANT CHANGE UP (ref 70–99)
MAGNESIUM SERPL-MCNC: 2 MG/DL — SIGNIFICANT CHANGE UP (ref 1.6–2.6)
POTASSIUM SERPL-MCNC: 4.2 MMOL/L — SIGNIFICANT CHANGE UP (ref 3.5–5.3)
POTASSIUM SERPL-SCNC: 4.2 MMOL/L — SIGNIFICANT CHANGE UP (ref 3.5–5.3)
SODIUM SERPL-SCNC: 134 MMOL/L — LOW (ref 135–145)

## 2020-02-29 PROCEDURE — 99232 SBSQ HOSP IP/OBS MODERATE 35: CPT

## 2020-02-29 PROCEDURE — 95720 EEG PHY/QHP EA INCR W/VEEG: CPT

## 2020-02-29 RX ORDER — LEVETIRACETAM 250 MG/1
500 TABLET, FILM COATED ORAL EVERY 12 HOURS
Refills: 0 | Status: DISCONTINUED | OUTPATIENT
Start: 2020-02-29 | End: 2020-03-01

## 2020-02-29 RX ORDER — ESLICARBAZEPINE ACETATE 800 MG/1
1400 TABLET ORAL AT BEDTIME
Refills: 0 | Status: DISCONTINUED | OUTPATIENT
Start: 2020-02-29 | End: 2020-03-01

## 2020-02-29 RX ADMIN — ESLICARBAZEPINE ACETATE 1400 MILLIGRAM(S): 800 TABLET ORAL at 21:32

## 2020-02-29 RX ADMIN — LEVETIRACETAM 500 MILLIGRAM(S): 250 TABLET, FILM COATED ORAL at 10:52

## 2020-02-29 RX ADMIN — Medication 0.5 MILLIGRAM(S): at 06:46

## 2020-02-29 RX ADMIN — Medication 50 MILLIGRAM(S): at 06:46

## 2020-02-29 RX ADMIN — LEVETIRACETAM 500 MILLIGRAM(S): 250 TABLET, FILM COATED ORAL at 21:31

## 2020-02-29 RX ADMIN — Medication 0.5 MILLIGRAM(S): at 18:19

## 2020-02-29 RX ADMIN — Medication 100 MILLIGRAM(S): at 18:19

## 2020-02-29 NOTE — DISCHARGE NOTE PROVIDER - NSDCMRMEDTOKEN_GEN_ALL_CORE_FT
Aptiom: 1000 milligram(s) orally once a day (at bedtime)  Keppra: 2 gram(s) orally 2 times a day  KlonoPIN 0.5 mg oral tablet: orally 2 times a day eslicarbazepine 200 mg oral tablet: 7 tab(s) orally once a day (at bedtime)  KlonoPIN 0.5 mg oral tablet: orally 2 times a day  levETIRAcetam 500 mg oral tablet: 1 tab(s) orally every 12 hours  zonisamide 100 mg oral capsule: 1 cap(s) orally every 24 hours  zonisamide 50 mg oral capsule: 1 cap(s) orally every 24 hours

## 2020-02-29 NOTE — PROGRESS NOTE ADULT - PROBLEM SELECTOR PLAN 2
F: none   E: replete as needed   N: regular diet   GI ppx: none   DVT ppx: ambulatory, none   RM[facility] epilepsy   FULL CODE    dispo: home pending
F: none   E: replete as needed   N: regular diet   GI ppx: none   DVT ppx: ambulatory, none   RMF epilepsy   FULL CODE
F: none   E: replete as needed   N: regular diet   GI ppx: none   DVT ppx: ambulatory, none   [facility] epilepsy   FULL CODE    dispo: home pending

## 2020-02-29 NOTE — DISCHARGE NOTE PROVIDER - PROVIDER TOKENS
PROVIDER:[TOKEN:[[id number]]] PROVIDER:[TOKEN:[[id number]],FOLLOWUP:[Routine]] PROVIDER:[TOKEN:[[id number]],FOLLOWUP:[2 weeks]]

## 2020-02-29 NOTE — DISCHARGE NOTE PROVIDER - CARE PROVIDERS DIRECT ADDRESSES
[email or web address]

## 2020-02-29 NOTE — DISCHARGE NOTE PROVIDER - NSDCCPTREATMENT_GEN_ALL_CORE_FT
PRINCIPAL PROCEDURE  Procedure: EEG awake and asleep  Findings and Treatment: PRINCIPAL PROCEDURE  Procedure: EEG awake and asleep  Findings and Treatment: Right posterior temporal seizures

## 2020-02-29 NOTE — PROGRESS NOTE ADULT - ASSESSMENT
29 year old, right handed M with hx of epilepsy (?grand mal, ?R temporal lobe focus) who presents as a direct admit for vEEG monitoring and medication adjustment in setting of having seizures on current regimen.

## 2020-02-29 NOTE — DISCHARGE NOTE PROVIDER - NSDCFUADDINST_GEN_ALL_CORE_FT
Please follow up with your primary care provider within 2 weeks of this hospitalization. Please call his office to make an appointment.    Please bring discharge paperwork to all follow up appointments.

## 2020-02-29 NOTE — PROGRESS NOTE ADULT - PROBLEM SELECTOR PLAN 1
Pt with hx of epilepsy (?grand mal, per outside hospital note from 2/7 focus in the R temporal lobe) since Dec 2014. No hx of head trauma or accidents. Not well controlled as patient is still having little seizures on this current medication regimen. Dr. [place] saw him outpatient--pt had event where pt had blank stare and drooling on first meeting Dr. [place] in the office and did not remember the event.   - 3 seizures this morning, possibly occurring as per baseline, as pt amnestic to many events, but possibly related to decrease in levetiracetam. -> lamitcal level WNL  - vEEG monitoring for seizure activity  - IV ativan 1mg prn for seizures lasting >2 minutes or clustering  - keppra decreased to 750mg BID yesterday, will drop to 500mg bid today and increase aptiom to 1400mg/d.  - continue home klonopin 0.5mg BID  - Aptiom increased to 1200mg qhs  - c/w Zonegran 50mg BID and inc eventing dose to 100mg  - avoid lamictal as pt has bad reaction--throat swelling and difficulty breathing

## 2020-02-29 NOTE — DISCHARGE NOTE PROVIDER - NSDCCPCAREPLAN_GEN_ALL_CORE_FT
PRINCIPAL DISCHARGE DIAGNOSIS  Diagnosis: Seizure  Assessment and Plan of Treatment: PRINCIPAL DISCHARGE DIAGNOSIS  Diagnosis: Seizure  Assessment and Plan of Treatment: A seizure is a sudden, uncontrolled electrical disturbance in the brain. It can cause changes in your behavior, movements or feelings, and in levels of consciousness. If you have two or more seizures or a tendency to have recurrent seizures, you have epilepsy. There are many types of seizures, which range in severity. Seizure types vary by where and how they begin in the brain. Most seizures last from 30 seconds to two minutes. A seizure that lasts longer than five minutes is a medical emergency. You were placed on EEG and had a few seizures while in the hospital. EEG utilzes  electrodes to record the electrical activity of your brain, which shows up as wavy lines on an EEG recording. The EEG may reveal a pattern that tells doctors whether a seizure is likely to occur again.

## 2020-02-29 NOTE — DISCHARGE NOTE PROVIDER - CARE PROVIDER_API CALL
Anatoly [place] (MD)  Clinical Neurophysiology; EEGEpilepsy; Neurology; Sleep Medicine  [address], [address] [place] [address], [place] [address]  Phone: [phone]  Fax: [phone]  Follow Up Time: Anatoly [place] (MD)  Clinical Neurophysiology; EEGEpilepsy; Neurology; Sleep Medicine  [address], [address] [place] [address], [place] [address]  Phone: [phone]  Fax: [phone]  Follow Up Time: Routine Michael Moura)  Neurology  [address] [facility] [address] [place] [address]  Phone: [phone]  Fax: [phone]  Follow Up Time: 2 weeks

## 2020-02-29 NOTE — DISCHARGE NOTE PROVIDER - HOSPITAL COURSE
Patient is __ yo M/F with past medical history of _____    Presented with _____, found to have _____        Problem List/Main Diagnoses (system-based):         Inpatient treatment course:         New medications:         Labs to be followed outpatient:         Exam to be followed outpatient: 29 year old, right handed M with hx of epilepsy (?grand mal, ?R temporal lobe focus) who presents as a direct admit for vEEG monitoring and medication adjustment in setting of having seizures on current regimen.        Inpatient treatment course: 28 y/o male presenting for vEEG monitoring. Home dose of Keppra 2g BID slowly decreased. Patient noted to have right temporal spikes on EEG with multiple witnessed seizures with "blanking out". Aptiom increased to 1200mg WD with addition of  Zonegran 50 mg in the morning and 100mg in he evening. Patient was medically optimized, stable and ready for discharge. Plan of care and return precautions were discussed with the patient who verbally stated understanding.        New medications: Aptiom 1200mg QD; Keppra _____, Zonegran ______.        Labs to be followed outpatient: N/A        Exam to be followed outpatient: Comprehensive physical exam        Dispo: Home 29 year old, right handed M with hx of epilepsy (?grand mal, ?R temporal lobe focus) who presents as a direct admit for vEEG monitoring and medication adjustment in setting of having seizures on current regimen.        Inpatient treatment course: 30 y/o male presenting for vEEG monitoring. Home dose of Keppra 2g BID slowly decreased. Patient noted to have right temporal spikes on EEG with multiple witnessed seizures with "blanking out". Aptiom increased to 1200mg WD with addition of  Zonegran 50 mg in the morning and 100mg in he evening. Patient was medically optimized, stable and ready for discharge. Plan of care and return precautions were discussed with the patient who verbally stated understanding.        New medications: Aptiom 1400mg QD; Keppra 500 q12, Zonegran 50 + 100.        Labs to be followed outpatient: N/A        Exam to be followed outpatient: Comprehensive physical exam        Dispo: Home 29 year old, right handed M with hx of epilepsy (?grand mal, ?R temporal lobe focus) who presents as a direct admit for vEEG monitoring and medication adjustment in setting of having seizures on current regimen.        Inpatient treatment course: 28 y/o male presenting for vEEG monitoring. Home dose of Keppra 2g BID slowly decreased. Patient noted to have right temporal spikes on EEG with multiple witnessed seizures with "blanking out". Keppra/levetiracetam decreased from 2000mg bid to 500mg bid, while Aptiom increased from 1000mg/d to 1400mg WD with addition of Zonegran 50 mg in the morning and 100mg in he evening. Patient was medically optimized, stable and ready for discharge. Plan of care and return precautions were discussed with the patient who verbally stated understanding.  He will follow-up with Dr. Moura to continue the cross-titration more slowly as outpatient.        New medications: Aptiom 1400mg QD; Keppra 500 q12, Zonegran 50 + 100.        Labs to be followed outpatient: N/A        Exam to be followed outpatient: Comprehensive physical exam        Dispo: Home

## 2020-02-29 NOTE — PROGRESS NOTE ADULT - SUBJECTIVE AND OBJECTIVE BOX
OVERNIGHT EVENTS: No acute events overnight. Afebrile. VSS. HD stable.   No electrical or clinical events on video EEG. No seizures since 2/27/20 12PM.      SUBJECTIVE / INTERVAL HPI: Patient seen and examined at bedside.   Reports having abdominal pain in the middle of the night, awakening and usually would have at least an aura if not a seizure when awakened early.  No adverse effects on current regimen  ZNS 50/100  CLN 0.5 bid  LEV 750mg bid  Aptiom 1200mg qhs    Review of Systems:  No constitutional, Eyes, ENMT, CV, pulmonary, GI, , skin, musculoskeletal, psychiatric, endocrine, hematological/lymphatic, allergy, or other neurological complaints.    Vital Signs Last 24 Hrs  T(C): 36.8 (29 Feb 2020 16:28), Max: 36.8 (29 Feb 2020 16:28)  T(F): 98.3 (29 Feb 2020 16:28), Max: 98.3 (29 Feb 2020 16:28)  HR: 63 (29 Feb 2020 16:28) (60 - 77)  BP: 133/74 (29 Feb 2020 16:28) (130/70 - 138/78)  BP(mean): --  RR: 14 (29 Feb 2020 16:28) (14 - 18)  SpO2: 96% (29 Feb 2020 16:28) (96% - 99%)    PHYSICAL EXAM:    General: WDWN  HEENT: NC/AT; PERRL, anicteric sclera; MMM  Neck: supple  Neurological: AAOx3; no focal deficits  No pronator drift.      MEDICATIONS:  MEDICATIONS  (STANDING):  clonazePAM  Tablet 0.5 milliGRAM(s) Oral two times a day  eslicarbazepine 1200 milliGRAM(s) Oral daily  levETIRAcetam 750 milliGRAM(s) Oral every 12 hours  zonisamide 100 milliGRAM(s) Oral every 24 hours, 50mg qam.    MEDICATIONS  (PRN):  LORazepam   Injectable 2 milliGRAM(s) IV Push every 4 hours PRN Seizure activity      ALLERGIES:  Allergies    Lamictal (Unknown)    Intolerances        LABS:                        14.2   4.87  )-----------( 198      ( 27 Feb 2020 06:36 )             42.0     02-28    134<L>  |  100  |  12  ----------------------------<  102<H>  4.5   |  23  |  1.01    Ca    8.7      28 Feb 2020 05:27  Mg     2.1     02-28          CAPILLARY BLOOD GLUCOSE          RADIOLOGY & ADDITIONAL TESTS: Reviewed.    ASSESSMENT:    PLAN:

## 2020-03-01 ENCOUNTER — TRANSCRIPTION ENCOUNTER (OUTPATIENT)
Age: 30
End: 2020-03-01

## 2020-03-01 VITALS
DIASTOLIC BLOOD PRESSURE: 77 MMHG | SYSTOLIC BLOOD PRESSURE: 129 MMHG | RESPIRATION RATE: 16 BRPM | HEART RATE: 63 BPM | OXYGEN SATURATION: 99 % | TEMPERATURE: 98 F

## 2020-03-01 LAB
ANION GAP SERPL CALC-SCNC: 12 MMOL/L — SIGNIFICANT CHANGE UP (ref 5–17)
BUN SERPL-MCNC: 11 MG/DL — SIGNIFICANT CHANGE UP (ref 7–23)
CALCIUM SERPL-MCNC: 8.8 MG/DL — SIGNIFICANT CHANGE UP (ref 8.4–10.5)
CHLORIDE SERPL-SCNC: 98 MMOL/L — SIGNIFICANT CHANGE UP (ref 96–108)
CO2 SERPL-SCNC: 21 MMOL/L — LOW (ref 22–31)
CREAT SERPL-MCNC: 0.96 MG/DL — SIGNIFICANT CHANGE UP (ref 0.5–1.3)
GLUCOSE SERPL-MCNC: 92 MG/DL — SIGNIFICANT CHANGE UP (ref 70–99)
HCT VFR BLD CALC: 44.6 % — SIGNIFICANT CHANGE UP (ref 39–50)
HGB BLD-MCNC: 14.9 G/DL — SIGNIFICANT CHANGE UP (ref 13–17)
MAGNESIUM SERPL-MCNC: 2 MG/DL — SIGNIFICANT CHANGE UP (ref 1.6–2.6)
MCHC RBC-ENTMCNC: 28.9 PG — SIGNIFICANT CHANGE UP (ref 27–34)
MCHC RBC-ENTMCNC: 33.4 GM/DL — SIGNIFICANT CHANGE UP (ref 32–36)
MCV RBC AUTO: 86.4 FL — SIGNIFICANT CHANGE UP (ref 80–100)
NRBC # BLD: 0 /100 WBCS — SIGNIFICANT CHANGE UP (ref 0–0)
PLATELET # BLD AUTO: 221 K/UL — SIGNIFICANT CHANGE UP (ref 150–400)
POTASSIUM SERPL-MCNC: 4.2 MMOL/L — SIGNIFICANT CHANGE UP (ref 3.5–5.3)
POTASSIUM SERPL-SCNC: 4.2 MMOL/L — SIGNIFICANT CHANGE UP (ref 3.5–5.3)
RBC # BLD: 5.16 M/UL — SIGNIFICANT CHANGE UP (ref 4.2–5.8)
RBC # FLD: 12.9 % — SIGNIFICANT CHANGE UP (ref 10.3–14.5)
SODIUM SERPL-SCNC: 131 MMOL/L — LOW (ref 135–145)
WBC # BLD: 6.75 K/UL — SIGNIFICANT CHANGE UP (ref 3.8–10.5)
WBC # FLD AUTO: 6.75 K/UL — SIGNIFICANT CHANGE UP (ref 3.8–10.5)

## 2020-03-01 PROCEDURE — G9001: CPT

## 2020-03-01 PROCEDURE — 99238 HOSP IP/OBS DSCHRG MGMT 30/<: CPT

## 2020-03-01 PROCEDURE — 95700 EEG CONT REC W/VID EEG TECH: CPT

## 2020-03-01 PROCEDURE — 83735 ASSAY OF MAGNESIUM: CPT

## 2020-03-01 PROCEDURE — 80177 DRUG SCRN QUAN LEVETIRACETAM: CPT

## 2020-03-01 PROCEDURE — 36415 COLL VENOUS BLD VENIPUNCTURE: CPT

## 2020-03-01 PROCEDURE — 83036 HEMOGLOBIN GLYCOSYLATED A1C: CPT

## 2020-03-01 PROCEDURE — 95716 VEEG EA 12-26HR CONT MNTR: CPT

## 2020-03-01 PROCEDURE — 85027 COMPLETE CBC AUTOMATED: CPT

## 2020-03-01 PROCEDURE — 80048 BASIC METABOLIC PNL TOTAL CA: CPT

## 2020-03-01 PROCEDURE — 95720 EEG PHY/QHP EA INCR W/VEEG: CPT

## 2020-03-01 PROCEDURE — 95713 VEEG 2-12 HR CONT MNTR: CPT

## 2020-03-01 RX ORDER — LEVETIRACETAM 250 MG/1
1 TABLET, FILM COATED ORAL
Qty: 0 | Refills: 0 | DISCHARGE
Start: 2020-03-01

## 2020-03-01 RX ORDER — ZONISAMIDE 100 MG
1 CAPSULE ORAL
Qty: 30 | Refills: 0
Start: 2020-03-01 | End: 2020-03-30

## 2020-03-01 RX ORDER — ESLICARBAZEPINE ACETATE 800 MG/1
7 TABLET ORAL
Qty: 0 | Refills: 0 | DISCHARGE
Start: 2020-03-01

## 2020-03-01 RX ORDER — ZONISAMIDE 100 MG
3 CAPSULE ORAL
Qty: 0 | Refills: 0 | DISCHARGE
Start: 2020-03-01 | End: 2020-03-30

## 2020-03-01 RX ORDER — ESLICARBAZEPINE ACETATE 800 MG/1
1000 TABLET ORAL
Qty: 0 | Refills: 0 | DISCHARGE

## 2020-03-01 RX ORDER — LEVETIRACETAM 250 MG/1
2 TABLET, FILM COATED ORAL
Qty: 0 | Refills: 0 | DISCHARGE

## 2020-03-01 RX ADMIN — Medication 50 MILLIGRAM(S): at 06:13

## 2020-03-01 RX ADMIN — Medication 0.5 MILLIGRAM(S): at 06:13

## 2020-03-01 RX ADMIN — LEVETIRACETAM 500 MILLIGRAM(S): 250 TABLET, FILM COATED ORAL at 10:38

## 2020-03-01 NOTE — DISCHARGE NOTE NURSING/CASE MANAGEMENT/SOCIAL WORK - NSDCPETBCESMAN_GEN_ALL_CORE
If you are a smoker, it is important for your health to stop smoking. Please be aware that second hand smoke is also harmful.
upper

## 2020-03-01 NOTE — DISCHARGE NOTE NURSING/CASE MANAGEMENT/SOCIAL WORK - PATIENT PORTAL LINK FT
You can access the FollowMyHealth Patient Portal offered by [facility] by registering at the following website: [email or web address]. By joining [email or web address] FollowMyHealth portal, you will also be able to view your health information using other applications (apps) compatible with our system.

## 2020-03-02 DIAGNOSIS — Z71.89 OTHER SPECIFIED COUNSELING: ICD-10-CM

## 2020-03-05 DIAGNOSIS — G40.909 EPILEPSY, UNSPECIFIED, NOT INTRACTABLE, WITHOUT STATUS EPILEPTICUS: ICD-10-CM

## 2020-03-05 DIAGNOSIS — Z87.891 PERSONAL HISTORY OF NICOTINE DEPENDENCE: ICD-10-CM

## 2020-03-13 DIAGNOSIS — G40.919 EPILEPSY, UNSPECIFIED, INTRACTABLE, WITHOUT STATUS EPILEPTICUS: ICD-10-CM

## 2020-07-13 ENCOUNTER — RX RENEWAL (OUTPATIENT)
Age: 30
End: 2020-07-13

## 2020-08-04 ENCOUNTER — APPOINTMENT (OUTPATIENT)
Dept: NEUROLOGY | Facility: CLINIC | Age: 30
End: 2020-08-04
Payer: MEDICAID

## 2020-08-04 VITALS
BODY MASS INDEX: 28.95 KG/M2 | DIASTOLIC BLOOD PRESSURE: 83 MMHG | HEIGHT: 68 IN | OXYGEN SATURATION: 98 % | HEART RATE: 91 BPM | WEIGHT: 191 LBS | SYSTOLIC BLOOD PRESSURE: 136 MMHG | TEMPERATURE: 98.2 F

## 2020-08-04 PROCEDURE — 99214 OFFICE O/P EST MOD 30 MIN: CPT

## 2020-08-04 RX ORDER — ESLICARBAZEPINE ACETATE 400 MG/1
400 TABLET ORAL
Refills: 0 | Status: DISCONTINUED | COMMUNITY
End: 2020-08-04

## 2020-08-04 NOTE — HISTORY OF PRESENT ILLNESS
[FreeTextEntry1] : Delfin is a 28 yo, right handed, male. who is here for a follow up for his epilepsy.\par \par Delfin believes the medication is working but thinks sleep is a major trigger to his seizures. He says his roommates do not respect his sleep habits. He smokes a joint to help him fall asleep or if he feels an aura. This seems to help prevent the seizures. \par \par He report three seizures, that he knows of, since his inpatient VEEG admission in February 2020. He does not remember them but knows recognizes the lapse in memory and most people tell him  that he had just had one. Which is an improvement from when we first saw him in February because he was reporting five to six seizures a month if not more. \par \par He wants to be tested syphilis as he believes it could some how be an underlying cause.\par \par Denies any headaches, dizziness or side effects to the medications. \par \par He is currently out of work due but says unemployment has helped him take time to improve his life and get certifications that will help him with jobs in the future. 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 \par \par Current meds: \par Aptiom 1400 mg QD at night \par Keppra 500 mg BID\par Zonegram 150 mg at night\par Klonopin 0.5 mg BID (and an extra tablet as needed)\par *He stopped the CBD oil because he did not enjoy the side effects\par \par PMHx:\par He had his first seizure mid December 2014, the day before that seizure he had been out drinking. His first seizure was described as a grand mal. He states his last large seizure has been October. \par \par Describes sometimes not having a seizure but having a lapse in time where he is tired but nothing happens. Or being on the phone at work and out of no where coming back too and realizing that he has just missed a period of time. \par \belgica Possibly has about 5-6 seizures a month, but they cluster together. Then he can go for 2-3 months without having them. \par \par Aura description:\par Deep anxiety that starts from the head and travels to the pit of the stomach and his hands become sweaty and the next he knows he blacks out.\par Feels that he can provoke an aura. \par \par Trialed meds: \par Lamictal (Intolerance)\par Depakote (Possibly tried depakote unable to remember)

## 2020-08-04 NOTE — DISCUSSION/SUMMARY
[FreeTextEntry1] : Impression: \par 1) Complex partial seizures. He was evaluated in the hospital with VEEG in February 23rd for 5-6 days. Discharged on Aptiom 1400 mg daily, Keppra 500 mg BID, Zonegram 150 mg at night, and klonopin 0.5 mg BID and an extra 0.5 mg for auras. Patient states this regimen is working as he has had a decrease in auras and seizures. \par \par Plan: \par 1) Increase zonisamide to 200 mg daily. \par 2) Check levels\par 3) Amb EEG 48 hour before next visit\par 4) Can take an extra 0.5 mg clonazepam for aura\par 5) RTC in the next 3 months. \par

## 2020-08-05 LAB
ALBUMIN SERPL ELPH-MCNC: 4.7 G/DL
ALP BLD-CCNC: 110 U/L
ALT SERPL-CCNC: 14 U/L
ANION GAP SERPL CALC-SCNC: 9 MMOL/L
AST SERPL-CCNC: 10 U/L
BASOPHILS # BLD AUTO: 0.07 K/UL
BASOPHILS NFR BLD AUTO: 1.4 %
BILIRUB SERPL-MCNC: <0.2 MG/DL
BUN SERPL-MCNC: 16 MG/DL
CALCIUM SERPL-MCNC: 9.2 MG/DL
CHLORIDE SERPL-SCNC: 110 MMOL/L
CO2 SERPL-SCNC: 25 MMOL/L
CREAT SERPL-MCNC: 1.32 MG/DL
EOSINOPHIL # BLD AUTO: 0.15 K/UL
EOSINOPHIL NFR BLD AUTO: 3 %
GLUCOSE SERPL-MCNC: 82 MG/DL
HCT VFR BLD CALC: 43.5 %
HGB BLD-MCNC: 13.6 G/DL
IMM GRANULOCYTES NFR BLD AUTO: 0.2 %
LYMPHOCYTES # BLD AUTO: 1.42 K/UL
LYMPHOCYTES NFR BLD AUTO: 28.9 %
MAN DIFF?: NORMAL
MCHC RBC-ENTMCNC: 29.4 PG
MCHC RBC-ENTMCNC: 31.3 GM/DL
MCV RBC AUTO: 94 FL
MONOCYTES # BLD AUTO: 0.61 K/UL
MONOCYTES NFR BLD AUTO: 12.4 %
NEUTROPHILS # BLD AUTO: 2.66 K/UL
NEUTROPHILS NFR BLD AUTO: 54.1 %
PLATELET # BLD AUTO: 224 K/UL
POTASSIUM SERPL-SCNC: 4.7 MMOL/L
PROT SERPL-MCNC: 7.1 G/DL
RBC # BLD: 4.63 M/UL
RBC # FLD: 13.8 %
RPR SER-TITR: NORMAL
SODIUM SERPL-SCNC: 144 MMOL/L
WBC # FLD AUTO: 4.92 K/UL

## 2020-08-20 LAB
LEVETIRACETAM SERPL-MCNC: 13.9 MCG/ML
OXCARBAZEPINE SERPL-MCNC: 23 UG/ML
ZONISAMIDE SERPL-MCNC: 11 MCG/ML

## 2020-10-14 ENCOUNTER — RX RENEWAL (OUTPATIENT)
Age: 30
End: 2020-10-14

## 2020-11-05 ENCOUNTER — APPOINTMENT (OUTPATIENT)
Dept: NEUROLOGY | Facility: CLINIC | Age: 30
End: 2020-11-05

## 2020-12-02 ENCOUNTER — INPATIENT (INPATIENT)
Facility: HOSPITAL | Age: 30
LOS: 1 days | Discharge: HOME | End: 2020-12-04
Attending: STUDENT IN AN ORGANIZED HEALTH CARE EDUCATION/TRAINING PROGRAM | Admitting: STUDENT IN AN ORGANIZED HEALTH CARE EDUCATION/TRAINING PROGRAM
Payer: MEDICAID

## 2020-12-02 VITALS
OXYGEN SATURATION: 100 % | SYSTOLIC BLOOD PRESSURE: 126 MMHG | HEART RATE: 96 BPM | TEMPERATURE: 97 F | HEIGHT: 69 IN | DIASTOLIC BLOOD PRESSURE: 76 MMHG | RESPIRATION RATE: 20 BRPM

## 2020-12-02 DIAGNOSIS — N44.00 TORSION OF TESTIS, UNSPECIFIED: Chronic | ICD-10-CM

## 2020-12-02 DIAGNOSIS — R56.9 UNSPECIFIED CONVULSIONS: ICD-10-CM

## 2020-12-02 LAB
ALBUMIN SERPL ELPH-MCNC: 4.8 G/DL — SIGNIFICANT CHANGE UP (ref 3.5–5.2)
ALP SERPL-CCNC: 100 U/L — SIGNIFICANT CHANGE UP (ref 30–115)
ALT FLD-CCNC: 11 U/L — SIGNIFICANT CHANGE UP (ref 0–41)
ANION GAP SERPL CALC-SCNC: 10 MMOL/L — SIGNIFICANT CHANGE UP (ref 7–14)
APAP SERPL-MCNC: <5 UG/ML — LOW (ref 10–30)
AST SERPL-CCNC: 8 U/L — SIGNIFICANT CHANGE UP (ref 0–41)
BASOPHILS # BLD AUTO: 0.06 K/UL — SIGNIFICANT CHANGE UP (ref 0–0.2)
BASOPHILS NFR BLD AUTO: 1 % — SIGNIFICANT CHANGE UP (ref 0–1)
BILIRUB SERPL-MCNC: 0.3 MG/DL — SIGNIFICANT CHANGE UP (ref 0.2–1.2)
BUN SERPL-MCNC: 12 MG/DL — SIGNIFICANT CHANGE UP (ref 10–20)
CALCIUM SERPL-MCNC: 9.4 MG/DL — SIGNIFICANT CHANGE UP (ref 8.5–10.1)
CHLORIDE SERPL-SCNC: 102 MMOL/L — SIGNIFICANT CHANGE UP (ref 98–110)
CO2 SERPL-SCNC: 27 MMOL/L — SIGNIFICANT CHANGE UP (ref 17–32)
CREAT SERPL-MCNC: 1.1 MG/DL — SIGNIFICANT CHANGE UP (ref 0.7–1.5)
EOSINOPHIL # BLD AUTO: 0 K/UL — SIGNIFICANT CHANGE UP (ref 0–0.7)
EOSINOPHIL NFR BLD AUTO: 0 % — SIGNIFICANT CHANGE UP (ref 0–8)
ETHANOL SERPL-MCNC: <10 MG/DL — SIGNIFICANT CHANGE UP
GLUCOSE SERPL-MCNC: 118 MG/DL — HIGH (ref 70–99)
HCT VFR BLD CALC: 41.3 % — LOW (ref 42–52)
HGB BLD-MCNC: 14 G/DL — SIGNIFICANT CHANGE UP (ref 14–18)
IMM GRANULOCYTES NFR BLD AUTO: 0.2 % — SIGNIFICANT CHANGE UP (ref 0.1–0.3)
LYMPHOCYTES # BLD AUTO: 0.34 K/UL — LOW (ref 1.2–3.4)
LYMPHOCYTES # BLD AUTO: 5.4 % — LOW (ref 20.5–51.1)
MCHC RBC-ENTMCNC: 29.7 PG — SIGNIFICANT CHANGE UP (ref 27–31)
MCHC RBC-ENTMCNC: 33.9 G/DL — SIGNIFICANT CHANGE UP (ref 32–37)
MCV RBC AUTO: 87.7 FL — SIGNIFICANT CHANGE UP (ref 80–94)
MONOCYTES # BLD AUTO: 0.46 K/UL — SIGNIFICANT CHANGE UP (ref 0.1–0.6)
MONOCYTES NFR BLD AUTO: 7.4 % — SIGNIFICANT CHANGE UP (ref 1.7–9.3)
NEUTROPHILS # BLD AUTO: 5.38 K/UL — SIGNIFICANT CHANGE UP (ref 1.4–6.5)
NEUTROPHILS NFR BLD AUTO: 86 % — HIGH (ref 42.2–75.2)
NRBC # BLD: 0 /100 WBCS — SIGNIFICANT CHANGE UP (ref 0–0)
PLATELET # BLD AUTO: 203 K/UL — SIGNIFICANT CHANGE UP (ref 130–400)
POTASSIUM SERPL-MCNC: 4.4 MMOL/L — SIGNIFICANT CHANGE UP (ref 3.5–5)
POTASSIUM SERPL-SCNC: 4.4 MMOL/L — SIGNIFICANT CHANGE UP (ref 3.5–5)
PROT SERPL-MCNC: 6.8 G/DL — SIGNIFICANT CHANGE UP (ref 6–8)
RAPID RVP RESULT: SIGNIFICANT CHANGE UP
RBC # BLD: 4.71 M/UL — SIGNIFICANT CHANGE UP (ref 4.7–6.1)
RBC # FLD: 13.6 % — SIGNIFICANT CHANGE UP (ref 11.5–14.5)
SALICYLATES SERPL-MCNC: <0.3 MG/DL — LOW (ref 4–30)
SARS-COV-2 RNA SPEC QL NAA+PROBE: SIGNIFICANT CHANGE UP
SODIUM SERPL-SCNC: 139 MMOL/L — SIGNIFICANT CHANGE UP (ref 135–146)
WBC # BLD: 6.25 K/UL — SIGNIFICANT CHANGE UP (ref 4.8–10.8)
WBC # FLD AUTO: 6.25 K/UL — SIGNIFICANT CHANGE UP (ref 4.8–10.8)

## 2020-12-02 PROCEDURE — 99285 EMERGENCY DEPT VISIT HI MDM: CPT

## 2020-12-02 PROCEDURE — 70450 CT HEAD/BRAIN W/O DYE: CPT | Mod: 26

## 2020-12-02 RX ORDER — ZONISAMIDE 100 MG
100 CAPSULE ORAL
Refills: 0 | Status: DISCONTINUED | OUTPATIENT
Start: 2020-12-02 | End: 2020-12-02

## 2020-12-02 RX ORDER — LEVETIRACETAM 250 MG/1
500 TABLET, FILM COATED ORAL ONCE
Refills: 0 | Status: COMPLETED | OUTPATIENT
Start: 2020-12-02 | End: 2020-12-02

## 2020-12-02 RX ORDER — ENOXAPARIN SODIUM 100 MG/ML
40 INJECTION SUBCUTANEOUS DAILY
Refills: 0 | Status: DISCONTINUED | OUTPATIENT
Start: 2020-12-02 | End: 2020-12-04

## 2020-12-02 RX ORDER — ZONISAMIDE 100 MG
50 CAPSULE ORAL DAILY
Refills: 0 | Status: DISCONTINUED | OUTPATIENT
Start: 2020-12-02 | End: 2020-12-03

## 2020-12-02 RX ORDER — ZONISAMIDE 100 MG
100 CAPSULE ORAL ONCE
Refills: 0 | Status: COMPLETED | OUTPATIENT
Start: 2020-12-02 | End: 2020-12-02

## 2020-12-02 RX ORDER — CLONAZEPAM 1 MG
0.5 TABLET ORAL
Refills: 0 | Status: DISCONTINUED | OUTPATIENT
Start: 2020-12-02 | End: 2020-12-03

## 2020-12-02 RX ORDER — ESLICARBAZEPINE ACETATE 800 MG/1
800 TABLET ORAL AT BEDTIME
Refills: 0 | Status: DISCONTINUED | OUTPATIENT
Start: 2020-12-02 | End: 2020-12-03

## 2020-12-02 RX ORDER — CLONAZEPAM 1 MG
0 TABLET ORAL
Qty: 0 | Refills: 0 | DISCHARGE

## 2020-12-02 RX ORDER — CHLORHEXIDINE GLUCONATE 213 G/1000ML
1 SOLUTION TOPICAL
Refills: 0 | Status: DISCONTINUED | OUTPATIENT
Start: 2020-12-02 | End: 2020-12-04

## 2020-12-02 RX ORDER — ZONISAMIDE 100 MG
50 CAPSULE ORAL ONCE
Refills: 0 | Status: COMPLETED | OUTPATIENT
Start: 2020-12-02 | End: 2020-12-02

## 2020-12-02 RX ORDER — ZONISAMIDE 100 MG
50 CAPSULE ORAL
Refills: 0 | Status: DISCONTINUED | OUTPATIENT
Start: 2020-12-02 | End: 2020-12-02

## 2020-12-02 RX ORDER — LEVETIRACETAM 250 MG/1
500 TABLET, FILM COATED ORAL
Refills: 0 | Status: DISCONTINUED | OUTPATIENT
Start: 2020-12-02 | End: 2020-12-03

## 2020-12-02 RX ORDER — ZONISAMIDE 100 MG
300 CAPSULE ORAL AT BEDTIME
Refills: 0 | Status: DISCONTINUED | OUTPATIENT
Start: 2020-12-02 | End: 2020-12-02

## 2020-12-02 RX ADMIN — Medication 50 MILLIGRAM(S): at 20:14

## 2020-12-02 RX ADMIN — Medication 0.5 MILLIGRAM(S): at 18:22

## 2020-12-02 RX ADMIN — Medication 100 MILLIGRAM(S): at 20:14

## 2020-12-02 RX ADMIN — Medication 100 MILLIGRAM(S): at 22:34

## 2020-12-02 RX ADMIN — Medication 2 MILLIGRAM(S): at 12:42

## 2020-12-02 RX ADMIN — LEVETIRACETAM 500 MILLIGRAM(S): 250 TABLET, FILM COATED ORAL at 22:34

## 2020-12-02 RX ADMIN — Medication 50 MILLIGRAM(S): at 22:34

## 2020-12-02 RX ADMIN — LEVETIRACETAM 400 MILLIGRAM(S): 250 TABLET, FILM COATED ORAL at 12:30

## 2020-12-02 RX ADMIN — ESLICARBAZEPINE ACETATE 800 MILLIGRAM(S): 800 TABLET ORAL at 22:34

## 2020-12-02 NOTE — H&P ADULT - NSHPLABSRESULTS_GEN_ALL_CORE
14.0   6.25  )-----------( 203      ( 02 Dec 2020 11:10 )             41.3       12-02    139  |  102  |  12  ----------------------------<  118<H>  4.4   |  27  |  1.1    Ca    9.4      02 Dec 2020 11:10    TPro  6.8  /  Alb  4.8  /  TBili  0.3  /  DBili  x   /  AST  8   /  ALT  11  /  AlkPhos  100  12-02            Lactate Trend          < from: CT Head No Cont (12.02.20 @ 13:11) >      IMPRESSION:    No acute intracranial pathology, stable since 11/11/2019.                RAHEEM PEDERSON M.D., ATTENDING RADIOLOGIST  This document has been electronically signed. Dec  2 2020  1:25PM    < end of copied text >

## 2020-12-02 NOTE — ED PROVIDER NOTE - NS ED ROS FT
Review of Systems    Constitutional: (-) fever, (-) chills  Eyes/ENT: (-) blurry vision, (-) epistaxis, (-) sore throat  Cardiovascular: (-) chest pain, (-) syncope  Respiratory: (-) cough, (-) shortness of breath  Gastrointestinal: (-) pain, (-) nausea, (-) vomiting, (-) diarrhea  Musculoskeletal: (-) neck pain, (-) back pain, (-) body aches  Integumentary: (-) rash, (-) edema  Neurological: (-) headache, (+) seizures  Psychiatric: (-) hallucinations  Allergic/Immunologic: (-) pruritus

## 2020-12-02 NOTE — ED PROVIDER NOTE - PROGRESS NOTE DETAILS
Patient had 3 episodes of 10 seconds of seizure like activity, then back to normal and talking again. AO x3. Patient states he did not take his Keppra today Dr. Shirley accepts admission

## 2020-12-02 NOTE — H&P ADULT - NSHPPHYSICALEXAM_GEN_ALL_CORE
VITALS:     ICU Vital Signs Last 24 Hrs  T(C): 37.2 (02 Dec 2020 15:29), Max: 37.2 (02 Dec 2020 15:29)  T(F): 99 (02 Dec 2020 15:29), Max: 99 (02 Dec 2020 15:29)  HR: 93 (02 Dec 2020 16:03) (70 - 96)  BP: 136/76 (02 Dec 2020 16:00) (101/56 - 136/76)  RR: 18 (02 Dec 2020 16:03) (16 - 20)  SpO2: 100% (02 Dec 2020 16:03) (99% - 100%)      GENERAL: NAD, lying in bed comfortably  HEAD:  Atraumatic, Normocephalic  EYES: EOMI, PERRLA, conjunctiva and sclera clear, no horizontal nystagmus   ENT: Moist mucous membranes  NECK: Supple, No JVD  CHEST/LUNG: Clear to auscultation bilaterally; No rales, rhonchi, wheezing, or rubs. Unlabored respirations  HEART: Regular rate and rhythm; No murmurs, rubs, or gallops  ABDOMEN: Bowel sounds present; Soft, Nontender, Nondistended. No hepatomegally  EXTREMITIES:  2+ Peripheral Pulses, brisk capillary refill. No clubbing, cyanosis, or edema  NERVOUS SYSTEM:  Alert & Oriented X3, speech clear. No deficits   MSK: FROM all 4 extremities, full and equal strength  SKIN: No rashes or lesions

## 2020-12-02 NOTE — ED PROVIDER NOTE - PHYSICAL EXAMINATION
Gen: Alert, NAD, well appearing  Head: NC, AT, PERRL, EOMI, normal lids/conjunctiva  ENT: normal hearing  Neck: +supple, no tenderness/meningismus,  Pulm: Bilateral BS, normal resp effort, no wheeze/stridor/retractions  CV: RRR  Abd: soft, NT/ND  Mskel: no edema/erythema/cyanosis  Skin: no rash, warm/dry  Neuro: AAOx2, confused, slow to follow simple commands

## 2020-12-02 NOTE — ED PROVIDER NOTE - OBJECTIVE STATEMENT
hx from mom Yuly Walsh via phone  29 yo M hx of seizures BIBA from home for seizures. Per mom patient had  a total of 6 seizures lasting 2 minutes each starting at 8:30am - 9:45am which left him confused and weak. hx from mom Yuly Walsh via phone  31 yo M hx of seizures BIBA from home for seizures. Per mom patient had  a total of 6 seizures lasting 2 minutes each starting at 8:30am - 9:45am which left him confused and weak.    patient given Diazepam 5mg IV by EMS

## 2020-12-02 NOTE — ED ADULT NURSE REASSESSMENT NOTE - NS ED NURSE REASSESS COMMENT FT1
While toileting pt., RN was closing curtain to allow for privacy and pt. slid to floor. Reports landing on his backside, denies pain. VSS. AMRIT Pickens at bedside, MD Shirley aware. BP= 136/76, P= 93, SpO2= 100%,

## 2020-12-02 NOTE — ED PROVIDER NOTE - ATTENDING CONTRIBUTION TO CARE
I personally evaluated the patient. I reviewed the Resident’s or Physician Assistant’s note (as assigned above), and agree with the findings and plan except as documented in my note.     30 male here for seizure like activity today with history of epilepsy in the past. Known to neuro with antiepileptic Rx. Unsure of cause but seizures are typical for his pattern only happening in more rapid chronicity.     ROS otherwise unremarkable    PE: male in no distress. CV: pulses intact. CHEST: normal work of breathing. ABD: nondistended. SKIN: normal. EXT: FROM. NEURO: AAO 3. appears post ictal. noted to have bilateral clonic epileptiform movements in the ED with maintenance of consciousness HEENT: nonicteric. EOMI    Impression: seizure activity    Plan: IV labs imaging supportive care and reevaluation

## 2020-12-02 NOTE — ED ADULT NURSE REASSESSMENT NOTE - NS ED NURSE REASSESS COMMENT FT1
Pt. arrived to ED with home meds, 6 bottles inventoried w/pharmacy (1 bottle empty, pharmacy aware).

## 2020-12-02 NOTE — ED PROVIDER NOTE - CLINICAL SUMMARY MEDICAL DECISION MAKING FREE TEXT BOX
30 male here for seizure like activity. Known to have epilepsy. Had screening labs imaging medications and reevaluation, plan is for inpatient admission for continued management.

## 2020-12-02 NOTE — ED ADULT NURSE NOTE - NSIMPLEMENTINTERV_GEN_ALL_ED
Implemented All Fall Risk Interventions:  [place] to call system. Call bell, personal items and telephone within reach. Instruct patient to call for assistance. Room bathroom lighting operational. Non-slip footwear when patient is off stretcher. Physically safe environment: no spills, clutter or unnecessary equipment. Stretcher in lowest position, wheels locked, appropriate side rails in place. Provide visual cue, wrist band, yellow gown, etc. Monitor gait and stability. Monitor for mental status changes and reorient to person, place, and time. Review medications for side effects contributing to fall risk. Reinforce activity limits and safety measures with patient and family.

## 2020-12-02 NOTE — ED ADULT TRIAGE NOTE - CHIEF COMPLAINT QUOTE
BIBA from home as per EMS pt had about 7 witness seizure as per Mother. EMS placed #20 lac and had a witness sz in route. Pt received 5mg of Valium pta BIBA from home as per EMS pt had about 7 witness seizure as per Mother. PT medications were recently changed. EMS placed #20 lac and had a witness sz in route. Pt received 5mg of Valium pta

## 2020-12-02 NOTE — H&P ADULT - PROBLEM SELECTOR PLAN 1
-neurology consult  -VEEG as per ED spoke with neurology   -continue home meds   -keppra level   -magnesium level   -am labs   -urine toxicology

## 2020-12-02 NOTE — ED ADULT NURSE NOTE - CHIEF COMPLAINT QUOTE
BIBA from home as per EMS pt had about 7 witness seizure as per Mother. PT medications were recently changed. EMS placed #20 lac and had a witness sz in route. Pt received 5mg of Valium pta

## 2020-12-02 NOTE — H&P ADULT - HISTORY OF PRESENT ILLNESS
A 29 y/o male with pmhx of seizure 2014 brought in by EMS for seizure . Pt  reports was on his phone this morning when he experienced seizure, multiple times. Pt reports about to fall but his mom caught him. Pt denies hitting his head. Pt denies LOC, pt reports more or less remembers everything. Pt reports compliant with meds , only last night took his med a bit latter than usually. Pt reports smokes weed , daily and believes that helps him with seizure more than the meds. Pt reports cough started just now.  In ED while pt was trying to put his shoes fell on his buttock denies hitting head, loc. Pt denies any body pain. Pt denies biting his tongue.   Pt denies fever, chill, chest pain, shortness of breath, burning with urination.

## 2020-12-03 LAB
ANION GAP SERPL CALC-SCNC: 13 MMOL/L — SIGNIFICANT CHANGE UP (ref 7–14)
BUN SERPL-MCNC: 16 MG/DL — SIGNIFICANT CHANGE UP (ref 10–20)
CALCIUM SERPL-MCNC: 9.6 MG/DL — SIGNIFICANT CHANGE UP (ref 8.5–10.1)
CHLORIDE SERPL-SCNC: 98 MMOL/L — SIGNIFICANT CHANGE UP (ref 98–110)
CO2 SERPL-SCNC: 25 MMOL/L — SIGNIFICANT CHANGE UP (ref 17–32)
CREAT SERPL-MCNC: 1 MG/DL — SIGNIFICANT CHANGE UP (ref 0.7–1.5)
DRUG SCREEN 1, URINE RESULT: SIGNIFICANT CHANGE UP
GLUCOSE SERPL-MCNC: 109 MG/DL — HIGH (ref 70–99)
HCT VFR BLD CALC: 42.3 % — SIGNIFICANT CHANGE UP (ref 42–52)
HGB BLD-MCNC: 14.3 G/DL — SIGNIFICANT CHANGE UP (ref 14–18)
MAGNESIUM SERPL-MCNC: 2.2 MG/DL — SIGNIFICANT CHANGE UP (ref 1.8–2.4)
MCHC RBC-ENTMCNC: 29.5 PG — SIGNIFICANT CHANGE UP (ref 27–31)
MCHC RBC-ENTMCNC: 33.8 G/DL — SIGNIFICANT CHANGE UP (ref 32–37)
MCV RBC AUTO: 87.2 FL — SIGNIFICANT CHANGE UP (ref 80–94)
NRBC # BLD: 0 /100 WBCS — SIGNIFICANT CHANGE UP (ref 0–0)
PLATELET # BLD AUTO: 213 K/UL — SIGNIFICANT CHANGE UP (ref 130–400)
POTASSIUM SERPL-MCNC: 3.7 MMOL/L — SIGNIFICANT CHANGE UP (ref 3.5–5)
POTASSIUM SERPL-SCNC: 3.7 MMOL/L — SIGNIFICANT CHANGE UP (ref 3.5–5)
RBC # BLD: 4.85 M/UL — SIGNIFICANT CHANGE UP (ref 4.7–6.1)
RBC # FLD: 13.7 % — SIGNIFICANT CHANGE UP (ref 11.5–14.5)
SODIUM SERPL-SCNC: 136 MMOL/L — SIGNIFICANT CHANGE UP (ref 135–146)
WBC # BLD: 8.83 K/UL — SIGNIFICANT CHANGE UP (ref 4.8–10.8)
WBC # FLD AUTO: 8.83 K/UL — SIGNIFICANT CHANGE UP (ref 4.8–10.8)

## 2020-12-03 PROCEDURE — 99221 1ST HOSP IP/OBS SF/LOW 40: CPT

## 2020-12-03 PROCEDURE — 99232 SBSQ HOSP IP/OBS MODERATE 35: CPT

## 2020-12-03 RX ORDER — LEVETIRACETAM 250 MG/1
500 TABLET, FILM COATED ORAL
Refills: 0 | Status: DISCONTINUED | OUTPATIENT
Start: 2020-12-03 | End: 2020-12-04

## 2020-12-03 RX ORDER — ESLICARBAZEPINE ACETATE 800 MG/1
1400 TABLET ORAL
Refills: 0 | Status: DISCONTINUED | OUTPATIENT
Start: 2020-12-03 | End: 2020-12-04

## 2020-12-03 RX ORDER — CLONAZEPAM 1 MG
0.5 TABLET ORAL
Refills: 0 | Status: DISCONTINUED | OUTPATIENT
Start: 2020-12-03 | End: 2020-12-04

## 2020-12-03 RX ORDER — ZONISAMIDE 100 MG
300 CAPSULE ORAL
Refills: 0 | Status: DISCONTINUED | OUTPATIENT
Start: 2020-12-03 | End: 2020-12-04

## 2020-12-03 RX ORDER — ESLICARBAZEPINE ACETATE 800 MG/1
400 TABLET ORAL ONCE
Refills: 0 | Status: COMPLETED | OUTPATIENT
Start: 2020-12-03 | End: 2020-12-03

## 2020-12-03 RX ORDER — ZONISAMIDE 100 MG
100 CAPSULE ORAL ONCE
Refills: 0 | Status: COMPLETED | OUTPATIENT
Start: 2020-12-03 | End: 2020-12-03

## 2020-12-03 RX ADMIN — LEVETIRACETAM 500 MILLIGRAM(S): 250 TABLET, FILM COATED ORAL at 21:40

## 2020-12-03 RX ADMIN — Medication 100 MILLIGRAM(S): at 09:29

## 2020-12-03 RX ADMIN — Medication 300 MILLIGRAM(S): at 21:44

## 2020-12-03 RX ADMIN — Medication 0.5 MILLIGRAM(S): at 05:11

## 2020-12-03 RX ADMIN — ESLICARBAZEPINE ACETATE 1400 MILLIGRAM(S): 800 TABLET ORAL at 22:51

## 2020-12-03 RX ADMIN — ESLICARBAZEPINE ACETATE 400 MILLIGRAM(S): 800 TABLET ORAL at 09:29

## 2020-12-03 RX ADMIN — ENOXAPARIN SODIUM 40 MILLIGRAM(S): 100 INJECTION SUBCUTANEOUS at 11:24

## 2020-12-03 RX ADMIN — Medication 0.5 MILLIGRAM(S): at 22:51

## 2020-12-03 RX ADMIN — LEVETIRACETAM 500 MILLIGRAM(S): 250 TABLET, FILM COATED ORAL at 09:29

## 2020-12-03 NOTE — PROGRESS NOTE ADULT - SUBJECTIVE AND OBJECTIVE BOX
REYESCHARLINE  30y, Male  Allergy: Lamictal (Unknown)      OVERNIGHT EVENTS:  Seen and evaluated at bedside,   No acute event overnight     PAST MEDICAL & SURGICAL HISTORY:  Seizures    Left testicular torsion        VITALS:  T(F): 97.3 (12-03-20 @ 05:47), Max: 99 (12-02-20 @ 15:29)  HR: 75 (12-03-20 @ 05:47)  BP: 113/63 (12-03-20 @ 05:47) (101/56 - 147/75)  RR: 18 (12-03-20 @ 05:47)  SpO2: 100% (12-02-20 @ 16:03)    General: WN/WD NAD  Neurology: A&Ox3, nonfocal, BRIGGS x 4  Eyes: PERRLA/ EOMI, Gross vision intact  ENT/Neck: Neck supple, trachea midline, No JVD, Gross hearing intact  Respiratory: CTA B/L, No wheezing, rales, rhonchi  CV: RRR, S1S2, no murmurs, rubs or gallops  Abdominal: Soft, NT, ND +BS,   Extremities: No edema, + peripheral pulses  Skin: No Rashes, Hematoma, Ecchymosis      TESTS & MEASUREMENTS:  Height (cm): 172.7 (12-02-20 @ 16:47)  Weight (kg): 87.6 (12-02-20 @ 16:47)  BMI (kg/m2): 29.4 (12-02-20 @ 16:47)                          14.0   6.25  )-----------( 203      ( 02 Dec 2020 11:10 )             41.3       12-02    139  |  102  |  12  ----------------------------<  118<H>  4.4   |  27  |  1.1    Ca    9.4      02 Dec 2020 11:10    TPro  6.8  /  Alb  4.8  /  TBili  0.3  /  DBili  x   /  AST  8   /  ALT  11  /  AlkPhos  100  12-02    LIVER FUNCTIONS - ( 02 Dec 2020 11:10 )  Alb: 4.8 g/dL / Pro: 6.8 g/dL / ALK PHOS: 100 U/L / ALT: 11 U/L / AST: 8 U/L / GGT: x                     RADIOLOGY & ADDITIONAL TESTS:    MEDICATIONS:  MEDICATIONS  (STANDING):  chlorhexidine 4% Liquid 1 Application(s) Topical <User Schedule>  clonazePAM  Tablet 0.5 milliGRAM(s) Oral <User Schedule>  enoxaparin Injectable 40 milliGRAM(s) SubCutaneous daily  eslicarbazepine 1400 milliGRAM(s) Oral <User Schedule>  levETIRAcetam 500 milliGRAM(s) Oral <User Schedule>  zonisamide 300 milliGRAM(s) Oral <User Schedule>    MEDICATIONS  (PRN):  LORazepam   Injectable 2 milliGRAM(s) IV Push three times a day PRN generalized tonic-clonic seizure lasting longer than 2 minutes, or two consecutive seizures iwthout return to baseline in-between      HEALTH ISSUES - PROBLEM Dx:  Seizure  Seizure

## 2020-12-03 NOTE — CHART NOTE - NSCHARTNOTEFT_GEN_A_CORE
Medicine   AMRIT Rodriguez  Spectra: [phone]    CHARLINE CHAMBERS  Patient is a 30y old  Male who presents with a chief complaint of seizure (02 Dec 2020 16:17)      INTERVAL HPI/OVERNIGHT EVENTS:  No Acute Medical issues overnight. Patient at bedside without any new medical complaints.  Patient states he was brought in yesterday and had seizure at home witnessed by family.  Patient states has history of seizure   disorder and followed by his Neurologist.   Patient states he didn't sleep well overnight, due to anxiety of  having another seizure.         REVIEW OF SYSTEMS:  CONSTITUTIONAL: Denies chills, or weakness.  EYES: No eye pain, visual disturbances, or discharge  ENT:  Denies complaints of ear, nose, sinus, or throat pain.  NECK: Denies complaints of pain  RESPIRATORY: No cough, wheezing, chills or hemoptysis; No shortness of breath  CARDIOVASCULAR: No chest pain, palpitations, dizziness, or leg swelling  GASTROINTESTINAL: No abdominal or epigastric pain. No nausea, vomiting, or hematemesis; No diarrhea or constipation. No melena or hematochezia.  GENITOURINARY: No dysuria, frequency, hematuria, or incontinence  NEUROLOGICAL: No headaches, No new weakness or numbness.  SKIN: No itching, or burning, and no rash.   MUSCULOSKELETAL: Denies any complaints of new pain.  PSYCHIATRIC: Denies any mood change or complaints.    T(C): 36.3 (12-03-20 @ 05:47), Max: 37.2 (12-02-20 @ 15:29)  HR: 75 (12-03-20 @ 05:47) (70 - 96)  BP: 113/63 (12-03-20 @ 05:47) (101/56 - 147/75)  RR: 18 (12-03-20 @ 05:47) (16 - 20)  SpO2: 100% (12-02-20 @ 16:03) (99% - 100%)    PHYSICAL EXAM:  GENERAL: NAD  EYES: EOMI, PERRLA, conjunctiva and sclera clear  ENT: No tonsillar erythema, exudates, or enlargement; no change  NERVOUS SYSTEM:  Alert & Oriented X3, Grossly intact.  CHEST/LUNG: Clear to percussion bilaterally; No rales, rhonchi, wheezing, or rubs  HEART: Regular rate and rhythm  ABDOMEN: Soft, Nontender, Nondistended; Bowel sounds present  EXTREMITIES:  2+ Peripheral Pulses, unchanged    Consultant(s) Notes Reviewed:  [x ] YES  [ ] NO    LABS:                        14.0   6.25  )-----------( 203      ( 02 Dec 2020 11:10 )             41.3     12-02    139  |  102  |  12  ----------------------------<  118<H>  4.4   |  27  |  1.1    Ca    9.4      02 Dec 2020 11:10    TPro  6.8  /  Alb  4.8  /  TBili  0.3  /  DBili  x   /  AST  8   /  ALT  11  /  AlkPhos  100  12-02    LIVER FUNCTIONS - ( 02 Dec 2020 11:10 )  Alb: 4.8 g/dL / Pro: 6.8 g/dL / ALK PHOS: 100 U/L / ALT: 11 U/L / AST: 8 U/L / GGT: x             HEALTH ISSUES - PROBLEM Dx:  Seizure      MEDS:  chlorhexidine 4% Liquid 1 Application(s) Topical <User Schedule>  clonazePAM  Tablet 0.5 milliGRAM(s) Oral two times a day  enoxaparin Injectable 40 milliGRAM(s) SubCutaneous daily  eslicarbazepine 1400 milliGRAM(s) Oral <User Schedule>  levETIRAcetam 500 milliGRAM(s) Oral <User Schedule>  LORazepam   Injectable 2 milliGRAM(s) IV Push three times a day PRN  zonisamide 300 milliGRAM(s) Oral <User Schedule>            Assessment and Plan:   · Assessment	    - Seizure DO    Plan:     -neurology following  - VEEG   - continue home meds   - keppra level   - repeat labs  - urine toxicology Medicine   AMRIT Rodriguez  Spectra: [phone]    CHARLINE CHAMBERS  Patient is a 30y old  Male who presents with a chief complaint of seizure (02 Dec 2020 16:17)      INTERVAL HPI/OVERNIGHT EVENTS:  No Acute Medical issues overnight. Patient at bedside without any new medical complaints.  Patient states he was brought in yesterday and had seizure at home witnessed by family.  Patient states has history of seizure   disorder and followed by his Neurologist.   Patient states he didn't sleep well overnight, due to anxiety of  having another seizure.         REVIEW OF SYSTEMS:  CONSTITUTIONAL: Denies chills, or weakness.  EYES: No eye pain, visual disturbances, or discharge  ENT:  Denies complaints of ear, nose, sinus, or throat pain.  NECK: Denies complaints of pain  RESPIRATORY: No cough, wheezing, chills or hemoptysis; No shortness of breath  CARDIOVASCULAR: No chest pain, palpitations, dizziness, or leg swelling  GASTROINTESTINAL: No abdominal or epigastric pain. No nausea, vomiting, or hematemesis; No diarrhea or constipation. No melena or hematochezia.  GENITOURINARY: No dysuria, frequency, hematuria, or incontinence  NEUROLOGICAL: No headaches, No new weakness or numbness.  SKIN: No itching, or burning, and no rash.   MUSCULOSKELETAL: Denies any complaints of new pain.  PSYCHIATRIC: Denies any mood change or complaints.    T(C): 36.3 (12-03-20 @ 05:47), Max: 37.2 (12-02-20 @ 15:29)  HR: 75 (12-03-20 @ 05:47) (70 - 96)  BP: 113/63 (12-03-20 @ 05:47) (101/56 - 147/75)  RR: 18 (12-03-20 @ 05:47) (16 - 20)  SpO2: 100% (12-02-20 @ 16:03) (99% - 100%)    PHYSICAL EXAM:  GENERAL: NAD  EYES: EOMI, PERRLA, conjunctiva and sclera clear  ENT: No tonsillar erythema, exudates, or enlargement; no change  NERVOUS SYSTEM:  Alert & Oriented X3, Grossly intact.  CHEST/LUNG: Clear to percussion bilaterally; No rales, rhonchi, wheezing, or rubs  HEART: Regular rate and rhythm  ABDOMEN: Soft, Nontender, Nondistended; Bowel sounds present  EXTREMITIES:  2+ Peripheral Pulses, unchanged    Consultant(s) Notes Reviewed:  [x ] YES  [ ] NO    LABS:                        14.0   6.25  )-----------( 203      ( 02 Dec 2020 11:10 )             41.3     12-02    139  |  102  |  12  ----------------------------<  118<H>  4.4   |  27  |  1.1    Ca    9.4      02 Dec 2020 11:10    TPro  6.8  /  Alb  4.8  /  TBili  0.3  /  DBili  x   /  AST  8   /  ALT  11  /  AlkPhos  100  12-02    LIVER FUNCTIONS - ( 02 Dec 2020 11:10 )  Alb: 4.8 g/dL / Pro: 6.8 g/dL / ALK PHOS: 100 U/L / ALT: 11 U/L / AST: 8 U/L / GGT: x             HEALTH ISSUES - PROBLEM Dx:  Seizure      MEDS:  chlorhexidine 4% Liquid 1 Application(s) Topical <User Schedule>  clonazePAM  Tablet 0.5 milliGRAM(s) Oral two times a day  enoxaparin Injectable 40 milliGRAM(s) SubCutaneous daily  eslicarbazepine 1400 milliGRAM(s) Oral <User Schedule>  levETIRAcetam 500 milliGRAM(s) Oral <User Schedule>  LORazepam   Injectable 2 milliGRAM(s) IV Push three times a day PRN  zonisamide 300 milliGRAM(s) Oral <User Schedule>            Assessment and Plan:   · Assessment	    - Seizure DO  - Vomiting    Plan:    - Patient vomited while eating breakfast / patient was overeating witnessed by me   -neurology following  - VEEG   - continue home meds   - keppra level   - repeat labs  - urine toxicology Medicine   AMRIT Rodriguez  Spectra: [phone]    CHARLINE CHAMBERS  Patient is a 30y old  Male who presents with a chief complaint of seizure (02 Dec 2020 16:17)      INTERVAL HPI/OVERNIGHT EVENTS:  No Acute Medical issues overnight. Patient at bedside without any new medical complaints.  Patient states he was brought in yesterday and had seizure at home witnessed by family.  Patient states has history of seizure   disorder and followed by his Neurologist.   Patient states he didn't sleep well overnight, due to anxiety of  having another seizure.         REVIEW OF SYSTEMS:  CONSTITUTIONAL: Denies chills, or weakness.  EYES: No eye pain, visual disturbances, or discharge  ENT:  Denies complaints of ear, nose, sinus, or throat pain.  NECK: Denies complaints of pain  RESPIRATORY: No cough, wheezing, chills or hemoptysis; No shortness of breath  CARDIOVASCULAR: No chest pain, palpitations, dizziness, or leg swelling  GASTROINTESTINAL: No abdominal or epigastric pain. No nausea, vomiting, or hematemesis; No diarrhea or constipation. No melena or hematochezia.  GENITOURINARY: No dysuria, frequency, hematuria, or incontinence  NEUROLOGICAL: No headaches, No new weakness or numbness.  SKIN: No itching, or burning, and no rash.   MUSCULOSKELETAL: Denies any complaints of new pain.  PSYCHIATRIC: Denies any mood change or complaints.    T(C): 36.3 (12-03-20 @ 05:47), Max: 37.2 (12-02-20 @ 15:29)  HR: 75 (12-03-20 @ 05:47) (70 - 96)  BP: 113/63 (12-03-20 @ 05:47) (101/56 - 147/75)  RR: 18 (12-03-20 @ 05:47) (16 - 20)  SpO2: 100% (12-02-20 @ 16:03) (99% - 100%)    PHYSICAL EXAM:  GENERAL: NAD  EYES: EOMI, PERRLA, conjunctiva and sclera clear  ENT: No tonsillar erythema, exudates, or enlargement; no change  NERVOUS SYSTEM:  Alert & Oriented X3, Grossly intact.  CHEST/LUNG: Clear to percussion bilaterally; No rales, rhonchi, wheezing, or rubs  HEART: Regular rate and rhythm  ABDOMEN: Soft, Nontender, Nondistended; Bowel sounds present  EXTREMITIES:  2+ Peripheral Pulses, unchanged    Consultant(s) Notes Reviewed:  [x ] YES  [ ] NO    LABS:                        14.0   6.25  )-----------( 203      ( 02 Dec 2020 11:10 )             41.3     12-02    139  |  102  |  12  ----------------------------<  118<H>  4.4   |  27  |  1.1    Ca    9.4      02 Dec 2020 11:10    TPro  6.8  /  Alb  4.8  /  TBili  0.3  /  DBili  x   /  AST  8   /  ALT  11  /  AlkPhos  100  12-02    LIVER FUNCTIONS - ( 02 Dec 2020 11:10 )  Alb: 4.8 g/dL / Pro: 6.8 g/dL / ALK PHOS: 100 U/L / ALT: 11 U/L / AST: 8 U/L / GGT: x             HEALTH ISSUES - PROBLEM Dx:  Seizure      MEDS:  chlorhexidine 4% Liquid 1 Application(s) Topical <User Schedule>  clonazePAM  Tablet 0.5 milliGRAM(s) Oral two times a day  enoxaparin Injectable 40 milliGRAM(s) SubCutaneous daily  eslicarbazepine 1400 milliGRAM(s) Oral <User Schedule>  levETIRAcetam 500 milliGRAM(s) Oral <User Schedule>  LORazepam   Injectable 2 milliGRAM(s) IV Push three times a day PRN  zonisamide 300 milliGRAM(s) Oral <User Schedule>            Assessment and Plan:   · Assessment	    - Seizure DO  - Vomiting    Plan:    - Patient vomited while eating breakfast / patient was overeating witnessed by me   -neurology following  - VEEG   - continue home meds   - keppra level   - repeat labs  - urine toxicology      I have performed a history and physical exam of this patient and discussed the management with ACP. I have reviewed the resident note and agree with the documented findings and plan of care.

## 2020-12-03 NOTE — PHARMACOTHERAPY INTERVENTION NOTE - COMMENTS
As per NP Tristen patient on Aptiom 1400mg q24h from home ordered by neurology. Dose was confirmed by Trice from outside Pharmacy and neurology.

## 2020-12-03 NOTE — PROGRESS NOTE ADULT - ASSESSMENT
Assessment and Plan  Seizure Disorder  -Seizure precaution  -Neurology consult for VEEG   -Ativan prn  - Continue home meds   - keppra level   - repeat labs  - urine toxicology.      #Progress Note Handoff  Pending (specify):  Consults_Neuro following  Tests_VEEG  Family discussion: N/A  Disposition: Home_X__/SNF___/Other________/Unknown at this time________

## 2020-12-03 NOTE — CONSULT NOTE ADULT - ATTENDING COMMENTS
Agree with the Hx and plan  The precipitating cause is not clear  will check with his neurologist and the pharmacy the compliance  The medication receieved so far in the hospital is not what he is supposed to be taking based on the information ngathered so far  will start the monitoring until back to the baseline  seizure precaution

## 2020-12-04 ENCOUNTER — TRANSCRIPTION ENCOUNTER (OUTPATIENT)
Age: 30
End: 2020-12-04

## 2020-12-04 VITALS
HEART RATE: 57 BPM | DIASTOLIC BLOOD PRESSURE: 78 MMHG | SYSTOLIC BLOOD PRESSURE: 127 MMHG | RESPIRATION RATE: 18 BRPM | TEMPERATURE: 98 F

## 2020-12-04 LAB
SARS-COV-2 IGG SERPL QL IA: NEGATIVE — SIGNIFICANT CHANGE UP
SARS-COV-2 IGM SERPL IA-ACNC: 0 INDEX — SIGNIFICANT CHANGE UP

## 2020-12-04 PROCEDURE — 99231 SBSQ HOSP IP/OBS SF/LOW 25: CPT

## 2020-12-04 PROCEDURE — 99238 HOSP IP/OBS DSCHRG MGMT 30/<: CPT

## 2020-12-04 PROCEDURE — 95720 EEG PHY/QHP EA INCR W/VEEG: CPT

## 2020-12-04 RX ORDER — ESLICARBAZEPINE ACETATE 800 MG/1
1 TABLET ORAL
Qty: 0 | Refills: 0 | DISCHARGE

## 2020-12-04 RX ORDER — ZONISAMIDE 100 MG
3 CAPSULE ORAL
Qty: 0 | Refills: 0 | DISCHARGE
Start: 2020-12-04

## 2020-12-04 RX ORDER — ZONISAMIDE 100 MG
100 CAPSULE ORAL ONCE
Refills: 0 | Status: COMPLETED | OUTPATIENT
Start: 2020-12-04 | End: 2020-12-04

## 2020-12-04 RX ORDER — ESLICARBAZEPINE ACETATE 800 MG/1
7 TABLET ORAL
Qty: 0 | Refills: 0 | DISCHARGE
Start: 2020-12-04

## 2020-12-04 RX ADMIN — ENOXAPARIN SODIUM 40 MILLIGRAM(S): 100 INJECTION SUBCUTANEOUS at 10:38

## 2020-12-04 RX ADMIN — Medication 0.5 MILLIGRAM(S): at 08:03

## 2020-12-04 RX ADMIN — Medication 100 MILLIGRAM(S): at 10:37

## 2020-12-04 RX ADMIN — LEVETIRACETAM 500 MILLIGRAM(S): 250 TABLET, FILM COATED ORAL at 08:03

## 2020-12-04 NOTE — DISCHARGE NOTE PROVIDER - NSCORESITESY/N_GEN_A_CORE_RD
----- Message from Leah Pablo MA sent at 10/22/2020 10:48 AM CDT -----    ----- Message -----  From: Abby Smith MD  Sent: 10/22/2020  10:33 AM CDT  To: [facility] Nurse Msg Pool    Thyroid nodule.  Will repeat thyroid ultrasound in 1 year     No

## 2020-12-04 NOTE — DISCHARGE NOTE NURSING/CASE MANAGEMENT/SOCIAL WORK - PATIENT PORTAL LINK FT
You can access the FollowMyHealth Patient Portal offered by [facility] [place] by registering at the following website: [email or web address]. By joining [employer]’s FollowMyHealth portal, you will also be able to view your health information using other applications (apps) compatible with our system.

## 2020-12-04 NOTE — PROGRESS NOTE ADULT - ASSESSMENT
ASSESSMENT:  Pt is a 30M admitted for breakthrough seizures on VEEG.     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  - Additional dose of zonisamide given this morning  - No other medication changes   - Patient for d/c today, with neuro follow up       Progress Note Handoff  Pending Consults:  Pending Tests:  Pending Results:  Family Discussion:  Disposition: Home__x___/SNF______/Other_____/Unknown at this time_____

## 2020-12-04 NOTE — PROGRESS NOTE ADULT - ATTENDING COMMENTS
I have performed a history and physical exam of this patient and discussed the management with ACP.   I have reviewed the resident note and agree with the documented findings and plan of care.

## 2020-12-04 NOTE — DISCHARGE NOTE PROVIDER - CARE PROVIDER_API CALL
Anatoly Gordon J  CLINICAL NEUROPHYSIOLOGY  [address], [address] [place] [address], [place] [facility]  Phone: [phone]  Fax: [phone]  Follow Up Time: Routine

## 2020-12-04 NOTE — DISCHARGE NOTE PROVIDER - HOSPITAL COURSE
Pt is a 30M admitted for breakthrough seizures on VEEG.     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  - Additional dose of zonisamide given this morning  - No other medication changes   - Patient for d/c today, with neuro follow up Pt is a 30M admitted for breakthrough seizures on VEEG. On admission his plan of care is as shown below:    # Epilepsy  - Seizure precautions  - Neurology consulted for VEEG monitoring and medication recommendation. 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  - Additional dose of zonisamide given this morning before discharge  - No other medication changes   - Patient for d/c today, with neuro follow up

## 2020-12-04 NOTE — DISCHARGE NOTE PROVIDER - NSDCCPCAREPLAN_GEN_ALL_CORE_FT
PRINCIPAL DISCHARGE DIAGNOSIS  Diagnosis: Seizure  Assessment and Plan of Treatment: Known epilepsy - breakthrough seizure 2/2 missed medication dosages  No medication changes at this time  Patient should follow up with his neurologist, Dr. Gordon, routinely.

## 2020-12-04 NOTE — DISCHARGE NOTE PROVIDER - NSDCMRMEDTOKEN_GEN_ALL_CORE_FT
eslicarbazepine 200 mg oral tablet: 7 tab(s) orally once a day (at bedtime)  KlonoPIN 0.5 mg oral tablet: 1 tab(s) orally 2 times a day  levETIRAcetam 500 mg oral tablet: 1 tab(s) orally every 12 hours  zonisamide 100 mg oral capsule: 3 cap(s) orally once a day (at bedtime)

## 2020-12-04 NOTE — PROGRESS NOTE ADULT - SUBJECTIVE AND OBJECTIVE BOX
CHARLINE CHAMBERS  30y  Male      Patient is a 30y old  Male who presents with a chief complaint of seizure (04 Dec 2020 11:18)      INTERVAL HPI/OVERNIGHT EVENTS:  Patient seen and examined earlier this morning at bedside, connected to VEEG. No Complaints. No overnight events. 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 Extra dose of zonisamide this AM. No other medication changes.         T(C): 36.4 (12-04-20 @ 04:38), Max: 37.3 (12-03-20 @ 14:33)  HR: 57 (12-04-20 @ 04:38) (57 - 72)  BP: 127/78 (12-04-20 @ 04:38) (113/64 - 127/78)  RR: 18 (12-04-20 @ 04:38) (18 - 18)  SpO2: --    PHYSICAL EXAM:  GENERAL: NAD, AOx3  NERVOUS SYSTEM:  Alert & Oriented X3, Good concentration; Motor Strength 5/5 B/L upper and lower extremities; DTRs 2+ intact and symmetric  CHEST/LUNG: Clear to percussion bilaterally; No rales, rhonchi, wheezing, or rubs  HEART: Regular rate and rhythm; No murmurs, rubs, or gallops  ABDOMEN: Soft, Nontender, Nondistended; Bowel sounds present  EXTREMITIES:  2+ Peripheral Pulses, No clubbing, cyanosis, or edema      Consultant(s) Notes Reviewed:  [x ] YES  [ ] NO  Care Discussed with Consultants/Other Providers [ x] YES  [ ] NO    LAB:                        14.3   8.83  )-----------( 213      ( 03 Dec 2020 11:29 )             42.3     12-03    136  |  98  |  16  ----------------------------<  109<H>  3.7   |  25  |  1.0    Ca    9.6      03 Dec 2020 11:29  Mg     2.2     12-03                    Drug Dosing Weight  Height (cm): 172.7 (02 Dec 2020 16:47)  Weight (kg): 87.6 (02 Dec 2020 16:47)  BMI (kg/m2): 29.4 (02 Dec 2020 16:47)  BSA (m2): 2.01 (02 Dec 2020 16:47)    CAPILLARY BLOOD GLUCOSE        I&O's Summary        RADIOLOGY & ADDITIONAL TESTS:  Imaging Personally Reviewed:  [x] YES  [ ] NO      HEALTH ISSUES - PROBLEM Dx:  Epilepsy            MEDS:  chlorhexidine 4% Liquid 1 Application(s) Topical <User Schedule>  clonazePAM  Tablet 0.5 milliGRAM(s) Oral <User Schedule>  enoxaparin Injectable 40 milliGRAM(s) SubCutaneous daily  eslicarbazepine 1400 milliGRAM(s) Oral <User Schedule>  levETIRAcetam 500 milliGRAM(s) Oral <User Schedule>  LORazepam   Injectable 2 milliGRAM(s) IV Push three times a day PRN  zonisamide 300 milliGRAM(s) Oral <User Schedule>

## 2020-12-04 NOTE — PROGRESS NOTE ADULT - SUBJECTIVE AND OBJECTIVE BOX
Epilepsy Attending Note:     REYESCHARLINE    30y Male  MRN [id number]    Vital Signs Last 24 Hrs  T(C): 36.4 (04 Dec 2020 04:38), Max: 37.3 (03 Dec 2020 14:33)  T(F): 97.6 (04 Dec 2020 04:38), Max: 99.2 (03 Dec 2020 14:33)  HR: 57 (04 Dec 2020 04:38) (57 - 72)  BP: 127/78 (04 Dec 2020 04:38) (113/64 - 127/78)  BP(mean): --  RR: 18 (04 Dec 2020 04:38) (18 - 18)  SpO2: --                          14.3   8.83  )-----------( 213      ( 03 Dec 2020 11:29 )             42.3       12-03    136  |  98  |  16  ----------------------------<  109<H>  3.7   |  25  |  1.0    Ca    9.6      03 Dec 2020 11:29  Mg     2.2     12-03        MEDICATIONS  (STANDING):  chlorhexidine 4% Liquid 1 Application(s) Topical <User Schedule>  clonazePAM  Tablet 0.5 milliGRAM(s) Oral <User Schedule>  enoxaparin Injectable 40 milliGRAM(s) SubCutaneous daily  eslicarbazepine 1400 milliGRAM(s) Oral <User Schedule>  levETIRAcetam 500 milliGRAM(s) Oral <User Schedule>  zonisamide 300 milliGRAM(s) Oral <User Schedule>    MEDICATIONS  (PRN):  LORazepam   Injectable 2 milliGRAM(s) IV Push three times a day PRN generalized tonic-clonic seizure lasting longer than 2 minutes, or two consecutive seizures iwthout return to baseline in-between            VEEG in the last 24 hours:    Background----------------well organized . symmetrical , reactive reaching frequencies in the range of 8-9 hz    Focal and generalized slowing-- right hemispheric focal slowing mainly Ft    Interictal activity-------- small to moderate number of right FT sharps at times presented as brief , few seconds periodic activity    Events----- none    Seizures----- none    Impression: abnormal as above.                    Rt . hemispheric partial epilepsy  with breakthrough seizures . ?? secondary to missing Klonipin doses    Plan -  extra 100 mg of ZNZS now           DC on the home doses           discussed in extend the precipitating causes

## 2020-12-05 LAB
ZONISAMIDE SERPL-MCNC: 19 MCG/ML — SIGNIFICANT CHANGE UP (ref 10–40)
ZONISAMIDE SERPL-MCNC: 21 MCG/ML — SIGNIFICANT CHANGE UP (ref 10–40)

## 2020-12-07 DIAGNOSIS — Z88.8 ALLERGY STATUS TO OTHER DRUGS, MEDICAMENTS AND BIOLOGICAL SUBSTANCES: ICD-10-CM

## 2020-12-07 DIAGNOSIS — G40.802 OTHER EPILEPSY, NOT INTRACTABLE, WITHOUT STATUS EPILEPTICUS: ICD-10-CM

## 2020-12-07 DIAGNOSIS — R56.9 UNSPECIFIED CONVULSIONS: ICD-10-CM

## 2020-12-07 LAB
LEVETIRACETAM SERPL-MCNC: 12.7 UG/ML — SIGNIFICANT CHANGE UP (ref 10–40)
LEVETIRACETAM SERPL-MCNC: 5.7 UG/ML — LOW (ref 10–40)
OXCARBAZEPINE SERPL-MCNC: 13 UG/ML — SIGNIFICANT CHANGE UP (ref 10–35)

## 2021-01-05 NOTE — DISCHARGE NOTE NURSING/CASE MANAGEMENT/SOCIAL WORK - NSDCPEPTCAREGIVEDUMATLIST _GEN_ALL_CORE
Please inform the patient that I reviewed the readings, he should continue on present medications   Influenza Vaccination

## 2021-01-28 ENCOUNTER — APPOINTMENT (OUTPATIENT)
Dept: NEUROLOGY | Facility: CLINIC | Age: 31
End: 2021-01-28
Payer: MEDICAID

## 2021-01-28 VITALS
HEIGHT: 68 IN | BODY MASS INDEX: 30.16 KG/M2 | SYSTOLIC BLOOD PRESSURE: 145 MMHG | WEIGHT: 199 LBS | OXYGEN SATURATION: 98 % | HEART RATE: 86 BPM | TEMPERATURE: 98.6 F | DIASTOLIC BLOOD PRESSURE: 74 MMHG

## 2021-01-28 PROCEDURE — 99213 OFFICE O/P EST LOW 20 MIN: CPT

## 2021-01-28 PROCEDURE — 99072 ADDL SUPL MATRL&STAF TM PHE: CPT

## 2021-02-04 ENCOUNTER — FORM ENCOUNTER (OUTPATIENT)
Age: 31
End: 2021-02-04

## 2021-02-08 LAB
A-TUMOR NECROSIS FACT SERPL-MCNC: <1.7 PG/ML
ALBUMIN SERPL ELPH-MCNC: 4.5 G/DL
ALP BLD-CCNC: 95 U/L
ALT SERPL-CCNC: 15 U/L
ANA SER IF-ACNC: NEGATIVE
ANION GAP SERPL CALC-SCNC: 13 MMOL/L
AST SERPL-CCNC: 9 U/L
B2 GLYCOPROT1 AB SER QL: NEGATIVE
BASOPHILS # BLD AUTO: 0.07 K/UL
BASOPHILS NFR BLD AUTO: 1.1 %
BILIRUB SERPL-MCNC: <0.2 MG/DL
BUN SERPL-MCNC: 16 MG/DL
CALCIUM SERPL-MCNC: 9 MG/DL
CHLORIDE SERPL-SCNC: 106 MMOL/L
CK SERPL-CCNC: 279 U/L
CO2 SERPL-SCNC: 21 MMOL/L
CREAT SERPL-MCNC: 1.07 MG/DL
CRP SERPL-MCNC: 0.14 MG/DL
EOSINOPHIL # BLD AUTO: 0.24 K/UL
EOSINOPHIL NFR BLD AUTO: 3.9 %
ERYTHROCYTE [SEDIMENTATION RATE] IN BLOOD BY WESTERGREN METHOD: 2 MM/HR
ESTIMATED AVERAGE GLUCOSE: 103 MG/DL
FOLATE SERPL-MCNC: 10.6 NG/ML
GAD65 AB SER-MCNC: 0.05 NMOL/L
GLUCOSE SERPL-MCNC: 100 MG/DL
HBA1C MFR BLD HPLC: 5.2 %
HCT VFR BLD CALC: 41.9 %
HGB BLD-MCNC: 13.8 G/DL
IGNF SERPL-MCNC: <4.2 PG/ML
IL10 SERPL-MCNC: <2.8 PG/ML
IL12 SERPL-MCNC: <1.9 PG/ML
IL13 SERPL-MCNC: <1.7 PG/ML
IL17A SERPL-MCNC: <1.4 PG/ML
IL2 SERPL-MCNC: 365 PG/ML
IL2 SERPL-MCNC: <2.1 PG/ML
IL4 SERPL-MCNC: <2.2 PG/ML
IL6 SERPL-MCNC: <2 PG/ML
IL8 SERPL-MCNC: <3 PG/ML
IMM GRANULOCYTES NFR BLD AUTO: 0.3 %
INTERLEUKIN 1 BETA: <6.5 PG/ML
INTERLEUKIN 5: <2.1 PG/ML
LEVETIRACETAM SERPL-MCNC: 7.5 UG/ML
LYMPHOCYTES # BLD AUTO: 1.26 K/UL
LYMPHOCYTES NFR BLD AUTO: 20.7 %
MAGNESIUM SERPL-MCNC: 2 MG/DL
MAN DIFF?: NORMAL
MCHC RBC-ENTMCNC: 29.8 PG
MCHC RBC-ENTMCNC: 32.9 GM/DL
MCV RBC AUTO: 90.5 FL
MONOCYTES # BLD AUTO: 0.85 K/UL
MONOCYTES NFR BLD AUTO: 14 %
NEUTROPHILS # BLD AUTO: 3.65 K/UL
NEUTROPHILS NFR BLD AUTO: 60 %
OXCARBAZEPINE SERPL-MCNC: 16 UG/ML
PLATELET # BLD AUTO: 226 K/UL
POTASSIUM SERPL-SCNC: 4.2 MMOL/L
PROT SERPL-MCNC: 6.9 G/DL
RBC # BLD: 4.63 M/UL
RBC # FLD: 13.2 %
RHEUMATOID FACT SER QL: <10 IU/ML
SODIUM SERPL-SCNC: 140 MMOL/L
THYROGLOB AB SERPL-ACNC: <20 IU/ML
THYROPEROXIDASE AB SERPL IA-ACNC: 33.6 IU/ML
TSH SERPL-ACNC: 1.64 UIU/ML
VIT B12 SERPL-MCNC: 378 PG/ML
WBC # FLD AUTO: 6.09 K/UL
ZONISAMIDE SERPL-MCNC: 16.6 UG/ML

## 2021-02-24 NOTE — DISCUSSION/SUMMARY
[FreeTextEntry1] : Impression:\par 1) refractory epilepsy, on multiple medications currently.  Zonisamide was the most recent, now on 300mg/d.   Keppra 500 mg BID (room to increase, has not been higher - no intolerance so far) and  Aptiom 1400 mg at bedtime continues, but in addition to clonazepam 0.5mg bid.\par 2) missed clonazepam doses - led to odd focal seizure, and EEG showed frequent rt frontal spiking at [facility].\par \par Plan:\par 1) attempt to slowly wean down off clonazepam.\par 2) but can use midazolam nasal sprayer.\par 3) MRI brain, epilepsy protocol

## 2021-02-24 NOTE — PHYSICAL EXAM
[FreeTextEntry1] : \par General:\par Constitutional:  Sitting comfortably in NAD.\par Psychiatric: well-groomed, appropriate affect, insight/judgment intact\par \par Cognitive:\par Orientation, language, memory and knowledge screens intact.\par No expressive or receptive errors.\par \par Cranial Nerves:\par VII: Face appears symmetric \par \par Motor:\par Power: no pronator drift.\par

## 2021-02-24 NOTE — HISTORY OF PRESENT ILLNESS
[FreeTextEntry1] : \belgica Lenz is a 31 yo, right handed, male returning for follow-up, last seen by NP Postel Aug 4, 2020.\par \par The medications seemed to be working - no longer passing out or having generalized convulsions.\par \par He reports 'fighting the seizure' in December, feeling the aura.  Was going to try to roll a joint as an emergency measure.  The aura kept on coming 'rolling in', and did not want to go the hospital but eventually relented as he could not figure out how to finish it off.   No headache\par \par Admitted in [place] for 2-3 days.   Had missed a few clonazepam doses, which may have triggered the event.  ZNS 19, LEV 5.7, OXC 13.\par \par Prior EEGs have shown Right hemispheric spikes.  Unclear if he has had an MRI brain (none on file).\par \par First seizure mid December 2014, the day before that seizure he had been out drinking. His first seizure was described as a grandmal. He states his last large seizure has been October. \par \blegica Describes sometimes not having a seizure but having a lapse in time where he is tired but nothing happens. Or being on the phone at work and out of no where coming back too and realizing that he has just missed a period of time. \par \belgica Possibly has about 5-6 seizures a month, but they cluster together. Then he can go for 2-3 months without having them. \par \belgica Has sensitivity to light. \par \par Aura description:\par Deep anxiety that starts from the head and travels to the pit of the stomach and his hands become sweaty and the next he knows he blacks out.\par Feels that he can provoke an aura. \par \par Denies headaches. \par \par Referred by Dr. Michael Moura at [facility] neurological [facility]. \par

## 2021-03-10 ENCOUNTER — APPOINTMENT (OUTPATIENT)
Dept: MRI IMAGING | Facility: HOSPITAL | Age: 31
End: 2021-03-10
Payer: MEDICAID

## 2021-03-10 ENCOUNTER — OUTPATIENT (OUTPATIENT)
Dept: OUTPATIENT SERVICES | Facility: HOSPITAL | Age: 31
LOS: 1 days | End: 2021-03-10
Payer: COMMERCIAL

## 2021-03-10 ENCOUNTER — RESULT REVIEW (OUTPATIENT)
Age: 31
End: 2021-03-10

## 2021-03-10 DIAGNOSIS — N44.00 TORSION OF TESTIS, UNSPECIFIED: Chronic | ICD-10-CM

## 2021-03-10 PROCEDURE — 70551 MRI BRAIN STEM W/O DYE: CPT | Mod: 26

## 2021-03-10 PROCEDURE — 76377 3D RENDER W/INTRP POSTPROCES: CPT | Mod: 26

## 2021-03-10 PROCEDURE — 76377 3D RENDER W/INTRP POSTPROCES: CPT

## 2021-03-10 PROCEDURE — 70551 MRI BRAIN STEM W/O DYE: CPT

## 2021-05-24 NOTE — PHYSICAL EXAM
[FreeTextEntry1] : General:\par Constitutional: Sitting comfortably in NAD.\par Psychiatric: well-groomed, appropriate affect, insight/judgment intact\par \par Cognitive: retention 3/3 at 5 min \par Orientation, language, memory and knowledge screens intact.\par Cranial Nerves:\par II: Full to confrontation III/IV/VI: PERRL EOMF No nystagmus\par V1V2V3: Symmetric, VII: Face appears symmetric VIII: Normal to screening, XI: Trapezius Symmetric XII: Tongue midline\par Motor:\par Power: 5/5 throughout, tone: normal x 4 limbs, no tremor \par Sensation:\par Intact to light touch. \par Coordination/Gait:\par Finger-nose-finger intact, normal rapid-alternating movements.\par Fine motor normal with normal rapid finger taps \par Narrow based gait, tandem forward and back ok\par heel and toe walking normal \par Reflexes:\par DTR: 2+ symmetric x 4 limbs  room air

## 2021-06-14 ENCOUNTER — NON-APPOINTMENT (OUTPATIENT)
Age: 31
End: 2021-06-14

## 2021-06-14 ENCOUNTER — APPOINTMENT (OUTPATIENT)
Dept: NEUROLOGY | Facility: CLINIC | Age: 31
End: 2021-06-14

## 2021-06-16 ENCOUNTER — APPOINTMENT (OUTPATIENT)
Dept: NEUROLOGY | Facility: CLINIC | Age: 31
End: 2021-06-16

## 2021-10-29 ENCOUNTER — RX RENEWAL (OUTPATIENT)
Age: 31
End: 2021-10-29

## 2021-12-03 ENCOUNTER — APPOINTMENT (OUTPATIENT)
Dept: NEUROLOGY | Facility: CLINIC | Age: 31
End: 2021-12-03
Payer: MEDICAID

## 2021-12-03 VITALS
HEIGHT: 68 IN | OXYGEN SATURATION: 100 % | DIASTOLIC BLOOD PRESSURE: 67 MMHG | SYSTOLIC BLOOD PRESSURE: 106 MMHG | WEIGHT: 224 LBS | TEMPERATURE: 97.5 F | BODY MASS INDEX: 33.95 KG/M2 | HEART RATE: 77 BPM

## 2021-12-03 PROCEDURE — 99214 OFFICE O/P EST MOD 30 MIN: CPT

## 2021-12-03 RX ORDER — ZONISAMIDE 50 MG/1
50 CAPSULE ORAL
Qty: 30 | Refills: 0 | Status: COMPLETED | COMMUNITY
Start: 2020-08-04 | End: 2021-12-03

## 2021-12-03 RX ORDER — ZONISAMIDE 100 MG/1
100 CAPSULE ORAL
Qty: 60 | Refills: 3 | Status: COMPLETED | COMMUNITY
Start: 2020-08-04 | End: 2021-12-03

## 2021-12-07 LAB
ALBUMIN SERPL ELPH-MCNC: 4.4 G/DL
ALP BLD-CCNC: 117 U/L
ALT SERPL-CCNC: 21 U/L
ANION GAP SERPL CALC-SCNC: 15 MMOL/L
AST SERPL-CCNC: 10 U/L
BASOPHILS # BLD AUTO: 0.05 K/UL
BASOPHILS NFR BLD AUTO: 0.8 %
BILIRUB SERPL-MCNC: 0.2 MG/DL
BUN SERPL-MCNC: 16 MG/DL
CALCIUM SERPL-MCNC: 9.2 MG/DL
CHLORIDE SERPL-SCNC: 107 MMOL/L
CO2 SERPL-SCNC: 18 MMOL/L
CREAT SERPL-MCNC: 1.1 MG/DL
EOSINOPHIL # BLD AUTO: 0.21 K/UL
EOSINOPHIL NFR BLD AUTO: 3.4 %
ESTIMATED AVERAGE GLUCOSE: 108 MG/DL
GLUCOSE SERPL-MCNC: 79 MG/DL
HBA1C MFR BLD HPLC: 5.4 %
HCT VFR BLD CALC: 43.5 %
HGB BLD-MCNC: 14.2 G/DL
IMM GRANULOCYTES NFR BLD AUTO: 0.3 %
LYMPHOCYTES # BLD AUTO: 1.19 K/UL
LYMPHOCYTES NFR BLD AUTO: 19.4 %
MAN DIFF?: NORMAL
MCHC RBC-ENTMCNC: 29 PG
MCHC RBC-ENTMCNC: 32.6 GM/DL
MCV RBC AUTO: 89 FL
MONOCYTES # BLD AUTO: 0.86 K/UL
MONOCYTES NFR BLD AUTO: 14 %
NEUTROPHILS # BLD AUTO: 3.8 K/UL
NEUTROPHILS NFR BLD AUTO: 62.1 %
PLATELET # BLD AUTO: 247 K/UL
POTASSIUM SERPL-SCNC: 4.4 MMOL/L
PROT SERPL-MCNC: 7.2 G/DL
RBC # BLD: 4.89 M/UL
RBC # FLD: 13.4 %
SODIUM SERPL-SCNC: 139 MMOL/L
WBC # FLD AUTO: 6.13 K/UL
ZONISAMIDE SERPL-MCNC: 20.1 UG/ML

## 2021-12-08 LAB
LEVETIRACETAM SERPL-MCNC: 8.4 UG/ML
OXCARBAZEPINE SERPL-MCNC: 19 UG/ML

## 2021-12-10 ENCOUNTER — RX RENEWAL (OUTPATIENT)
Age: 31
End: 2021-12-10

## 2021-12-15 RX ORDER — CLONAZEPAM 0.5 MG/1
0.5 TABLET ORAL
Qty: 90 | Refills: 0 | Status: COMPLETED | COMMUNITY
Start: 2021-10-29 | End: 2021-12-15

## 2021-12-18 NOTE — DISCUSSION/SUMMARY
[FreeTextEntry1] : Impression:\par 1) improved but with time-loss and frozen spells, refractory epilepsy, on multiple medications currently.  Zonisamide was the most recent, now on 300mg/d. Keppra 500 mg BID (room to increase, has not been higher - no intolerance so far) and  Aptiom 1400 mg at bedtime continues, but in addition to clonazepam 0.5mg bid.\par 2) missed clonazepam doses - led to odd focal seizure, and EEG showed frequent rt frontal spiking at [facility].\par \par Plan:\par 1) attempt to slowly wean down off clonazepam.\par 2) can use midazolam nasal sprayer.\par 3) repeat rEEG/abmEEG\par 4) levels today

## 2021-12-18 NOTE — HISTORY OF PRESENT ILLNESS
[FreeTextEntry1] : \belgica Lenz is a 32 yo, right handed, male returning for follow-up.  Referred by Dr. Michael Moura at [facility] neurological care. \par \par He restarted working after the pandemic, currently at [employer].\par Reports that he may have had a mild seizure while working there 2-3 months ago - feels that he had time loss and was probably just standing there - no one seemed to have noticed.  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  It eventually improved without incident.  This event has occurred before, falling off a couch and feeling unable to move, locked in place.\par \par There have been a few other episodes of time loss and continued actions without knowing what he is doing.  Improve though, no longer passing out or having generalized convulsions.\par His 's ran out and used 1000mg bid, but then had that event above.\par ZNS is at 300mg, aptiom is at 1400mg/d\par LEV 500mg bid\par \par He stopped smoking cigarettes and MJ, cut down alcohol and feels limiting these triggers have helped.\par \par He is wondering if he needs to see a dentist, as he may be bleeding when brushing his teeth.  Feels this may be due to his grinding his teeth during seizures.\par \par \belgica Admitted in [place] for 2-3 days.   Had missed a few clonazepam doses, which may have triggered the event.  \par \par First seizure mid December 2014, the day before that seizure he had been out drinking. His first seizure was described as a 'grandmal'. He states his last large seizure has been October. \par \par Describes sometimes not having a seizure but having a lapse in time where he is tired but nothing happens. Or being on the phone at work and out of no where coming back too and realizing that he has just missed a period of time. \par \belgica Possibly has about 5-6 seizures a month, but they cluster together. Then he can go for 2-3 months without having them. \par \belgica Has sensitivity to light. \par \par Aura description:\par Deep anxiety that starts from the head and travels to the pit of the stomach and his hands become sweaty and the next he knows he blacks out.\par Feels that he can provoke an aura.

## 2021-12-26 NOTE — ED ADULT TRIAGE NOTE - HEART RATE (BEATS/MIN)
Pt's IV line removed without complications. Discussed d/c instructions with patient, given opportunity to ask questions, and provided new medication education with side effects. Follow up appointment information included in d/c instructions. Pt verbalized understanding of d/c instructions. Patient was discharged to home with all belongings and taken outside via wheelchair. 72

## 2022-01-02 ENCOUNTER — INPATIENT (INPATIENT)
Facility: HOSPITAL | Age: 32
LOS: 0 days | Discharge: HOME | End: 2022-01-03
Attending: STUDENT IN AN ORGANIZED HEALTH CARE EDUCATION/TRAINING PROGRAM | Admitting: STUDENT IN AN ORGANIZED HEALTH CARE EDUCATION/TRAINING PROGRAM
Payer: MEDICAID

## 2022-01-02 VITALS
OXYGEN SATURATION: 98 % | DIASTOLIC BLOOD PRESSURE: 64 MMHG | HEIGHT: 68 IN | RESPIRATION RATE: 16 BRPM | HEART RATE: 115 BPM | WEIGHT: 220.02 LBS | TEMPERATURE: 103 F | SYSTOLIC BLOOD PRESSURE: 121 MMHG

## 2022-01-02 DIAGNOSIS — N44.00 TORSION OF TESTIS, UNSPECIFIED: Chronic | ICD-10-CM

## 2022-01-02 LAB
ALBUMIN SERPL ELPH-MCNC: 4.1 G/DL — SIGNIFICANT CHANGE UP (ref 3.5–5.2)
ALP SERPL-CCNC: 113 U/L — SIGNIFICANT CHANGE UP (ref 30–115)
ALT FLD-CCNC: 24 U/L — SIGNIFICANT CHANGE UP (ref 0–41)
ANION GAP SERPL CALC-SCNC: 15 MMOL/L — HIGH (ref 7–14)
AST SERPL-CCNC: 10 U/L — SIGNIFICANT CHANGE UP (ref 0–41)
BASE EXCESS BLDV CALC-SCNC: -0.5 MMOL/L — SIGNIFICANT CHANGE UP (ref -2–3)
BASOPHILS # BLD AUTO: 0.04 K/UL — SIGNIFICANT CHANGE UP (ref 0–0.2)
BASOPHILS NFR BLD AUTO: 0.4 % — SIGNIFICANT CHANGE UP (ref 0–1)
BILIRUB SERPL-MCNC: 0.3 MG/DL — SIGNIFICANT CHANGE UP (ref 0.2–1.2)
BUN SERPL-MCNC: 14 MG/DL — SIGNIFICANT CHANGE UP (ref 10–20)
CA-I SERPL-SCNC: 1.14 MMOL/L — LOW (ref 1.15–1.33)
CALCIUM SERPL-MCNC: 8.6 MG/DL — SIGNIFICANT CHANGE UP (ref 8.5–10.1)
CHLORIDE SERPL-SCNC: 102 MMOL/L — SIGNIFICANT CHANGE UP (ref 98–110)
CO2 SERPL-SCNC: 18 MMOL/L — SIGNIFICANT CHANGE UP (ref 17–32)
CREAT SERPL-MCNC: 1.1 MG/DL — SIGNIFICANT CHANGE UP (ref 0.7–1.5)
EOSINOPHIL # BLD AUTO: 0.01 K/UL — SIGNIFICANT CHANGE UP (ref 0–0.7)
EOSINOPHIL NFR BLD AUTO: 0.1 % — SIGNIFICANT CHANGE UP (ref 0–8)
GAS PNL BLDV: 129 MMOL/L — LOW (ref 136–145)
GAS PNL BLDV: SIGNIFICANT CHANGE UP
GLUCOSE SERPL-MCNC: 93 MG/DL — SIGNIFICANT CHANGE UP (ref 70–99)
HCO3 BLDV-SCNC: 24 MMOL/L — SIGNIFICANT CHANGE UP (ref 22–29)
HCT VFR BLD CALC: 41.6 % — LOW (ref 42–52)
HCT VFR BLDA CALC: 50 % — SIGNIFICANT CHANGE UP (ref 39–51)
HGB BLD CALC-MCNC: 16.7 G/DL — SIGNIFICANT CHANGE UP (ref 12.6–17.4)
HGB BLD-MCNC: 13.7 G/DL — LOW (ref 14–18)
IMM GRANULOCYTES NFR BLD AUTO: 0.5 % — HIGH (ref 0.1–0.3)
LACTATE BLDV-MCNC: 1.7 MMOL/L — SIGNIFICANT CHANGE UP (ref 0.5–2)
LACTATE SERPL-SCNC: 1.4 MMOL/L — SIGNIFICANT CHANGE UP (ref 0.7–2)
LIDOCAIN IGE QN: 26 U/L — SIGNIFICANT CHANGE UP (ref 7–60)
LYMPHOCYTES # BLD AUTO: 0.56 K/UL — LOW (ref 1.2–3.4)
LYMPHOCYTES # BLD AUTO: 5.6 % — LOW (ref 20.5–51.1)
MAGNESIUM SERPL-MCNC: 1.7 MG/DL — LOW (ref 1.8–2.4)
MCHC RBC-ENTMCNC: 28.6 PG — SIGNIFICANT CHANGE UP (ref 27–31)
MCHC RBC-ENTMCNC: 32.9 G/DL — SIGNIFICANT CHANGE UP (ref 32–37)
MCV RBC AUTO: 86.8 FL — SIGNIFICANT CHANGE UP (ref 80–94)
MONOCYTES # BLD AUTO: 1.52 K/UL — HIGH (ref 0.1–0.6)
MONOCYTES NFR BLD AUTO: 15.3 % — HIGH (ref 1.7–9.3)
NEUTROPHILS # BLD AUTO: 7.74 K/UL — HIGH (ref 1.4–6.5)
NEUTROPHILS NFR BLD AUTO: 78.1 % — HIGH (ref 42.2–75.2)
NRBC # BLD: 0 /100 WBCS — SIGNIFICANT CHANGE UP (ref 0–0)
PCO2 BLDV: 39 MMHG — LOW (ref 42–55)
PH BLDV: 7.4 — SIGNIFICANT CHANGE UP (ref 7.32–7.43)
PLATELET # BLD AUTO: 197 K/UL — SIGNIFICANT CHANGE UP (ref 130–400)
PO2 BLDV: 52 MMHG — SIGNIFICANT CHANGE UP
POTASSIUM BLDV-SCNC: 4.1 MMOL/L — SIGNIFICANT CHANGE UP (ref 3.5–5.1)
POTASSIUM SERPL-MCNC: 4.1 MMOL/L — SIGNIFICANT CHANGE UP (ref 3.5–5)
POTASSIUM SERPL-SCNC: 4.1 MMOL/L — SIGNIFICANT CHANGE UP (ref 3.5–5)
PROT SERPL-MCNC: 7.1 G/DL — SIGNIFICANT CHANGE UP (ref 6–8)
RAPID RVP RESULT: DETECTED
RBC # BLD: 4.79 M/UL — SIGNIFICANT CHANGE UP (ref 4.7–6.1)
RBC # FLD: 13.6 % — SIGNIFICANT CHANGE UP (ref 11.5–14.5)
SAO2 % BLDV: 85 % — SIGNIFICANT CHANGE UP
SARS-COV-2 RNA SPEC QL NAA+PROBE: DETECTED
SARS-COV-2 RNA SPEC QL NAA+PROBE: DETECTED
SODIUM SERPL-SCNC: 135 MMOL/L — SIGNIFICANT CHANGE UP (ref 135–146)
WBC # BLD: 9.92 K/UL — SIGNIFICANT CHANGE UP (ref 4.8–10.8)
WBC # FLD AUTO: 9.92 K/UL — SIGNIFICANT CHANGE UP (ref 4.8–10.8)

## 2022-01-02 PROCEDURE — 99497 ADVNCD CARE PLAN 30 MIN: CPT | Mod: 25

## 2022-01-02 PROCEDURE — 99223 1ST HOSP IP/OBS HIGH 75: CPT

## 2022-01-02 PROCEDURE — 99285 EMERGENCY DEPT VISIT HI MDM: CPT

## 2022-01-02 PROCEDURE — 93010 ELECTROCARDIOGRAM REPORT: CPT

## 2022-01-02 PROCEDURE — 71045 X-RAY EXAM CHEST 1 VIEW: CPT | Mod: 26

## 2022-01-02 RX ORDER — ESLICARBAZEPINE ACETATE 800 MG/1
1400 TABLET ORAL
Refills: 0 | Status: DISCONTINUED | OUTPATIENT
Start: 2022-01-02 | End: 2022-01-03

## 2022-01-02 RX ORDER — SODIUM CHLORIDE 9 MG/ML
1000 INJECTION INTRAMUSCULAR; INTRAVENOUS; SUBCUTANEOUS ONCE
Refills: 0 | Status: COMPLETED | OUTPATIENT
Start: 2022-01-02 | End: 2022-01-02

## 2022-01-02 RX ORDER — ENOXAPARIN SODIUM 100 MG/ML
40 INJECTION SUBCUTANEOUS
Refills: 0 | Status: DISCONTINUED | OUTPATIENT
Start: 2022-01-02 | End: 2022-01-03

## 2022-01-02 RX ORDER — MAGNESIUM OXIDE 400 MG ORAL TABLET 241.3 MG
400 TABLET ORAL ONCE
Refills: 0 | Status: COMPLETED | OUTPATIENT
Start: 2022-01-02 | End: 2022-01-02

## 2022-01-02 RX ORDER — ZONISAMIDE 100 MG
300 CAPSULE ORAL AT BEDTIME
Refills: 0 | Status: DISCONTINUED | OUTPATIENT
Start: 2022-01-02 | End: 2022-01-03

## 2022-01-02 RX ORDER — CLONAZEPAM 1 MG
1 TABLET ORAL
Qty: 0 | Refills: 0 | DISCHARGE

## 2022-01-02 RX ORDER — CLONAZEPAM 1 MG
0.5 TABLET ORAL AT BEDTIME
Refills: 0 | Status: DISCONTINUED | OUTPATIENT
Start: 2022-01-02 | End: 2022-01-03

## 2022-01-02 RX ORDER — LEVETIRACETAM 250 MG/1
500 TABLET, FILM COATED ORAL ONCE
Refills: 0 | Status: COMPLETED | OUTPATIENT
Start: 2022-01-02 | End: 2022-01-02

## 2022-01-02 RX ORDER — ACETAMINOPHEN 500 MG
975 TABLET ORAL ONCE
Refills: 0 | Status: COMPLETED | OUTPATIENT
Start: 2022-01-02 | End: 2022-01-02

## 2022-01-02 RX ORDER — LEVETIRACETAM 250 MG/1
500 TABLET, FILM COATED ORAL
Refills: 0 | Status: DISCONTINUED | OUTPATIENT
Start: 2022-01-02 | End: 2022-01-03

## 2022-01-02 RX ORDER — ACETAMINOPHEN 500 MG
650 TABLET ORAL EVERY 6 HOURS
Refills: 0 | Status: DISCONTINUED | OUTPATIENT
Start: 2022-01-02 | End: 2022-01-03

## 2022-01-02 RX ADMIN — LEVETIRACETAM 400 MILLIGRAM(S): 250 TABLET, FILM COATED ORAL at 18:38

## 2022-01-02 RX ADMIN — SODIUM CHLORIDE 1000 MILLILITER(S): 9 INJECTION INTRAMUSCULAR; INTRAVENOUS; SUBCUTANEOUS at 15:24

## 2022-01-02 RX ADMIN — Medication 975 MILLIGRAM(S): at 15:56

## 2022-01-02 RX ADMIN — MAGNESIUM OXIDE 400 MG ORAL TABLET 400 MILLIGRAM(S): 241.3 TABLET ORAL at 16:54

## 2022-01-02 NOTE — ED PROVIDER NOTE - PROGRESS NOTE DETAILS
discussed case w/ [facility].  recommends madit and eeg 2/2 frequency.  discussed case with dr. lucas agrees.  can be routine eeg.  hospitalist aware of admission.

## 2022-01-02 NOTE — H&P ADULT - HISTORY OF PRESENT ILLNESS
31 yr old male with hx of absence seizure presetned to ER for breakthrough seizures and fever. Patient states for past two day had multiple episodes of seizure at home. Patient has been compliant with his medication Pt follows neurology Anatoly Hilario at [facility]. Pt denies recent head injury, headache vision changes f/c/n/v/d, chest pain, shortness of breath, dysuria/hematuria, back pain, numbness/focal weakness. Thinks he bit the inside of his cheeks but denies urinary/fecal incontinence    At the time of admission, pt in no acute distress and is speaking in full sentences, requesting lunch, aware of the inpatient monitoring.  Will admit for monitoring with Neuro consult for VEEG if needed  31 yr old male with hx of absence seizure presetned to ER for breakthrough seizures and fever. Patient states for past two day had multiple episodes of seizure at home. Patient has been compliant with his medication Pt follows neurology Anatoly Hilario at [facility]. Pt denies recent head injury, headache vision changes f/c/n/v/d, chest pain, shortness of breath, dysuria/hematuria, back pain, numbness/focal weakness. Thinks he bit the inside of his cheeks but denies urinary/fecal incontinence    At the time of admission, pt in no acute distress and is speaking in full sentences, requesting lunch, aware of the inpatient monitoring.  Will admit for monitoring with Neuro consult for VEEG if needed   COVID (+)

## 2022-01-02 NOTE — H&P ADULT - NSHPLABSRESULTS_GEN_ALL_CORE
13.7   9.92  )-----------( 197      ( 02 Jan 2022 15:00 )             41.6       01-02    135  |  102  |  14  ----------------------------<  93  4.1   |  18  |  1.1    Ca    8.6      02 Jan 2022 15:00  Mg     1.7     01-02    TPro  7.1  /  Alb  4.1  /  TBili  0.3  /  DBili  x   /  AST  10  /  ALT  24  /  AlkPhos  113  01-02      Xray Chest (01.02.22 ): No radiographic evidence of acute cardiopulmonary disease.

## 2022-01-02 NOTE — ED ADULT TRIAGE NOTE - NSWEIGHTCALCTOOLDRUG_GEN_A_CORE
This consult note has been dictated, Reference Number:    [id number]    EMMA OCONNELL PA-C, [facility]  Orthopedic Surgery [facility]  [phone]      used

## 2022-01-02 NOTE — ED PROVIDER NOTE - OBJECTIVE STATEMENT
31 y.o male w/ hx of seizures presents to the ED for evaluation of seizures.  believes he had 4-5 absence seizures over the weekend.  one witnessed by mother and urged pt to come to the ED for further evaluation.  has been complaint on home meds.  noted to have fever today, no further complaints.  no tonic/clonic seizures.  follows w/ neurology at [facility] .   does not drive. states typical seizures are absence.  last seizure 4 months ago.

## 2022-01-02 NOTE — ED PROVIDER NOTE - NS ED ROS FT
Constitutional: + fever. See HPI.  Eyes: No visual changes, eye pain or discharge. No Photophobia  ENMT: No hearing changes, pain, discharge or infections. No neck pain or stiffness. No limited ROM  Cardiac: No SOB or edema. No chest pain with exertion.  Respiratory: No cough or respiratory distress. No hemoptysis.   GI: No nausea, vomiting, diarrhea or abdominal pain.  : No dysuria, frequency or burning. No Discharge  MS: No myalgia, muscle weakness, joint pain or back pain.  Neuro: + seizure. No headache or weakness.   Skin: No skin rash.  Except as documented in the HPI, all other systems are negative.

## 2022-01-02 NOTE — H&P ADULT - ASSESSMENT
31 yr old male with hx of absence seizure presented to ER for breakthrough seizures and COVID.    # Breakthrough Seizure   - AAOX3 at encounter   - CT Head No Cont (11.18.21): No evidence of intracranial hemorrhage, territorial infarct, or mass effect.  - c/w home AED  - check AED levels  - seizure precaution   - Ativan 2mg IV PRN for seizure  - keep Mg <2  - VEEG  - Epilepsy consult     # COVID   - day 1 of symptoms   - Never vaccinated   - sat 98%  - Xray Chest (01.02.22 ): No radiographic evidence of acute cardiopulmonary disease.  - hold decadron and RDV  - start Tylenol prn  - check CRP, ferritin, procal   - isolation precaution   - check ambulatory pulseOx     # Obesity  - BMI >30    -weight loss and diet modification   # DVT ppx  - start Lovenox q12    # Regular diet     # Full Code 42 yo M with HIV DLBCL admitted for C4 dose-adjusted R-EPOCH day 1 (s/p Rituxan as inpt on 4/26), with IT Mtx 2x/wk  -cont chemo, plan on discharge after end of chemo  -LP with chemo on 4/26 -f/u flow cytometry. Cell count-total nucleated cells 16. Next LP scheduled for 4/30  -monitor counts, transfuse PRN  -monitor for fevers 31 yr old male with hx of absence seizure presented to ER for breakthrough seizures and COVID.    # Breakthrough Seizure   - AAOX3 at encounter   - CT Head No Cont (11.18.21): No evidence of intracranial hemorrhage, territorial infarct, or mass effect.  - c/w home AED  - check AED levels  - seizure precaution   - Ativan 2mg IV PRN for seizure  - keep Mg <2  - VEEG  - Epilepsy consult     # COVID   - day 2 of symptoms of sore throat, fever in ER  - 2 dose of pfizer vaccine received in october   - sat 99%  - Xray Chest (01.02.22 ): No radiographic evidence of acute cardiopulmonary disease.  - hold decadron and RDV  - start Tylenol prn  - check CRP, ferritin, procal   - isolation precaution   - check ambulatory pulseOx     # Obesity  - BMI >30  -weight loss and diet modification     # DVT ppx  - start Lovenox q12    # Ambulate as tolerated    # Regular diet     # Full Code 31 yr old male with hx of absence seizure presented to ER for breakthrough seizures and COVID.    # Breakthrough Seizure   - AAOX3 at encounter   - CT Head No Cont (11.18.21): No evidence of intracranial hemorrhage, territorial infarct, or mass effect.  - c/w home AED  - check AED levels  - seizure precaution   - Ativan 2mg IV PRN for seizure  - keep Mg <2  - VEEG  - Epilepsy consult     # Hypomagnesemia   - Mg replete    # COVID   - day 2 of symptoms of sore throat, fever in ER  - 2 dose of pfizer vaccine received in october   - sat 99% on RA  - Xray Chest (01.02.22 ): No radiographic evidence of acute cardiopulmonary disease.  - hold decadron and RDV  - start Tylenol prn  - check CRP, ferritin, procal   - isolation precaution   - check ambulatory pulseOx     # Obesity  - BMI >30  -weight loss and diet modification     # DVT ppx  - start Lovenox q12    # Ambulate as tolerated    # Regular diet     # Full Code

## 2022-01-02 NOTE — ED PROVIDER NOTE - WR ORDER DATE AND TIME 1
Patient called per NP:  Please call patient-this office has not refilled her insulin/lantus-last refilled by endocrinology. Patient states she does not have an endocrinologist and Dr. Maria L Mitchell was refilling them for her. The last refill she had was due to hospital admissions. 02-Jan-2022 14:33

## 2022-01-02 NOTE — ED ADULT TRIAGE NOTE - CHIEF COMPLAINT QUOTE
Patient stated he had a witnessed seizure an hour ago and fell of the couch, complaining of a headache. LOC knows it last approx 10 minutes no shaking or jerking motions.

## 2022-01-02 NOTE — ED PROVIDER NOTE - CLINICAL SUMMARY MEDICAL DECISION MAKING FREE TEXT BOX
31y male h/o recurrent partial seizures on stable doses AEDs bib mother for frequent breakthrough seizures, found to have covid, no dyspnea or hypoxia, is AAO x 3 neck supple neuro inact, d/w neuro, will admit for eeg

## 2022-01-03 ENCOUNTER — TRANSCRIPTION ENCOUNTER (OUTPATIENT)
Age: 32
End: 2022-01-03

## 2022-01-03 VITALS
RESPIRATION RATE: 18 BRPM | DIASTOLIC BLOOD PRESSURE: 75 MMHG | TEMPERATURE: 97 F | SYSTOLIC BLOOD PRESSURE: 130 MMHG | HEART RATE: 96 BPM

## 2022-01-03 LAB
ALBUMIN SERPL ELPH-MCNC: 4 G/DL — SIGNIFICANT CHANGE UP (ref 3.5–5.2)
ALP SERPL-CCNC: 111 U/L — SIGNIFICANT CHANGE UP (ref 30–115)
ALT FLD-CCNC: 22 U/L — SIGNIFICANT CHANGE UP (ref 0–41)
ANION GAP SERPL CALC-SCNC: 11 MMOL/L — SIGNIFICANT CHANGE UP (ref 7–14)
AST SERPL-CCNC: 9 U/L — SIGNIFICANT CHANGE UP (ref 0–41)
BASOPHILS # BLD AUTO: 0.04 K/UL — SIGNIFICANT CHANGE UP (ref 0–0.2)
BASOPHILS NFR BLD AUTO: 0.6 % — SIGNIFICANT CHANGE UP (ref 0–1)
BILIRUB SERPL-MCNC: 0.2 MG/DL — SIGNIFICANT CHANGE UP (ref 0.2–1.2)
BUN SERPL-MCNC: 13 MG/DL — SIGNIFICANT CHANGE UP (ref 10–20)
CALCIUM SERPL-MCNC: 8.2 MG/DL — LOW (ref 8.5–10.1)
CHLORIDE SERPL-SCNC: 103 MMOL/L — SIGNIFICANT CHANGE UP (ref 98–110)
CO2 SERPL-SCNC: 20 MMOL/L — SIGNIFICANT CHANGE UP (ref 17–32)
CREAT SERPL-MCNC: 1 MG/DL — SIGNIFICANT CHANGE UP (ref 0.7–1.5)
EOSINOPHIL # BLD AUTO: 0.02 K/UL — SIGNIFICANT CHANGE UP (ref 0–0.7)
EOSINOPHIL NFR BLD AUTO: 0.3 % — SIGNIFICANT CHANGE UP (ref 0–8)
GLUCOSE SERPL-MCNC: 101 MG/DL — HIGH (ref 70–99)
HCT VFR BLD CALC: 42 % — SIGNIFICANT CHANGE UP (ref 42–52)
HGB BLD-MCNC: 13.6 G/DL — LOW (ref 14–18)
IMM GRANULOCYTES NFR BLD AUTO: 0.3 % — SIGNIFICANT CHANGE UP (ref 0.1–0.3)
LYMPHOCYTES # BLD AUTO: 1.31 K/UL — SIGNIFICANT CHANGE UP (ref 1.2–3.4)
LYMPHOCYTES # BLD AUTO: 20.2 % — LOW (ref 20.5–51.1)
MAGNESIUM SERPL-MCNC: 2 MG/DL — SIGNIFICANT CHANGE UP (ref 1.8–2.4)
MCHC RBC-ENTMCNC: 28.6 PG — SIGNIFICANT CHANGE UP (ref 27–31)
MCHC RBC-ENTMCNC: 32.4 G/DL — SIGNIFICANT CHANGE UP (ref 32–37)
MCV RBC AUTO: 88.4 FL — SIGNIFICANT CHANGE UP (ref 80–94)
MONOCYTES # BLD AUTO: 1.6 K/UL — HIGH (ref 0.1–0.6)
MONOCYTES NFR BLD AUTO: 24.6 % — HIGH (ref 1.7–9.3)
NEUTROPHILS # BLD AUTO: 3.51 K/UL — SIGNIFICANT CHANGE UP (ref 1.4–6.5)
NEUTROPHILS NFR BLD AUTO: 54 % — SIGNIFICANT CHANGE UP (ref 42.2–75.2)
NRBC # BLD: 0 /100 WBCS — SIGNIFICANT CHANGE UP (ref 0–0)
PLATELET # BLD AUTO: 188 K/UL — SIGNIFICANT CHANGE UP (ref 130–400)
POTASSIUM SERPL-MCNC: 3.8 MMOL/L — SIGNIFICANT CHANGE UP (ref 3.5–5)
POTASSIUM SERPL-SCNC: 3.8 MMOL/L — SIGNIFICANT CHANGE UP (ref 3.5–5)
PROT SERPL-MCNC: 6.3 G/DL — SIGNIFICANT CHANGE UP (ref 6–8)
RBC # BLD: 4.75 M/UL — SIGNIFICANT CHANGE UP (ref 4.7–6.1)
RBC # FLD: 13.5 % — SIGNIFICANT CHANGE UP (ref 11.5–14.5)
SODIUM SERPL-SCNC: 134 MMOL/L — LOW (ref 135–146)
WBC # BLD: 6.5 K/UL — SIGNIFICANT CHANGE UP (ref 4.8–10.8)
WBC # FLD AUTO: 6.5 K/UL — SIGNIFICANT CHANGE UP (ref 4.8–10.8)

## 2022-01-03 PROCEDURE — 99238 HOSP IP/OBS DSCHRG MGMT 30/<: CPT

## 2022-01-03 PROCEDURE — 99221 1ST HOSP IP/OBS SF/LOW 40: CPT

## 2022-01-03 RX ORDER — ACETAMINOPHEN 500 MG
1 TABLET ORAL
Qty: 12 | Refills: 0
Start: 2022-01-03 | End: 2022-01-05

## 2022-01-03 RX ADMIN — Medication 300 MILLIGRAM(S): at 00:03

## 2022-01-03 RX ADMIN — LEVETIRACETAM 500 MILLIGRAM(S): 250 TABLET, FILM COATED ORAL at 06:07

## 2022-01-03 RX ADMIN — Medication 0.5 MILLIGRAM(S): at 00:02

## 2022-01-03 RX ADMIN — ESLICARBAZEPINE ACETATE 1400 MILLIGRAM(S): 800 TABLET ORAL at 00:02

## 2022-01-03 NOTE — DISCHARGE NOTE NURSING/CASE MANAGEMENT/SOCIAL WORK - PATIENT PORTAL LINK FT
You can access the FollowMyHealth Patient Portal offered by [facility] by registering at the following website: [email or web address]. By joining [facility]’s FollowMyHealth portal, you will also be able to view your health information using other applications (apps) compatible with our system.

## 2022-01-03 NOTE — DISCHARGE NOTE NURSING/CASE MANAGEMENT/SOCIAL WORK - NSDCPEFALRISK_GEN_ALL_CORE
For information on Fall & Injury Prevention, visit: [email or web address] OR  [email or web address] OR  https://www.cdc.gov/steadi/patient.html

## 2022-01-03 NOTE — CONSULT NOTE ADULT - ATTENDING COMMENTS
I have personally seen and examined this patient on 1/3 with ACP  I have fully participated in the care of this patient.  I have reviewed all pertinent clinical information, including history, physical exam, plan and note. Recent seizures is more likely due to fever and Covid. Reports mini seizures in the setting of non compliance. Continue current dose of AEDs and follow up as an outpatient with Dr Gordon.   I have reviewed all pertinent clinical information and reviewed all relevant imaging and diagnostic studies personally.  Recommendations as above.  Agree with above assessment except as noted.

## 2022-01-03 NOTE — DISCHARGE NOTE PROVIDER - CARE PROVIDER_API CALL
Loco Purcell)  Neurology  [address] [place] [address]  Phone: [phone]  Fax: [phone]  Follow Up Time: 1-3 days

## 2022-01-03 NOTE — CONSULT NOTE ADULT - SUBJECTIVE AND OBJECTIVE BOX
Neurology/Epilepsy Consult:    REYES CHARLINE 31y Male  MRN-[id number]    Patient is a 31y old  Male who presents with a chief complaint of seizures        HPI: History obtained from patient. EMR and outpatient records reviewed.  Patient presented to ED with c/o fever, chills, and breakthrough seizures for 2 days PTA. He had several episodes of lapses in memory and time (one of his typical seizure types). Patient reports being compliant with medications, may have spit out medications 2 days prior to arrival since felt significant throat i  No seizures reported since admission.  31 yr old male with hx of absence seizure presetned to ER for breakthrough seizures and fever. Patient states for past two day had multiple episodes of seizure at home. Patient has been compliant with his medication Pt follows neurology Anatoly Hilario at [facility]. Pt denies recent head injury, headache vision changes f/c/n/v/d, chest pain, shortness of breath, dysuria/hematuria, back pain, numbness/focal weakness. Thinks he bit the inside of his cheeks but denies urinary/fecal incontinence    At the time of admission, pt in no acute distress and is speaking in full sentences, requesting lunch, aware of the inpatient monitoring.  Will admit for monitoring with Neuro consult for VEEG if needed   COVID (+)  (02 Jan 2022 19:35)        PAST MEDICAL & SURGICAL HISTORY:  Seizure disorder  Left testicular torsion          Allergy:  Lamictal (Unknown)        Home Medications:  eslicarbazepine 600 mg oral tablet: 7 tab(s) orally once a day   eslicarbazepine 800 mg oral tablet: 7 tab(s) orally once a day   KlonoPIN 0.5 mg oral tablet: 1 tab(s) orally twice a day   levETIRAcetam 500 mg oral tablet: 1 tab(s) orally every 12 hours   zonisamide 100 mg oral capsule: 3 cap(s) orally once a day (at bedtime) (04 Dec 2020 12:29)        MEDICATIONS  (STANDING):  clonazePAM  Tablet 0.5 milliGRAM(s) Oral at bedtime  enoxaparin Injectable 40 milliGRAM(s) SubCutaneous two times a day  eslicarbazepine Oral Tab/Cap - Peds 1400 milliGRAM(s) Oral <User Schedule>  levETIRAcetam 500 milliGRAM(s) Oral two times a day  zonisamide 300 milliGRAM(s) Oral at bedtime    MEDICATIONS  (PRN):  acetaminophen     Tablet .. 650 milliGRAM(s) Oral every 6 hours PRN Temp greater or equal to 38C (100.4F)        T(F): 96.9 (01-03-22 @ 05:16), Max: 103.3 (01-02-22 @ 14:09)  HR: 96 (01-03-22 @ 05:16) (56 - 115)  BP: 130/75 (01-03-22 @ 05:16) (113/58 - 130/75)  RR: 18 (01-03-22 @ 05:16) (16 - 18)  SpO2: 100% (01-02-22 @ 20:47) (96% - 100%)        Neurologic Examination:  General:  Appearance is consistent with chronologic age.  No abnormal facies.   General: The patient is oriented to person, place, time and date.  Recent and remote memory intact.  Follows 4-step directions. Fund of knowledge is intact and normal.  Language with normal repetition, comprehension and naming.  Nondysarthric.    Cranial nerves: EOMI w/o nystagmus, skew or reported double vision.  PERRL.  No ptosis/weakness of eyelid closure.  Hearing grossly intact b/l.    Motor examination:   Normal tone, bulk and range of motion.  No tenderness, twitching, tremors or involuntary movements.  Formal Muscle Strength Testing: No observable drift.  Cerebellum:  Gait deferred         Labs:                         13.6   6.50  )-----------( 188      ( 03 Jan 2022 09:10 )             42.0     01-02    135  |  102  |  14  ----------------------------<  93  4.1   |  18  |  1.1    Ca    8.6      02 Jan 2022 15:00  Mg     1.7     01-02    TPro  7.1  /  Alb  4.1  /  TBili  0.3  /  DBili  x   /  AST  10  /  ALT  24  /  AlkPhos  113  01-02    LIVER FUNCTIONS - ( 02 Jan 2022 15:00 )  Alb: 4.1 g/dL / Pro: 7.1 g/dL / ALK PHOS: 113 U/L / ALT: 24 U/L / AST: 10 U/L / GGT: x             SARS-CoV-2: Detected (01-02-22 @ 15:22)  COVID-19 PCR: Detected (01-02-22 @ 14:32)        Neuroimaging:  CT Head:   < from: CT Head No Cont (12.02.20 @ 13:11) >  IMPRESSION:    No acute intracranial pathology, stable since 11/11/2019.    < end of copied text >        MRI Brain:   < from: MR Brain-Seizure, Epilepsy No Cont (03.10.21 @ 17:44) >  Impression:   No evidence of mesial temporal sclerosis. Enlargement of the sella with flattening of the pituitary gland; findings represent an empty/partially empty sella. Otherwise normal MRI of the brain. No evidence of acute infarction.          VEEG 12/3 - 12/4/2020 - right hemispheric focal slowing mainly FT, small to moderate number of right FT sharps at times presented as brief few seconds periodic activity      Assessment:  This is a 31y Male with h/o right hemispheric partial epilepsy, admitted for breakthrough seizures in context of COVID infection. Patient reports having occasional breakthrough seizures if he misses medications or gets sick. He is followed by Dr. Gordon at [facility].        Discussed with Dr. Purcell        Plan:   - Patient is cleared from neurology standpoint  - Continue pre-admission AEDs  - Symptomatic management of fever/infection  - the importance of medication compliance and regular follow ups reinforced.    Plan discussed with patient in details, all questions answered.    Discussed with Dr. Johnson. Neurology/Epilepsy Consult:    CHARLINE CHAMBERS 31y Male  MRN-[id number]    Patient is a 31y old  Male who presents with a chief complaint of seizures        HPI: History obtained from patient. EMR and outpatient records reviewed.  Patient presented to ED with c/o fever, chills, and breakthrough seizures for 2 days PTA. He reports several episodes of lapses in memory and time (one of his typical seizure types). Patient reports being compliant with medications, may have spit out medications 2 days prior to arrival since felt significant throat irritation after eating spicy food. Patient was found to have COVID. No seizures reported since admission.  Patient reports h/o seizures since 2014, currently is being followed at [facility] by Dr. Gordon, last visit in December 2021. Patient reports having occasional breakthrough seizures if misses medications or being sick/stressed. He had few episodes in the Fall 2021 after running out of [employer].        PAST MEDICAL & SURGICAL HISTORY:  Seizure disorder  Left testicular torsion        Allergy:  Lamictal (Unknown)        Home Medications:  eslicarbazepine 600 mg oral tablet: 7 tab(s) orally once a day   eslicarbazepine 800 mg oral tablet: 7 tab(s) orally once a day   KlonoPIN 0.5 mg oral tablet: 1 tab(s) orally twice a day   levETIRAcetam 500 mg oral tablet: 1 tab(s) orally every 12 hours   zonisamide 100 mg oral capsule: 3 cap(s) orally once a day (at bedtime) (04 Dec 2020 12:29)        MEDICATIONS  (STANDING):  clonazePAM  Tablet 0.5 milliGRAM(s) Oral at bedtime  enoxaparin Injectable 40 milliGRAM(s) SubCutaneous two times a day  eslicarbazepine Oral Tab/Cap - Peds 1400 milliGRAM(s) Oral <User Schedule>  levETIRAcetam 500 milliGRAM(s) Oral two times a day  zonisamide 300 milliGRAM(s) Oral at bedtime    MEDICATIONS  (PRN):  acetaminophen     Tablet .. 650 milliGRAM(s) Oral every 6 hours PRN Temp greater or equal to 38C (100.4F)        T(F): 96.9 (01-03-22 @ 05:16), Max: 103.3 (01-02-22 @ 14:09)  HR: 96 (01-03-22 @ 05:16) (56 - 115)  BP: 130/75 (01-03-22 @ 05:16) (113/58 - 130/75)  RR: 18 (01-03-22 @ 05:16) (16 - 18)  SpO2: 100% (01-02-22 @ 20:47) (96% - 100%)        Neurologic Examination:  General:  Appearance is consistent with chronologic age.  No abnormal facies.   General: The patient is oriented to person, place, time and date.  Recent and remote memory intact.  Follows 4-step directions. Fund of knowledge is intact and normal.  Language with normal repetition, comprehension and naming.  Nondysarthric.    Cranial nerves: EOMI w/o nystagmus, skew or reported double vision.  PERRL.  No ptosis/weakness of eyelid closure.  Hearing grossly intact b/l.    Motor examination:   Normal tone, bulk and range of motion.  No tenderness, twitching, tremors or involuntary movements.  Formal Muscle Strength Testing: No observable drift.  Cerebellum:  Gait deferred         Labs:                         13.6   6.50  )-----------( 188      ( 03 Jan 2022 09:10 )             42.0     01-02    135  |  102  |  14  ----------------------------<  93  4.1   |  18  |  1.1    Ca    8.6      02 Jan 2022 15:00  Mg     1.7     01-02    TPro  7.1  /  Alb  4.1  /  TBili  0.3  /  DBili  x   /  AST  10  /  ALT  24  /  AlkPhos  113  01-02    LIVER FUNCTIONS - ( 02 Jan 2022 15:00 )  Alb: 4.1 g/dL / Pro: 7.1 g/dL / ALK PHOS: 113 U/L / ALT: 24 U/L / AST: 10 U/L / GGT: x             SARS-CoV-2: Detected (01-02-22 @ 15:22)  COVID-19 PCR: Detected (01-02-22 @ 14:32)        Neuroimaging:  CT Head:   < from: CT Head No Cont (12.02.20 @ 13:11) >  IMPRESSION:    No acute intracranial pathology, stable since 11/11/2019.    < end of copied text >        MRI Brain:   < from: MR Brain-Seizure, Epilepsy No Cont (03.10.21 @ 17:44) >  Impression:   No evidence of mesial temporal sclerosis. Enlargement of the sella with flattening of the pituitary gland; findings represent an empty/partially empty sella. Otherwise normal MRI of the brain. No evidence of acute infarction.          VEEG 12/3 - 12/4/2020 - right hemispheric focal slowing mainly FT, small to moderate number of right FT sharps at times presented as brief few seconds periodic activity      Assessment:  This is a 31y Male with h/o right hemispheric partial epilepsy, admitted for breakthrough seizures in context of COVID infection. Patient reports h/o having breakthrough seizures if he misses medications or gets sick. He is followed by Dr. Gordon at [facility].        Discussed with Dr. Purcell        Plan:   - Patient is cleared for discharge from neurology standpoint  - Continue pre-admission AEDs  - Symptomatic management of fever/infection  - the importance of medication compliance and regular follow ups reinforced.    Plan discussed with patient in details, all questions answered.    Discussed with Dr. Johnson.

## 2022-01-03 NOTE — DISCHARGE NOTE PROVIDER - NSDCHOSPICE_GEN_A_CORE
Immediate Brief Procedure Note    Patient: Dmitriy Freitas    Pre-op Diagnosis: PE, brain hemorrhage from brain mets, lung ca    Post-op Diagnosis: same    Procedure: IVC filter    Proceduralist: Ganesh Isaac MD    Assistants: Brandy    Anesthesia Staff: Anesthesia staff cannot be found from this context.    Anesthesia Type: sedation    Findings: none    Estimated Blood Loss: none    Complications: none    Specimens Removed: none   No

## 2022-01-03 NOTE — DISCHARGE NOTE PROVIDER - HOSPITAL COURSE
31 yr old male with hx of absence seizure presetned to ER for breakthrough seizures and fever. Patient states for past two day had multiple episodes of seizure at home. Patient has been compliant with his medication Pt follows neurology Anatoly Hilario at [facility]. Pt denies recent head injury, headache vision changes f/c/n/v/d, chest pain, shortness of breath, dysuria/hematuria, back pain, numbness/focal weakness. Thinks he bit the inside of his cheeks but denies urinary/fecal incontinence    At the time of admission, pt in no acute distress and is speaking in full sentences, requesting lunch, aware of the inpatient monitoring.  Will admit for monitoring with Neuro consult for VEEG if needed   COVID (+)       Patient was seen and evaluated at bedside and he had no new complaint. Was afebrile with cough, tolerating diet. cleared for discharge by neurologist.     ICU Vital Signs Last 24 Hrs  T(C): 36.1 (03 Jan 2022 05:16), Max: 39.6 (02 Jan 2022 14:09)  T(F): 96.9 (03 Jan 2022 05:16), Max: 103.3 (02 Jan 2022 14:09)  HR: 96 (03 Jan 2022 05:16) (56 - 115)  BP: 130/75 (03 Jan 2022 05:16) (113/58 - 130/75)  RR: 18 (03 Jan 2022 05:16) (16 - 18)  SpO2: 100% (02 Jan 2022 20:47) (96% - 100%)    General: WN/WD NAD  Neurology: A&Ox3, nonfocal, BRIGGS x 4  Eyes: PERRLA/ EOMI, Gross vision intact  ENT/Neck: Neck supple, trachea midline, No JVD, Gross hearing intact  Respiratory: CTA B/L, No wheezing, rales, rhonchi  CV: RRR, S1S2, no murmurs, rubs or gallops  Abdominal: Soft, NT, ND +BS,   Extremities: No edema, + peripheral pulses  Skin: No Rashes, Hematoma, Ecchymosis

## 2022-01-03 NOTE — DISCHARGE NOTE PROVIDER - NSDCMRMEDTOKEN_GEN_ALL_CORE_FT
eslicarbazepine 200 mg oral tablet: 7 tab(s) orally once a day (at bedtime)  KlonoPIN 0.5 mg oral tablet: 1 tab(s) orally once a day (at bedtime)  levETIRAcetam 500 mg oral tablet: 1 tab(s) orally every 12 hours  zonisamide 100 mg oral capsule: 3 cap(s) orally once a day (at bedtime)

## 2022-01-03 NOTE — DISCHARGE NOTE PROVIDER - NSDCCPCAREPLAN_GEN_ALL_CORE_FT
PRINCIPAL DISCHARGE DIAGNOSIS  Diagnosis: Frequent seizures  Assessment and Plan of Treatment: Breakthrough - seen by neuro team and medications reviewed. Recommend to continue with home medications      SECONDARY DISCHARGE DIAGNOSES  Diagnosis: COVID-19  Assessment and Plan of Treatment: Take tylenol prn for fever. alternate with motrin 200mg po Q 8 hrs as needed. Drink fluid and take OTC Mucinex cough syrup.

## 2022-01-04 LAB
CRP SERPL-MCNC: 37 MG/L — HIGH
FERRITIN SERPL-MCNC: 92 NG/ML — SIGNIFICANT CHANGE UP (ref 30–400)
PROCALCITONIN SERPL-MCNC: 0.08 NG/ML — SIGNIFICANT CHANGE UP (ref 0.02–0.1)

## 2022-01-05 LAB
LEVETIRACETAM SERPL-MCNC: 6.9 UG/ML — LOW (ref 10–40)
ZONISAMIDE SERPL-MCNC: 17.3 UG/ML — SIGNIFICANT CHANGE UP (ref 10–40)

## 2022-01-08 DIAGNOSIS — E66.9 OBESITY, UNSPECIFIED: ICD-10-CM

## 2022-01-08 DIAGNOSIS — E83.42 HYPOMAGNESEMIA: ICD-10-CM

## 2022-01-08 DIAGNOSIS — G40.909 EPILEPSY, UNSPECIFIED, NOT INTRACTABLE, WITHOUT STATUS EPILEPTICUS: ICD-10-CM

## 2022-01-08 DIAGNOSIS — R56.9 UNSPECIFIED CONVULSIONS: ICD-10-CM

## 2022-01-08 DIAGNOSIS — U07.1 COVID-19: ICD-10-CM

## 2022-01-08 LAB
CULTURE RESULTS: SIGNIFICANT CHANGE UP
CULTURE RESULTS: SIGNIFICANT CHANGE UP
SPECIMEN SOURCE: SIGNIFICANT CHANGE UP
SPECIMEN SOURCE: SIGNIFICANT CHANGE UP

## 2022-07-11 NOTE — ED PROVIDER NOTE - CHILD ABUSE FACILITY
[facility]: An incision following the standard Mohs approach was done and the specimen was harvested as a microscopic controlled layer.

## 2022-08-26 ENCOUNTER — APPOINTMENT (OUTPATIENT)
Dept: NEUROLOGY | Facility: CLINIC | Age: 32
End: 2022-08-26

## 2022-08-26 VITALS
DIASTOLIC BLOOD PRESSURE: 80 MMHG | TEMPERATURE: 98 F | BODY MASS INDEX: 37.04 KG/M2 | HEIGHT: 68 IN | HEART RATE: 96 BPM | SYSTOLIC BLOOD PRESSURE: 125 MMHG | WEIGHT: 244.4 LBS | OXYGEN SATURATION: 96 %

## 2022-08-26 PROCEDURE — 99214 OFFICE O/P EST MOD 30 MIN: CPT

## 2022-08-29 LAB
ALBUMIN SERPL ELPH-MCNC: 4.8 G/DL
ALP BLD-CCNC: 131 U/L
ALT SERPL-CCNC: 17 U/L
ANION GAP SERPL CALC-SCNC: 13 MMOL/L
AST SERPL-CCNC: 10 U/L
BASOPHILS # BLD AUTO: 0.05 K/UL
BASOPHILS NFR BLD AUTO: 1 %
BILIRUB SERPL-MCNC: 0.2 MG/DL
BUN SERPL-MCNC: 14 MG/DL
CALCIUM SERPL-MCNC: 9.3 MG/DL
CHLORIDE SERPL-SCNC: 106 MMOL/L
CHOLEST SERPL-MCNC: 145 MG/DL
CO2 SERPL-SCNC: 21 MMOL/L
CREAT SERPL-MCNC: 1.07 MG/DL
EGFR: 95 ML/MIN/1.73M2
EOSINOPHIL # BLD AUTO: 0.16 K/UL
EOSINOPHIL NFR BLD AUTO: 3.3 %
ESTIMATED AVERAGE GLUCOSE: 108 MG/DL
GLUCOSE SERPL-MCNC: 89 MG/DL
HBA1C MFR BLD HPLC: 5.4 %
HCT VFR BLD CALC: 46.5 %
HDLC SERPL-MCNC: 36 MG/DL
HGB BLD-MCNC: 15.6 G/DL
IMM GRANULOCYTES NFR BLD AUTO: 0.8 %
LDLC SERPL CALC-MCNC: 94 MG/DL
LYMPHOCYTES # BLD AUTO: 1.15 K/UL
LYMPHOCYTES NFR BLD AUTO: 23.4 %
MAN DIFF?: NORMAL
MCHC RBC-ENTMCNC: 29.4 PG
MCHC RBC-ENTMCNC: 33.5 GM/DL
MCV RBC AUTO: 87.7 FL
MONOCYTES # BLD AUTO: 0.74 K/UL
MONOCYTES NFR BLD AUTO: 15.1 %
NEUTROPHILS # BLD AUTO: 2.77 K/UL
NEUTROPHILS NFR BLD AUTO: 56.4 %
NONHDLC SERPL-MCNC: 109 MG/DL
PLATELET # BLD AUTO: 246 K/UL
POTASSIUM SERPL-SCNC: 4.8 MMOL/L
PROT SERPL-MCNC: 7.4 G/DL
RBC # BLD: 5.3 M/UL
RBC # FLD: 14.3 %
SODIUM SERPL-SCNC: 140 MMOL/L
TRIGL SERPL-MCNC: 72 MG/DL
TSH SERPL-ACNC: 1.59 UIU/ML
WBC # FLD AUTO: 4.91 K/UL

## 2022-08-31 LAB
LEVETIRACETAM SERPL-MCNC: 13.3 UG/ML
OXCARBAZEPINE SERPL-MCNC: 27 UG/ML
ZONISAMIDE SERPL-MCNC: 20 UG/ML

## 2022-09-27 NOTE — HISTORY OF PRESENT ILLNESS
[FreeTextEntry1] : \belgica Lenz is a 30 yo, right handed, male returning for follow-up.  Initially referred by Dr. Michael Moura at [facility] neurological care.\par \par Seizures have not been the same - but still occurring.  They are not GTCs, no tongue bites, urinary control issues or muscle pain.  Reports that he may have had a mild seizure with brief blanking out, 20 seconds duration, and tend to occur with missed medications - particularly the clonazepam and keppra in the AM.  Has missed the aptiom and 2 days later a seizure occurred.  No headaches following.  Tends to make an error every other month.\par \par Able to live fairly normal - had some drinks with some friends and did not have a seizure.\par \par Looking to visit his aunt in [place] and will need a letter that he will require 2-3 months of medications.\par \par ZNS is at 300mg, aptiom is at 1400mg/d\par LEV 500mg bid\par \par Now going vegan.\par He stopped smoking cigarettes and MJ, cut down alcohol and feels limiting these triggers have helped.\par \par First seizure mid December 2014, the day before that seizure he had been out drinking. His first seizure was described as a 'grandmal'. He states his last large seizure has been October. \par todd Has sensitivity to light. \par \par Aura description:\par Deep anxiety that starts from the head and travels to the pit of the stomach and his hands become sweaty and the next he knows he blacks out.\par Feels that he could provoke an aura.

## 2022-09-27 NOTE — PHYSICAL EXAM
[FreeTextEntry1] : General:\par Constitutional:  Sitting comfortably in NAD.\par Psychiatric: well-groomed, appropriate affect\par Ears, Nose, Throat: no abnormalities, mucus membranes moist\par Neck: supple\par Extremities: no edema, clubbing or cyanosis\par Skin: no rash or neuro-cutaneous signs \par \par Cognitive:\par Orientation, language, memory and knowledge screens intact.\par \par Cranial Nerves:\par II: MIRACLE. III/IV/VI: EOM Full.  VII: Face appears symmetric VIII: Normal to screening\par IX/X: normal phonation  XI: Trapezius Symmetric  XII: Tongue midline\par Motor:\par Power: no pronator drift\par \par Narrow based gait\par

## 2022-09-27 NOTE — DISCUSSION/SUMMARY
[FreeTextEntry1] : Impression:\par 1) improved but with rare time-loss and frozen spells tending to occur with medication errors.  Refractory epilepsy, on multiple medications currently.  Not enough time to clean up regimen prior to him leaving.  .  Zonisamide was the most recent, now on 300mg/d. Keppra 500 mg BID (room to increase, has not been higher - no intolerance so far) and  Aptiom 1400 mg at bedtime continues, but in addition to clonazepam 0.5mg bid.\par 2) missed clonazepam doses - led to odd focal seizure, and EEG showed frequent rt frontal spiking at [facility].\par \par Plan:\par 1) trial of clobazam to replace clonazepam?\par 2) will need 3 months supply for overseas travel coming up.\par 3) midazolam nasal sprayer.\par 4) levels today

## 2022-10-05 NOTE — ED ADULT NURSE NOTE - MODE OF DISCHARGE
Patient: Jaylyn Tesfaye Date: 10/5/2022   : 1962    60 year old female      HEPATOLOGY FOLLOW UP:    Chief Complaint   Patient presents with   • Cirrhosis       HPI: Jaylyn Tesfaye is a 60 year old with a history of HCV Cirrhosis (genotype 1a) who presents to clinic today for follow up.     Patient was dx with cirrhosis via liver biopsy (2011). Patient's cirrhosis is complicated by portal HTN, esophageal/gastric varices S/P banding, volume overload, and ascites requiring paracentesis (2011). Her cirrhosis is currently well compensated with MELD-Na score 7-8. Patient completed HCV treatment with Harvoni x 12 weeks in 2016 and SVR24 was achieved. PMH also significant for hypertension, seasonal allergies, anxiety and history of substance abuse on methadone.     Patient is doing good. Offers no concerns. She did mention during the summer she had some ankle swelling but this got better. No volume overload issues now. Compliant with medications. Her weight is down to 270lbs. She does reports she has noticed she bruises easily. No alcohol use. Patient also denies F/C, jaundice, NV, abdominal pain, SOB, cough, CP, or urinary complaints.     PAST MEDICAL HISTORY:    Abdominal distention                            10/19/2011    Arthritis                                                     Psoriasis                                       1973      Comment: under control now    Degenerative disk disease                       2006        Comment: neck and back pain    Eczema                                                        Staph infection                                                 Comment: x 3    Varices, esophageal (CMS/HCC)                                 Substance abuse (CMS/HCC)                                       Comment: Hx Heroin use; in treatment and support group                last used May;2016    Diverticulosis of colon                         2011    Chronic pain                                     2016            Comment: neck; right shoulder; lower back; has had                trigger point inj. in past    H/O ETOH abuse                                                  Comment: \"not in years: per pt.\"  MD note/2/2017    Environmental allergies                                       Wears glasses                                                 PONV (postoperative nausea and vomiting)                      Fall                                            05/2017       Liver disorder                                                  Comment: cirrhosis related to infections    Hepatitis C                                                     Comment: TREATED    Sinusitis, chronic                                            Bronchitis                                                    Fracture                                                        Comment: nose    Lymphedema                                      Over 12 y*      Comment: In L leg    Social History:  Social History     Tobacco Use   • Smoking status: Current Every Day Smoker     Packs/day: 0.10     Years: 30.00     Pack years: 3.00     Types: Cigarettes   • Smokeless tobacco: Never Used   • Tobacco comment: 5 cigarettes/day   Substance Use Topics   • Alcohol use: Not Currently     Comment: last drink approximately 2011       MEDICATIONS:  Current Outpatient Medications   Medication Sig Dispense Refill   • spironolactone (ALDACTONE) 100 MG tablet TAKE ONE TABLET BY MOUTH DAILY 30 tablet 11   • furosemide (LASIX) 40 MG tablet TAKE ONE TABLET BY MOUTH DAILY 30 tablet 11   • propRANolol (INDERAL) 10 MG tablet TAKE ONE TABLET BY MOUTH TWICE A DAY 60 tablet 3   • methaDONE (DOLOPHINE) 5 MG/5ML solution      • Multiple Vitamins-Minerals (CENTRUM SILVER ADULT 50+) Tab Take by mouth daily.     • oxymetazoline (AFRIN) 0.05 % nasal spray Spray 2 sprays in each nostril 2 times daily. (Patient taking differently: Spray 2 sprays in each nostril  2 times daily as needed.) 30 mL 0     No current facility-administered medications for this visit.       ALLERGIES:  Allergies as of 10/05/2022 - Reviewed 10/05/2022   Allergen Reaction Noted   • Avocado   (food or med) HIVES 11/15/2011   • Grass SHORTNESS OF BREATH 11/14/2011   • Mold   (environmental) SHORTNESS OF BREATH 11/14/2011   • Trees SHORTNESS OF BREATH 11/14/2011   • Weeds SHORTNESS OF BREATH 11/14/2011   • Cat dander HIVES and SWELLING 11/14/2011   • Dog dander HIVES and SWELLING 11/14/2011   • Dust SWELLING and PRURITUS 11/14/2011       REVIEW OF SYSTEMS:  Negative except for HPI.    PHYSICAL EXAMINATION:  Visit Vitals  /70 (BP Location: LUE - Left upper extremity, Patient Position: Sitting, Cuff Size: Regular)   Pulse 74   Ht 5' 4\" (1.626 m)   Wt 122.5 kg (270 lb)   SpO2 95%   BMI 46.35 kg/m²     GENERAL: This is a 60 year old female in no apparent distress. Well-developed, well-nourished. A&O x3.   HEENT: Normocephalic, atraumatic. Sclerae anicteric. PERRLA.   NECK: Supple with no cervical or supraclavicular lymphadenopathy. No thyroid enlargement.   SKIN: Warm and dry without rashes or jaundice.  HEART: S1, S2 normal with no murmurs.  LUNGS: Good respiratory effort. CTAB without wheezes or rales.   ABDOMEN: Soft, nondistended, nontender, obese. No hepatosplenomegaly. No masses. Bowel sounds normal. No ascites.   MUSCULOSKELETAL:  no LE edema, cyanosis or clubbing.   NEUROLOGIC: Alert, awake and oriented x3 without asterixis or focal neurological deficit.  PSYCHIATRIC: Mood and affect is appropriate.    Labs: Reviewed in Epic.  MELD-Na score: 7 at 10/5/2022  2:09 PM  MELD score: 7 at 10/5/2022  2:09 PM  Calculated from:  Serum Creatinine: 0.75 mg/dL (Using min of 1 mg/dL) at 10/5/2022  2:09 PM  Serum Sodium: 144 mmol/L (Using max of 137 mmol/L) at 10/5/2022  2:09 PM  Total Bilirubin: 0.5 mg/dL (Using min of 1 mg/dL) at 10/5/2022  2:09 PM  INR(ratio): 1.1 at 10/5/2022  2:09 PM  Age: 60  years    Radiology Findings:    4/6/22 US liver  IMPRESSION:   1.  Cirrhosis.  2.  No evidence of discrete hepatic mass. Ultrasound is not sensitive for  the detection of hepatic masses. Future screening/surveillance is  recommended with multiphasic liver CT or MRI.  3.  Cholecystectomy changes.  4.  Patent hepatic vasculature with normal directional flow.  5.  Splenomegaly consistent with portal hypertension.  6/29/20 EGD   Postoperative Diagnosis:  1. One column of small esophageal varices that flattened easily with insufflation with no gastric varices  2. Portal hypertensive gastropathy  3. Normal duodenum    ASSESSMENT AND PLAN:  This is a 60 year old female with a history of HCV cirrhosis who presents for follow up.     1. HCV Cirrhosis. MELD-Na 7              -- Dx with cirrhosis via liver biopsy (11/2011).   -- Well compensated currently. H/o decompensation in the form of ascites.   -- HCV GT 1A S/P Harvoni x 12 weeks, completed 12/2016. SVR24 achieved.   -- No alcohol use  -- Defer OLT evaluation d/t low MELD score.   2. Portal HTN/Volume overload. No h/o SBP              -- No evidence of volume overload on exam  -- Last LVP in 2011.  -- Reinforced 2 gram sodium diet.   -- Continue Lasix 40 mg daily and Aldactone 100 mg daily.   3. Portal HTN/Varices: no h/o GIB  -- Last EGD 6/2020 with one column of small esophageal varices, severe portal HTN gastropathy, no gastric varices.  -- Continue Inderal 10mg BID.  HR 74.  --Repeat EGD 2-3 yrs. She will call Dr. Zimmerman's office and schedule repeat for early next year.   4. HCC screening  -- 4/2022 US Liver without suspicious hepatic lesions.   -- 3/11/22 AFP 7  -- CT liver ordered. Continue routine HCC screening  5. Nutrition/ Obesity. BMI 46    -- Following with RD  6. Vitamin D deficiency- Vitamin D level 12.6. Patient on cholecalciferol now. Repeat level ordered.   7. Immunization against HAV/HBV: Immune to HAV and HBV.       Follow up appointment: 6 months with  labs prior    TOM Arnold  [facility]/Hepatology  Phone# [phone]  Available via Epic secure Rumgr [phone]          Ambulatory

## 2022-11-27 NOTE — ED PROVIDER NOTE - MEDICAL DECISION MAKING DETAILS
Pt A/O x4. Without notable acute distress. Verbalized understanding of discharge instructions and patient teaching. Ambulated out of ED without difficulty with all belongings. see attestation

## 2023-02-17 NOTE — ED ADULT NURSE NOTE - NSSISCREENINGQ2_ED_A_ED
[de-identified] : Ms. Eaton comes back to see us today, Kari is a patient that had conversion of VSG to MDS with Dr. Mcgill as well as an incisional hernia repair. Her major issue is chronic pain as well as loss of muscle strength, core strength and multiple vitamin deficiencies. She has had numerous CT scans that have been placed that have shown no evidence of obstruction or other issues other than dilated colon. Her vitamin levels were low, and she has pain with walking. She was recently at [facility] where they stated that she had distention of the gallbladder and sludge but her abdominal pain is greatest in the right groin aggravated by walking and shows no association with food. I believe the dilation of the colon is related to previous narcotic use which she states she has stopped as well as reaction to the food she is eating. From a bariatric standpoint, I think we should take her labs, see what they are and consider potentially TPN if truly abnormal or a feeding tube and see if better nutrition can heal some of her issues. Currently , i think we are in a vicious cycle where her pain inhibits eating potentially encourages uses of medication and this complicates her vitamin levels. Her balance is impaired and her strength is less than expected. Her last labs show no evidence of protein malnutrition, PTH was elevated. Will repeat and add copper, zinc, selenium, and fatty acids levels. Again, not sure of any procedure that will help. Previously discussed converting the DS to gastric bypass, but reflux does not seem to be major issue and EGD showed normal esophagus even with Demeester scale that was elevated.
No

## 2023-03-21 NOTE — PATIENT PROFILE ADULT - LIVES WITH
[Dear  ___] : Dear  [unfilled], [I had the pleasure of evaluating your patient, [unfilled] for ___] : I had the pleasure of evaluating your patient, [unfilled] for [unfilled]. parent(s)

## 2023-05-15 RX ORDER — LEVETIRACETAM 500 MG/1
500 TABLET, FILM COATED ORAL
Qty: 120 | Refills: 0 | Status: COMPLETED | COMMUNITY
Start: 2020-07-13 | End: 2023-05-15

## 2023-05-15 RX ORDER — ZONISAMIDE 100 MG/1
100 CAPSULE ORAL
Qty: 180 | Refills: 0 | Status: COMPLETED | COMMUNITY
Start: 2020-08-20 | End: 2023-05-15

## 2023-05-15 RX ORDER — CLONAZEPAM 0.5 MG/1
0.5 TABLET ORAL
Qty: 90 | Refills: 0 | Status: COMPLETED | COMMUNITY
Start: 2020-10-14 | End: 2023-05-15

## 2023-05-15 RX ORDER — ESLICARBAZEPINE ACETATE 600 MG/1
600 TABLET ORAL
Qty: 90 | Refills: 0 | Status: COMPLETED | COMMUNITY
End: 2023-05-15

## 2023-05-15 RX ORDER — ESLICARBAZEPINE ACETATE 800 MG/1
800 TABLET ORAL
Qty: 90 | Refills: 1 | Status: COMPLETED | COMMUNITY
Start: 2020-12-17 | End: 2023-05-15

## 2023-10-10 NOTE — PATIENT PROFILE ADULT - NSPROMEDSBROUGHTTOHOSP_GEN_A_NUR
Abdomen: Soft, nontender, none distended, no guarding or rigidity, no masses palpable, normal bowel sounds, no hepatosplenomegaly. no

## 2023-10-13 NOTE — DISCHARGE NOTE PROVIDER - NSDCDCMDCOMP_GEN_ALL_CORE
[facility]/Hospital: [id number] [facility], [address]    Psychiatric Progress Note  MRN#: [id number]  Greg Roque 46 y.o. male    This note was not shared with the patient due to reasonable likelihood of causing patient harm   ___________________________________________________________________________________________________________________________________  OFFICE APPOINTMENT   Seen today at [address] [facility] location                                                Patient Greg Roque ,1971   Prescriber/Physician: Concepción Plascencia DO Physician Location:   [address] [facility] [address]       This service was provided in the office. Patient is currently located in the [place], where I am  licensed. Patient gave consent to proceed with encounter; acknowledge understanding of security and privacy of encounter   [facility]   Patient verbalized understanding evaluation only involves Psychiatric diagnosing, prescribing, result monitoring   Patient was informed this is a billable service and legal   ___________________________________________________________________________________________________________________________________     Encounter: 10/19/2023  Chief Complaint   Patient presents with   • Anxiety       Subjective: Lexapro was started , Arlon Flies there's less negative thinking otherwise  anxiety is the same- tiredness and  jitteriness - relieved with PRN Ativan. He stated some acute stress w/ job search. He  had decent interview recently, in the mist of searching for work x few months. In general without negative thoughts .          ROS:  SI/HI : denies  Depression:  denies  Sleep:  duration , 6-7 hrs   Concentration- the same , struggles   Luli:  pretty stable , no extremities        Medications: He's compliant to Lexapro, Lorazepam PRN , Zolpidem, Bupropion, and Lamictal,   Med s/e:  denies    Medical ROS:   Constitutional: negative for chills and fevers  Respiratory: negative for SOB, wheezing   Cardiovascular: negative for chest pain, palpitations   Neurological: negative for dizziness and numbness/tingling   Pertinent items are noted above or in HPI, all other symptoms are negative         Substance Hx:  Tobacco-   never  Illiicit- last used cannabis about 20 yrs ago   Alcohol- couple times per month 2-3 beers , he previously drink 4-5 . Denies h/o blackout seizure, DUI    Mental Status Evaluation:  General Appearance:  Pancho Wolf is a 46 y.o.  male age appropriate, casually dressed,  looks stated age   Behavior:  + anxious , appropriate eye contact   Speech:  normal for rate, rhythm, volume, latency, amount   Mood:  anxious    Affect:  anxious   Thought Process:  normal and logical   Thought Content:  no overt delusions, normal   Perceptual Disturbances: no auditory hallucinations, no visual hallucinations, Does not appear preoccupied or responding  Delusions  w/o   Risk Potential: Suicidal Ideations  w/o  Homicidal Ideations w/o  Potential for Aggression Yes     Sensorium:  Oriented to person, place ( [facility] in [facility]), time/date (2023) and situation   Memory:  recent and remote memory grossly intact   Consciousness:  alert and awake   Attention: attention span and concentration are age appropriate   Insight:  appropriate   Judgment: appropriate   Gait/Station: normal   Motor Activity: no abnormal movements     There were no vitals filed for this visit.      Medications: Verified during current encounter   Current Outpatient Medications on File Prior to Visit   Medication Sig Dispense Refill   • buPROPion (WELLBUTRIN XL) 300 mg 24 hr tablet Bupropion ER 300m tablet po daily in the morning 90 tablet 0   • chlorhexidine (PERIDEX) 0.12 % solution RINSE WITH 1/2 OUNCE TWICE A DAY AFTER BREAKFAST AND BEDTIME. SPIT DO NOT SWALLOW     • escitalopram (Lexapro) 10 mg tablet Escitalopram 10m/2 tablet po daily x 1 wk. Then increased 1 tablet po daily 30 tablet 1   • lamoTRIgine (LaMICtal) 200 MG tablet Lamotrigine 200m/2 tablet twice daily 90 tablet 1   • LORazepam (ATIVAN) 0.5 mg tablet Lorazepam 0.5m tablet po daily PRN 30 tablet 2   • zolpidem (AMBIEN CR) 12.5 MG CR tablet Zolpidem Tartrate ER 12.5m tablet po daily at night 30 tablet 2     No current facility-administered medications on file prior to visit. Labs: I have personally reviewed all pertinent laboratory/tests results. Most Recent Labs:   Lab Results   Component Value Date    SODIUM 140 04/15/2023    K 4.3 04/15/2023     04/15/2023    CO2 26 04/15/2023    BUN 15 04/15/2023    CREATININE 1.15 04/15/2023    GLUC 84 04/15/2023    CALCIUM 9.3 04/15/2023    AST 26 04/15/2023    ALT 31 04/15/2023    ALKPHOS 85 04/15/2023    TP 6.3 04/15/2023    ALB 4.7 04/15/2023    TBILI 1.0 04/15/2023    CHOLESTEROL 167 04/15/2023    HDL 38 (L) 04/15/2023    TRIG 98 04/15/2023    LDLCALC 110 (H) 04/15/2023     ________________________________________________________________________    A/P  Kai Dobson a 46 y.o.  male, h/o cannabis abuse and alcoholism, now sober > 10yrs, h/o bipolar disorder, anxiety , who presented with labile mood in the setting situational stressor. There's findings of  anticipatory anxiety, worries , slight improvement with sleep , since starting Lexapro 10mg ~ 3 wks. Devoid of SI , plan or intent       Rating Forms Administered  PHQ9= 11 ( moderate)  MIGUEL= 11 ( moderate)    DSM5  Generalized anxiety disorder  Bipolar I disorder in remission       PLAN:    History, external records was reviewed.  Discussed clinical findings ,diagnotic impression: anxiety   Did not change other medications  PDMP reviewed- w/o discrepancies 2023- has 2 refills       Medications List :  - escitalopram (Lexapro) 10 mg tablet; Escitalopram 10m/2 tablet po daily x 1 wk. Then increased 1 tablet po daily  -     LORazepam (ATIVAN) 0.5 mg tablet; Lorazepam 0.5m tablet po daily PRN  -     zolpidem (AMBIEN CR) 12.5 MG CR tablet; Zolpidem Tartrate ER 12.5m tablet po daily at night  -     buPROPion (WELLBUTRIN XL) 300 mg 24 hr tablet; Bupropion ER 300m tablet po daily in the morning  -     lamoTRIgine (LaMICtal) 200 MG tablet; Lamotrigine 200m/2 tablet twice daily      Treatement: Risks, benefits, and possible side effects of medications explained to patient and patient verbalizes understanding. Next Appt @ [facility]: 4-6 wk      Today's Appointment@ [facility]  Face To Face: 9:09- 9:21    Encounter Duration: Time Spent 12min with Patient. Greater than 50% of total time was spent with the patient     MDM  Number of Diagnoses or Management Options  Diagnosis management comments: 2       Amount and/or Complexity of Data Reviewed  Review and summarize past medical records: yes    Risk of Complications, Morbidity, and/or Mortality  Presenting problems: low  Diagnostic procedures: low  Management options: low         This note may have been written with the assistance of dictation software.  Please excuse any grammatical  errors, misspellings,  and abnormal spacing of letters , sentences or paragraphs This document is complete and the patient is ready for discharge.

## 2023-11-06 NOTE — ED ADULT TRIAGE NOTE - BP NONINVASIVE DIASTOLIC (MM HG)
Carolyne Esteban Keiser  Gynecologic Oncology  [address] [place] [id number]  Phone: [phone]  Fax: [phone]  Established Patient  Follow Up Time:    73

## 2023-11-13 ENCOUNTER — APPOINTMENT (OUTPATIENT)
Dept: NEUROLOGY | Facility: CLINIC | Age: 33
End: 2023-11-13

## 2024-03-15 ENCOUNTER — APPOINTMENT (OUTPATIENT)
Dept: NEUROLOGY | Facility: CLINIC | Age: 34
End: 2024-03-15
Payer: MEDICAID

## 2024-03-15 VITALS
SYSTOLIC BLOOD PRESSURE: 121 MMHG | HEART RATE: 86 BPM | OXYGEN SATURATION: 96 % | TEMPERATURE: 98.7 F | DIASTOLIC BLOOD PRESSURE: 80 MMHG

## 2024-03-15 PROCEDURE — 99214 OFFICE O/P EST MOD 30 MIN: CPT

## 2024-03-20 NOTE — DISCUSSION/SUMMARY
[FreeTextEntry1] : Impression: 1) Seizures - still experiencing time-loss and frozen spells around once per month. Refractory epilepsy, on multiple medications currently 2) missed clonazepam doses - led to odd focal seizure, and EEG showed frequent rt frontal spiking at [facility].  Plan: 1) Trial of Xcopri up-titration 2) AED levels today 3) Follow up with AMRIT Adame in 3 weeks

## 2024-03-20 NOTE — END OF VISIT
[FreeTextEntry3] : I, Marie Geller, attest that this documentation has been prepared under the direction in and the presence of provider Anatoly Gordon MD.

## 2024-03-20 NOTE — HISTORY OF PRESENT ILLNESS
[FreeTextEntry1] : 3/15/24 HPI: Delfin is a 33 year old male presenting for a follow up visit for seizures.  Seizure frequency is about the same over the past year and a half. Can have 3 months without anything, but usually experiences them around once per month. Smaller than in the past, always present with aura and lead to loss of awareness. Unsure exactly how long he is out. Believes dehydration and cigarette smoking may be triggers.   Traveled to [facility] end of 2022, experienced one seizure on a night out, but was no different than other nights - and he had gone out with drinks many nights there. Believes he is reliably taking meds on time, does not miss doses.   Meds: Zonisamide 300mg Aptiom 1400mg Keppra 500mg BID Clonazepam 0.5mg BID ------------------------ Last seen 8/26/22: Seizures have not been the same - but still occurring. They are not GTCs, no tongue bites, urinary control issues or muscle pain. Reports that he may have had a mild seizure with brief blanking out, 20 seconds duration, and tend to occur with missed medications - particularly the clonazepam and keppra in the AM. Has missed the aptiom and 2 days later a seizure occurred. No headaches following. Tends to make an error every other month.  Able to live fairly normal - had some drinks with some friends and did not have a seizure.  Looking to visit his aunt in [facility] and will need a letter that he will require 2-3 months of medications.  ZNS is at 300mg, aptiom is at 1400mg/d LEV 500mg bid  Now going vegan. He stopped smoking cigarettes and MJ, cut down alcohol and feels limiting these triggers have helped.  First seizure mid December 2014, the day before that seizure he had been out drinking. His first seizure was described as a 'grandmal'. He states his last large seizure has been October.  Has sensitivity to light.  Aura description: Deep anxiety that starts from the head and travels to the pit of the stomach and his hands become sweaty and the next he knows he blacks out. Feels that he could provoke an aura.

## 2024-03-20 NOTE — PHYSICAL EXAM
[FreeTextEntry1] : General: Constitutional: Sitting comfortably in NAD. Psychiatric: well-groomed, appropriate affect Ears, Nose, Throat: no abnormalities, mucus membranes moist Neck: supple Extremities: no edema, clubbing or cyanosis Skin: no rash or neuro-cutaneous signs  Cognitive: Orientation, language, memory and knowledge screens intact.  Cranial Nerves: II: MIRACLE. III/IV/VI: EOM Full. VII: Face appears symmetric. VIII: Normal to screening. IX/X: Normal phonation. XI: Trapezius Symmetric. XII: Tongue midline.  Motor: Power: No pronator drift.  Normal gait.

## 2024-04-09 NOTE — REASON FOR VISIT
[Follow-Up: _____] : a [unfilled] follow-up visit [Home] : at home, [unfilled] , at the time of the visit. [Medical Office: ([facility])___] : at the medical office located in  [Patient] : the patient

## 2024-04-10 ENCOUNTER — APPOINTMENT (OUTPATIENT)
Dept: NEUROLOGY | Facility: CLINIC | Age: 34
End: 2024-04-10
Payer: MEDICAID

## 2024-04-10 DIAGNOSIS — G40.909 EPILEPSY, UNSPECIFIED, NOT INTRACTABLE, W/OUT STATUS EPILEPTICUS: ICD-10-CM

## 2024-04-10 PROCEDURE — 99214 OFFICE O/P EST MOD 30 MIN: CPT

## 2024-04-10 RX ORDER — LEVETIRACETAM 500 MG/1
500 TABLET, FILM COATED ORAL
Qty: 180 | Refills: 0 | Status: ACTIVE | COMMUNITY
Start: 2023-02-09 | End: 1900-01-01

## 2024-04-10 NOTE — DISCUSSION/SUMMARY
[FreeTextEntry1] : Impression: 1) Seizures - still experiencing time-loss and frozen spells around once per month. Refractory epilepsy, on multiple medications currently. Recently started Xcopri, tolerating meds well  2) missed clonazepam doses - led to odd focal seizure, and EEG showed frequent rt frontal spiking at [facility].  Plan: 1) Continue Xcopri up titration to 100mg daily 2) Follow up with Dr. Gordon in 2 months to discuss further AED changes

## 2024-04-10 NOTE — PHYSICAL EXAM
[FreeTextEntry1] : General: Constitutional: Sitting comfortably in NAD. Psychiatric: well-groomed, appropriate affect  Cognitive: Orientation, language, memory and knowledge screens intact.  Cranial Nerves: II: MIRACLE. III/IV/VI: EOM Full. VII: Face appears symmetric. VIII: Normal to screening. IX/X: Normal phonation. XI: Trapezius Symmetric. XII: Tongue midline.

## 2024-04-10 NOTE — HISTORY OF PRESENT ILLNESS
[FreeTextEntry1] : 4/10/24 HPI: Delfin is a 33 year old male presenting for a follow up visit for seizures.  Started Xcopri. Took longer to get at [facility] than expected, but started last week. Now on 12.5mg. Tolerating med well, denies any worsening fatigue, irritability, or dizziness. Unsure the date of his last seizure, but believes it was sometime at the beginning of 2024.   Meds: Xcopri 12.5mg  Zonisamide 300mg (level 22.7) Aptiom 1400mg (level 20) Keppra 500mg BID (level 12.5) ------------------------- Last seen 3/15/24: Seizure frequency is about the same over the past year and a half. Can have 3 months without anything, but usually experiences them around once per month. Smaller than in the past, always present with aura and lead to loss of awareness. Unsure exactly how long he is out. Believes dehydration and cigarette smoking may be triggers.  Traveled to [facility] end of 2022, experienced one seizure on a night out, but was no different than other nights - and he had gone out with drinks many nights there. Believes he is reliably taking meds on time, does not miss doses. ------------------------ Last seen 8/26/22: Seizures have not been the same - but still occurring. They are not GTCs, no tongue bites, urinary control issues or muscle pain. Reports that he may have had a mild seizure with brief blanking out, 20 seconds duration, and tend to occur with missed medications - particularly the clonazepam and keppra in the AM. Has missed the aptiom and 2 days later a seizure occurred. No headaches following. Tends to make an error every other month.  Able to live fairly normal - had some drinks with some friends and did not have a seizure.  Looking to visit his aunt in [facility] and will need a letter that he will require 2-3 months of medications.  ZNS is at 300mg, aptiom is at 1400mg/d LEV 500mg bid  Now going vegan. He stopped smoking cigarettes and MJ, cut down alcohol and feels limiting these triggers have helped.  First seizure mid December 2014, the day before that seizure he had been out drinking. His first seizure was described as a 'grandmal'. He states his last large seizure has been October.  Has sensitivity to light.  Aura description: Deep anxiety that starts from the head and travels to the pit of the stomach and his hands become sweaty and the next he knows he blacks out. Feels that he could provoke an aura.

## 2024-04-12 LAB
ALBUMIN SERPL ELPH-MCNC: 4.4 G/DL
ALP BLD-CCNC: 153 U/L
ALT SERPL-CCNC: 17 U/L
ANION GAP SERPL CALC-SCNC: 10 MMOL/L
AST SERPL-CCNC: 8 U/L
BILIRUB SERPL-MCNC: <0.2 MG/DL
BUN SERPL-MCNC: 15 MG/DL
CALCIUM SERPL-MCNC: 9.2 MG/DL
CHLORIDE SERPL-SCNC: 110 MMOL/L
CO2 SERPL-SCNC: 21 MMOL/L
CREAT SERPL-MCNC: 1.17 MG/DL
EGFR: 84 ML/MIN/1.73M2
ESTIMATED AVERAGE GLUCOSE: 114 MG/DL
FOLATE SERPL-MCNC: 7.7 NG/ML
GLUCOSE SERPL-MCNC: 115 MG/DL
HBA1C MFR BLD HPLC: 5.6 %
HCT VFR BLD CALC: 43.4 %
HGB BLD-MCNC: 13.6 G/DL
LEVETIRACETAM SERPL-MCNC: 12.5 UG/ML
MCHC RBC-ENTMCNC: 28.5 PG
MCHC RBC-ENTMCNC: 31.3 GM/DL
MCV RBC AUTO: 90.8 FL
OXCARBAZEPINE SERPL-MCNC: 20 UG/ML
PLATELET # BLD AUTO: 231 K/UL
POTASSIUM SERPL-SCNC: 5.1 MMOL/L
PROT SERPL-MCNC: 6.9 G/DL
RBC # BLD: 4.78 M/UL
RBC # FLD: 14.7 %
SODIUM SERPL-SCNC: 140 MMOL/L
VIT B12 SERPL-MCNC: 401 PG/ML
WBC # FLD AUTO: 8 K/UL
ZONISAMIDE SERPL-MCNC: 22.7 UG/ML

## 2024-04-26 ENCOUNTER — NON-APPOINTMENT (OUTPATIENT)
Age: 34
End: 2024-04-26

## 2024-05-10 RX ORDER — CENOBAMATE 50MG-100MG
14 X 50 MG & KIT ORAL
Qty: 1 | Refills: 0 | Status: ACTIVE | COMMUNITY
Start: 2024-03-15 | End: 1900-01-01

## 2024-05-24 RX ORDER — ZONISAMIDE 100 MG/1
100 CAPSULE ORAL
Qty: 270 | Refills: 1 | Status: ACTIVE | COMMUNITY
Start: 2023-02-09 | End: 1900-01-01

## 2024-05-28 RX ORDER — CLONAZEPAM 0.5 MG/1
0.5 TABLET ORAL
Qty: 90 | Refills: 0 | Status: ACTIVE | COMMUNITY
Start: 2023-02-09 | End: 1900-01-01

## 2024-05-30 RX ORDER — ESLICARBAZEPINE ACETATE 600 MG/1
600 TABLET ORAL
Qty: 90 | Refills: 1 | Status: ACTIVE | COMMUNITY
Start: 2023-02-09

## 2024-05-30 RX ORDER — ESLICARBAZEPINE ACETATE 800 MG/1
800 TABLET ORAL
Qty: 90 | Refills: 1 | Status: ACTIVE | COMMUNITY
Start: 2023-02-09

## 2024-06-03 ENCOUNTER — APPOINTMENT (OUTPATIENT)
Dept: NEUROLOGY | Facility: CLINIC | Age: 34
End: 2024-06-03
Payer: MEDICAID

## 2024-06-03 DIAGNOSIS — G40.209 LOCALIZATION-RELATED (FOCAL) (PARTIAL) SYMPTOMATIC EPILEPSY AND EPILEPTIC SYNDROMES WITH COMPLEX PARTIAL SEIZURES, NOT INTRACTABLE, W/OUT STATUS EPILEPTICUS: ICD-10-CM

## 2024-06-03 PROCEDURE — 99214 OFFICE O/P EST MOD 30 MIN: CPT

## 2024-06-03 RX ORDER — OXCARBAZEPINE 300 MG/1
300 TABLET, FILM COATED ORAL
Qty: 90 | Refills: 0 | Status: ACTIVE | COMMUNITY
Start: 2024-06-03 | End: 1900-01-01

## 2024-06-03 RX ORDER — CENOBAMATE 100 MG/1
100 TABLET, FILM COATED ORAL
Qty: 30 | Refills: 1 | Status: ACTIVE | COMMUNITY
Start: 2024-06-03 | End: 1900-01-01

## 2024-06-03 NOTE — DISCUSSION/SUMMARY
[FreeTextEntry1] : Impression: 1) improved but with rare time-loss and frozen spells tending to occur with medication errors.  Refractory epilepsy, on multiple medications currently.  Zonisamide was the most recent, now on 300mg/d. Keppra 500 mg BID (room to increase, has not been higher - no intolerance so far) and  Aptiom 1400 mg at bedtime continues, in addition to clonazepam 0.5mg bid. 2) missed aptiom doses awaiting pre-auth with seizure cluster 3) missed clonazepam doses - led to odd focal seizure, and EEG showed frequent rt frontal spiking at [facility].  Plan: 1) trial of clobazam to replace clonazepam? 2) trial of oxcarb as back-up, 300mg bid 3) midazolam nasal sprayer PRN prolonged seizures. 4) EMU to complete a cross-over xcopri for LEV.

## 2024-06-03 NOTE — HISTORY OF PRESENT ILLNESS
[FreeTextEntry1] :  Delfin is a 33 year old male presenting for a follow up visit for seizures.  Recently had a problem with his insurance company - a problem with Aptiom and not being pre-authorized. He missed 7 days of aptiom, and during that time he had a few seizures, even 2 within a 24 hour period.   Passing out seizures and seizures with him being coherent but struggling with seizures causing speech arrest and tensed up muscles in the right hand, only able to only use the left hand and had fallen with them but luckily did not his his head.  Went to the ED. Last seizure was Saturday at 6am.  Prior to this, was having fluctuating frequency, up to 3 months without anything, but usually once per month. Smaller than in the past, always present with aura and lead to loss of awareness. Unsure exactly how long he is out. Believes dehydration and cigarette smoking may be triggers.   Traveled to [facility] end of 2022, experienced one seizure on a night out, but was no different than other nights - and he had gone out with drinks many nights there. Believes he is reliably taking meds on time, does not miss doses.   Meds: Zonisamide 300mg Aptiom 1400mg Keppra 500mg BID Clonazepam 0.5mg BID ------------------------ Last seen 8/26/22: Seizures have not been the same - but still occurring. They are not GTCs, no tongue bites, urinary control issues or muscle pain. Reports that he may have had a mild seizure with brief blanking out, 20 seconds duration, and tend to occur with missed medications - particularly the clonazepam and keppra in the AM. Has missed the aptiom and 2 days later a seizure occurred. No headaches following. Tends to make an error every other month. Able to live fairly normal - had some drinks with some friends and did not have a seizure.  Now going vegan. He stopped smoking cigarettes and MJ, cut down alcohol and feels limiting these triggers have helped.  First seizure mid December 2014, the day before that seizure he had been out drinking. His first seizure was described as a 'grandmal'. He states his last large seizure has been October.  Aura description: Deep anxiety that starts from the head and travels to the pit of the stomach and his hands become sweaty and the next he knows he blacks out.  Feels that he could provoke an aura.

## 2024-06-03 NOTE — PHYSICAL EXAM
[FreeTextEntry1] : General: Constitutional:  Sitting comfortably in NAD. Psychiatric: well-groomed, appropriate affect Ears, Nose, Throat: no abnormalities, mucus membranes moist Neck: supple Extremities: no edema, clubbing or cyanosis Skin: no rash or neuro-cutaneous signs   Cognitive: Orientation, language, memory and knowledge screens intact.  Cranial Nerves: II: MIRACLE. III/IV/VI: EOM Full.  VII: Face appears symmetric VIII: Normal to screening IX/X: normal phonation  XI: Trapezius Symmetric  XII: Tongue midline Motor: Power: no pronator drift  Narrow based gait

## 2024-12-17 ENCOUNTER — APPOINTMENT (OUTPATIENT)
Dept: NEUROLOGY | Facility: CLINIC | Age: 34
End: 2024-12-17

## 2025-01-14 ENCOUNTER — APPOINTMENT (OUTPATIENT)
Dept: NEUROLOGY | Facility: CLINIC | Age: 35
End: 2025-01-14

## 2025-02-26 ENCOUNTER — NON-APPOINTMENT (OUTPATIENT)
Age: 35
End: 2025-02-26

## 2025-03-17 ENCOUNTER — APPOINTMENT (OUTPATIENT)
Dept: NEUROLOGY | Facility: CLINIC | Age: 35
End: 2025-03-17
Payer: COMMERCIAL

## 2025-03-17 DIAGNOSIS — G40.209 LOCALIZATION-RELATED (FOCAL) (PARTIAL) SYMPTOMATIC EPILEPSY AND EPILEPTIC SYNDROMES WITH COMPLEX PARTIAL SEIZURES, NOT INTRACTABLE, W/OUT STATUS EPILEPTICUS: ICD-10-CM

## 2025-03-17 PROCEDURE — 99214 OFFICE O/P EST MOD 30 MIN: CPT | Mod: 95

## 2025-07-14 ENCOUNTER — APPOINTMENT (OUTPATIENT)
Facility: CLINIC | Age: 35
End: 2025-07-14
Payer: MEDICAID

## 2025-07-14 PROBLEM — H93.11 TINNITUS OF RIGHT EAR: Status: ACTIVE | Noted: 2025-07-14

## 2025-07-14 PROCEDURE — G2211 COMPLEX E/M VISIT ADD ON: CPT | Mod: NC,95

## 2025-07-14 PROCEDURE — 99214 OFFICE O/P EST MOD 30 MIN: CPT | Mod: 95

## 2025-07-29 ENCOUNTER — APPOINTMENT (OUTPATIENT)
Dept: OTOLARYNGOLOGY | Facility: CLINIC | Age: 35
End: 2025-07-29
Payer: MEDICAID

## 2025-07-29 ENCOUNTER — NON-APPOINTMENT (OUTPATIENT)
Age: 35
End: 2025-07-29

## 2025-07-29 VITALS — HEIGHT: 68 IN

## 2025-07-29 DIAGNOSIS — H93.13 TINNITUS, BILATERAL: ICD-10-CM

## 2025-07-29 DIAGNOSIS — H61.23 IMPACTED CERUMEN, BILATERAL: ICD-10-CM

## 2025-07-29 PROCEDURE — 92550 TYMPANOMETRY & REFLEX THRESH: CPT | Mod: 52

## 2025-07-29 PROCEDURE — G0268 REMOVAL OF IMPACTED WAX MD: CPT

## 2025-07-29 PROCEDURE — 99204 OFFICE O/P NEW MOD 45 MIN: CPT | Mod: 25

## 2025-07-29 PROCEDURE — 92557 COMPREHENSIVE HEARING TEST: CPT

## 2025-07-30 ENCOUNTER — APPOINTMENT (OUTPATIENT)
Dept: MRI IMAGING | Facility: CLINIC | Age: 35
End: 2025-07-30
Payer: MEDICAID

## 2025-07-30 ENCOUNTER — OUTPATIENT (OUTPATIENT)
Dept: OUTPATIENT SERVICES | Facility: HOSPITAL | Age: 35
LOS: 1 days | End: 2025-07-30

## 2025-07-30 DIAGNOSIS — N44.00 TORSION OF TESTIS, UNSPECIFIED: Chronic | ICD-10-CM

## 2025-07-30 PROCEDURE — 70553 MRI BRAIN STEM W/O & W/DYE: CPT | Mod: 26

## 2025-08-18 ENCOUNTER — APPOINTMENT (OUTPATIENT)
Dept: OTOLARYNGOLOGY | Facility: CLINIC | Age: 35
End: 2025-08-18
Payer: MEDICAID

## 2025-08-18 DIAGNOSIS — H93.A1 PULSATILE TINNITUS, RIGHT EAR: ICD-10-CM

## 2025-08-18 PROCEDURE — 99212 OFFICE O/P EST SF 10 MIN: CPT | Mod: 93
